# Patient Record
Sex: MALE | Race: WHITE | NOT HISPANIC OR LATINO | Employment: OTHER | URBAN - METROPOLITAN AREA
[De-identification: names, ages, dates, MRNs, and addresses within clinical notes are randomized per-mention and may not be internally consistent; named-entity substitution may affect disease eponyms.]

---

## 2017-04-24 DIAGNOSIS — G47.00 INSOMNIA, UNSPECIFIED TYPE: ICD-10-CM

## 2017-04-24 RX ORDER — CLONAZEPAM 0.5 MG/1
TABLET ORAL
Qty: 30 TAB | Refills: 3 | Status: SHIPPED
Start: 2017-04-24 | End: 2017-08-21 | Stop reason: SDUPTHER

## 2017-04-24 NOTE — TELEPHONE ENCOUNTER
Have we ever prescribed this med? Yes.  If yes, what date? 08/22/2016    Last OV: 08/19/2016 - Suzette Ohara    Next OV: 06/09/2017 - Suzette Ohara    DX: COPD    Medications: KLONOPIN

## 2017-06-01 ENCOUNTER — HOSPITAL ENCOUNTER (OUTPATIENT)
Dept: LAB | Facility: MEDICAL CENTER | Age: 72
End: 2017-06-01
Attending: INTERNAL MEDICINE
Payer: MEDICARE

## 2017-06-01 DIAGNOSIS — E78.5 DYSLIPIDEMIA: ICD-10-CM

## 2017-06-01 LAB
ALBUMIN SERPL BCP-MCNC: 4.2 G/DL (ref 3.2–4.9)
ALBUMIN/GLOB SERPL: 1.6 G/DL
ALP SERPL-CCNC: 76 U/L (ref 30–99)
ALT SERPL-CCNC: 23 U/L (ref 2–50)
ANION GAP SERPL CALC-SCNC: 9 MMOL/L (ref 0–11.9)
AST SERPL-CCNC: 19 U/L (ref 12–45)
BILIRUB SERPL-MCNC: 0.7 MG/DL (ref 0.1–1.5)
BUN SERPL-MCNC: 24 MG/DL (ref 8–22)
CALCIUM SERPL-MCNC: 9.5 MG/DL (ref 8.5–10.5)
CHLORIDE SERPL-SCNC: 106 MMOL/L (ref 96–112)
CHOLEST SERPL-MCNC: 115 MG/DL (ref 100–199)
CO2 SERPL-SCNC: 27 MMOL/L (ref 20–33)
CREAT SERPL-MCNC: 0.94 MG/DL (ref 0.5–1.4)
GFR SERPL CREATININE-BSD FRML MDRD: >60 ML/MIN/1.73 M 2
GLOBULIN SER CALC-MCNC: 2.6 G/DL (ref 1.9–3.5)
GLUCOSE SERPL-MCNC: 81 MG/DL (ref 65–99)
HDLC SERPL-MCNC: 30 MG/DL
LDLC SERPL CALC-MCNC: 52 MG/DL
POTASSIUM SERPL-SCNC: 3.2 MMOL/L (ref 3.6–5.5)
PROT SERPL-MCNC: 6.8 G/DL (ref 6–8.2)
SODIUM SERPL-SCNC: 142 MMOL/L (ref 135–145)
TRIGL SERPL-MCNC: 166 MG/DL (ref 0–149)

## 2017-06-01 PROCEDURE — 36415 COLL VENOUS BLD VENIPUNCTURE: CPT

## 2017-06-01 PROCEDURE — 80061 LIPID PANEL: CPT

## 2017-06-01 PROCEDURE — 80053 COMPREHEN METABOLIC PANEL: CPT

## 2017-06-05 ENCOUNTER — OFFICE VISIT (OUTPATIENT)
Dept: MEDICAL GROUP | Facility: MEDICAL CENTER | Age: 72
End: 2017-06-05
Payer: MEDICARE

## 2017-06-05 VITALS
TEMPERATURE: 98.7 F | OXYGEN SATURATION: 99 % | SYSTOLIC BLOOD PRESSURE: 120 MMHG | WEIGHT: 179.4 LBS | HEART RATE: 69 BPM | DIASTOLIC BLOOD PRESSURE: 72 MMHG | HEIGHT: 70 IN | RESPIRATION RATE: 16 BRPM | BODY MASS INDEX: 25.68 KG/M2

## 2017-06-05 DIAGNOSIS — K63.5 POLYP OF COLON, UNSPECIFIED PART OF COLON, UNSPECIFIED TYPE: ICD-10-CM

## 2017-06-05 DIAGNOSIS — E87.6 HYPOKALEMIA: ICD-10-CM

## 2017-06-05 DIAGNOSIS — G47.33 OSA (OBSTRUCTIVE SLEEP APNEA): ICD-10-CM

## 2017-06-05 DIAGNOSIS — I10 ESSENTIAL HYPERTENSION: ICD-10-CM

## 2017-06-05 PROCEDURE — 4040F PNEUMOC VAC/ADMIN/RCVD: CPT | Performed by: INTERNAL MEDICINE

## 2017-06-05 PROCEDURE — G8432 DEP SCR NOT DOC, RNG: HCPCS | Performed by: INTERNAL MEDICINE

## 2017-06-05 PROCEDURE — 99214 OFFICE O/P EST MOD 30 MIN: CPT | Performed by: INTERNAL MEDICINE

## 2017-06-05 PROCEDURE — G8419 CALC BMI OUT NRM PARAM NOF/U: HCPCS | Performed by: INTERNAL MEDICINE

## 2017-06-05 PROCEDURE — 1036F TOBACCO NON-USER: CPT | Performed by: INTERNAL MEDICINE

## 2017-06-05 PROCEDURE — 1101F PT FALLS ASSESS-DOCD LE1/YR: CPT | Performed by: INTERNAL MEDICINE

## 2017-06-05 RX ORDER — HYDROCHLOROTHIAZIDE 12.5 MG/1
12.5 TABLET ORAL DAILY
Qty: 90 TAB | Refills: 3 | Status: SHIPPED | OUTPATIENT
Start: 2017-06-05 | End: 2018-05-28 | Stop reason: SDUPTHER

## 2017-06-05 NOTE — PROGRESS NOTES
CC: Follow-up multiple issues    HPI:   Herber presents today with the following.    1. ALFRED (obstructive sleep apnea)  Reports compliance to CPAP usage currently dieting and exercise is down approximately 9 pounds since last visit. He is on a strict dietary program and is walking his dogs daily.    2. Essential hypertension  Blood pressures come down significantly with weight loss and exercise. Denies any chest pain or shortness breath no Lotrimin edema.     3. Hypokalemia  Potassium found to be low on blood work at 3.2. Denying any cramps no palpitations.     4. Polyp of colon, unspecified part of colon, unspecified type  History of colon polyps as well as GI bleed he is going to have repeat colonoscopy. Denies any blood in stool or dark tarry stool.      Patient Active Problem List    Diagnosis Date Noted   • ALFRED (obstructive sleep apnea) 08/19/2016   • Primary insomnia 08/19/2016   • RLS (restless legs syndrome) 06/01/2015   • History of GI bleed 05/09/2013   • Colon polyp 05/02/2011   • Dyslipidemia 05/02/2011   • BPH (benign prostatic hyperplasia) 03/29/2011   • Essential hypertension 01/12/2010       Current Outpatient Prescriptions   Medication Sig Dispense Refill   • hydrochlorothiazide (HYDRODIURIL) 12.5 MG tablet Take 1 Tab by mouth every day. 90 Tab 3   • atorvastatin (LIPITOR) 10 MG Tab TAKE ONE TABLET BY MOUTH ONCE DAILY 90 Tab 3   • clonazepam (KLONOPIN) 0.5 MG Tab TAKE ONE TABLET BY MOUTH ONCE DAILY AT BEDTIME AS NEEDED 30 Tab 3   • benazepril (LOTENSIN) 40 MG tablet TAKE ONE TABLET BY MOUTH ONCE DAILY 30 Tab 11   • amlodipine (NORVASC) 10 MG Tab Take 1 Tab by mouth every day. 30 Tab 11   • omeprazole (PRILOSEC) 40 MG delayed-release capsule Take 1 Cap by mouth every day. 30 Cap 11   • multivitamin (THERAGRAN) TABS Take 1 Tab by mouth every morning.       No current facility-administered medications for this visit.         Allergies as of 06/05/2017 - Edwardo as Reviewed 06/05/2017   Allergen Reaction  "Noted   • Pcn [penicillins]  10/16/2009        ROS: As per HPI.    /72 mmHg  Pulse 69  Temp(Src) 37.1 °C (98.7 °F)  Resp 16  Ht 1.778 m (5' 10\")  Wt 81.375 kg (179 lb 6.4 oz)  BMI 25.74 kg/m2  SpO2 99%    Physical Exam:  Gen:         Alert and oriented, No apparent distress.  Neck:        No Lymphadenopathy or Bruits.  Lungs:     Clear to auscultation bilaterally  CV:          Regular rate and rhythm. No murmurs, rubs or gallops.               Ext:          No clubbing, cyanosis, edema.      Assessment and Plan.   71 y.o. male with the following issues.    1. ALFRED (obstructive sleep apnea)  Continue current therapy follow along with pulmonology.    2. Essential hypertension  Good control per her report at home and decreasing hydrochlorothiazide to 12.5 mg.  - hydrochlorothiazide (HYDRODIURIL) 12.5 MG tablet; Take 1 Tab by mouth every day.  Dispense: 90 Tab; Refill: 3    3. Hypokalemia  Reducing medication as above continue monitor blood pressure suggested over-the-counter potassium supplement.  - BASIC METABOLIC PANEL; Future    4. Polyp of colon, unspecified part of colon, unspecified type  Referral back to gastroenterology.  - REFERRAL TO GASTROENTEROLOGY        "

## 2017-06-05 NOTE — MR AVS SNAPSHOT
"        Herber Joy   2017 9:00 AM   Office Visit   MRN: [id number]    Department:  [address] [place] [address]   Dept Phone:  [phone]    Description:  Male : 1945   Provider:  Brayden Dobbins M.D.           Reason for Visit     Hyperlipidemia     Hypertension     Results review lab results      Allergies as of 2017     Allergen Noted Reactions    Pcn [Penicillins] 10/16/2009         You were diagnosed with     ALFRED (obstructive sleep apnea)   [582547]       Essential hypertension   [1483888]       Hypokalemia   [897455]       Polyp of colon, unspecified part of colon, unspecified type   [2364891]         Vital Signs     Blood Pressure Pulse Temperature Respirations Height Weight    120/72 mmHg 69 37.1 °C (98.7 °F) 16 1.778 m (5' 10\") 81.375 kg (179 lb 6.4 oz)    Body Mass Index Oxygen Saturation Smoking Status             25.74 kg/m2 99% Former Smoker         Basic Information     Date Of Birth Sex Race Ethnicity Preferred Language    1945 Male White Non- English      Your appointments     2017 11:20 AM   Follow UP with JORDYN Tracey   [facility] (--)    [address]  [place] [id number] [phone]            2017  9:00 AM   Established Patient with Baryden Dobbins M.D.   [facility] [address]    [address]  [place] [id number] [phone]           You will be receiving a confirmation call a few days before your appointment from our automated call confirmation system.              Problem List              ICD-10-CM Priority Class Noted - Resolved    Essential hypertension I10   2010 - Present    BPH (benign prostatic hyperplasia) N40.0   3/29/2011 - Present    Colon polyp K63.5   2011 - Present    Dyslipidemia E78.5   2011 - Present    History of GI bleed Z87.19   2013 - Present    RLS (restless legs syndrome) G25.81   2015 - Present    ALFRED (obstructive " sleep apnea) G47.33   8/19/2016 - Present    Primary insomnia F51.01   8/19/2016 - Present      Health Maintenance        Date Due Completion Dates    COLONOSCOPY 5/22/2017 5/22/2015    IMM DTaP/Tdap/Td Vaccine (2 - Td) 5/9/2023 5/9/2013            Current Immunizations     13-VALENT PCV PREVNAR 6/3/2016    Influenza TIV (IM) 11/3/2014, 11/22/2011, 11/1/2010    Influenza Vaccine Adult HD 11/2/2016, 12/11/2015    Pneumococcal polysaccharide vaccine (PPSV-23) 6/7/2011    SHINGLES VACCINE 10/27/2016    Tdap Vaccine 5/9/2013      Below and/or attached are the medications your provider expects you to take. Review all of your home medications and newly ordered medications with your provider and/or pharmacist. Follow medication instructions as directed by your provider and/or pharmacist. Please keep your medication list with you and share with your provider. Update the information when medications are discontinued, doses are changed, or new medications (including over-the-counter products) are added; and carry medication information at all times in the event of emergency situations     Allergies:  PCN - (reactions not documented)               Medications  Valid as of: June 05, 2017 -  9:01 AM    Generic Name Brand Name Tablet Size Instructions for use    AmLODIPine Besylate (Tab) NORVASC 10 MG Take 1 Tab by mouth every day.        Atorvastatin Calcium (Tab) LIPITOR 10 MG TAKE ONE TABLET BY MOUTH ONCE DAILY        Benazepril HCl (Tab) LOTENSIN 40 MG TAKE ONE TABLET BY MOUTH ONCE DAILY        ClonazePAM (Tab) KLONOPIN 0.5 MG TAKE ONE TABLET BY MOUTH ONCE DAILY AT BEDTIME AS NEEDED        HydroCHLOROthiazide (Tab) HYDRODIURIL 12.5 MG Take 1 Tab by mouth every day.        Multiple Vitamin (Tab) THERAGRAN  Take 1 Tab by mouth every morning.        Omeprazole (CAPSULE DELAYED RELEASE) PRILOSEC 40 MG Take 1 Cap by mouth every day.        .                 Medicines prescribed today were sent to:     [id number] -  [place], [place] - [address] [place] [address]    Phone: [phone] Fax: [phone]    Open 24 Hours?: No      Medication refill instructions:       If your prescription bottle indicates you have medication refills left, it is not necessary to call your provider’s office. Please contact your pharmacy and they will refill your medication.    If your prescription bottle indicates you do not have any refills left, you may request refills at any time through one of the following ways: The online [employer] system (except Urgent Care), by calling your provider’s office, or by asking your pharmacy to contact your provider’s office with a refill request. Medication refills are processed only during regular business hours and may not be available until the next business day. Your provider may request additional information or to have a follow-up visit with you prior to refilling your medication.   *Please Note: Medication refills are assigned a new Rx number when refilled electronically. Your pharmacy may indicate that no refills were authorized even though a new prescription for the same medication is available at the pharmacy. Please request the medicine by name with the pharmacy before contacting your provider for a refill.        Your To Do List     Future Labs/Procedures Complete By Expires    BASIC METABOLIC PANEL  As directed 6/6/2018      Referral     A referral request has been sent to our patient care coordination department. Please allow 3-5 business days for us to process this request and contact you either by phone or mail. If you do not hear from us by the 5th business day, please call us at [phone].           [employer] Access Code: Activation code not generated  Current [employer] Status: Active

## 2017-06-08 ENCOUNTER — SLEEP CENTER VISIT (OUTPATIENT)
Dept: SLEEP MEDICINE | Facility: MEDICAL CENTER | Age: 72
End: 2017-06-08
Payer: MEDICARE

## 2017-06-08 VITALS
HEART RATE: 71 BPM | SYSTOLIC BLOOD PRESSURE: 122 MMHG | RESPIRATION RATE: 14 BRPM | HEIGHT: 70 IN | OXYGEN SATURATION: 97 % | WEIGHT: 172 LBS | BODY MASS INDEX: 24.62 KG/M2 | DIASTOLIC BLOOD PRESSURE: 78 MMHG

## 2017-06-08 DIAGNOSIS — F51.01 PRIMARY INSOMNIA: ICD-10-CM

## 2017-06-08 DIAGNOSIS — G47.33 OSA (OBSTRUCTIVE SLEEP APNEA): ICD-10-CM

## 2017-06-08 DIAGNOSIS — G25.81 RLS (RESTLESS LEGS SYNDROME): ICD-10-CM

## 2017-06-08 PROCEDURE — 99213 OFFICE O/P EST LOW 20 MIN: CPT | Performed by: NURSE PRACTITIONER

## 2017-06-08 NOTE — MR AVS SNAPSHOT
"Herber Hamilton   2017 4:00 PM   Sleep Center Visit   MRN: [id number]    Department:  [facility]   Dept Phone:  [phone]    Description:  Male : 1945   Provider:  JORDYN Tracey           Reason for Visit     Follow-Up 1 Year       Allergies as of 2017     Allergen Noted Reactions    Pcn [Penicillins] 10/16/2009         You were diagnosed with     ALFRED (obstructive sleep apnea)   [844143]       Primary insomnia   [524925]       RLS (restless legs syndrome)   [307903]         Vital Signs     Blood Pressure Pulse Respirations Height Weight Body Mass Index    122/78 mmHg 71 14 1.778 m (5' 10\") 78.019 kg (172 lb) 24.68 kg/m2    Oxygen Saturation Smoking Status                97% Former Smoker          Basic Information     Date Of Birth Sex Race Ethnicity Preferred Language    1945 Male White Non- English      Your appointments     2017  9:00 AM   Established Patient with Brayden Dobbins M.D.   [facility] [address]    [address]  [address]  [facility] [address]   [phone]           You will be receiving a confirmation call a few days before your appointment from our automated call confirmation system.              Problem List              ICD-10-CM Priority Class Noted - Resolved    Essential hypertension I10   2010 - Present    BPH (benign prostatic hyperplasia) N40.0   3/29/2011 - Present    Colon polyp K63.5   2011 - Present    Dyslipidemia E78.5   2011 - Present    History of GI bleed Z87.19   2013 - Present    RLS (restless legs syndrome) G25.81   2015 - Present    ALFRED (obstructive sleep apnea) G47.33   2016 - Present    Primary insomnia F51.01   2016 - Present      Health Maintenance        Date Due Completion Dates    COLONOSCOPY 2017    IMM DTaP/Tdap/Td Vaccine (2 - Td) 2023            Current Immunizations     13-VALENT PCV PREVNAR 6/3/2016    Influenza TIV " (IM) 11/3/2014, 11/22/2011, 11/1/2010    Influenza Vaccine Adult HD 11/2/2016, 12/11/2015    Pneumococcal polysaccharide vaccine (PPSV-23) 6/7/2011    SHINGLES VACCINE 10/27/2016    Tdap Vaccine 5/9/2013      Below and/or attached are the medications your provider expects you to take. Review all of your home medications and newly ordered medications with your provider and/or pharmacist. Follow medication instructions as directed by your provider and/or pharmacist. Please keep your medication list with you and share with your provider. Update the information when medications are discontinued, doses are changed, or new medications (including over-the-counter products) are added; and carry medication information at all times in the event of emergency situations     Allergies:  PCN - (reactions not documented)               Medications  Valid as of: June 08, 2017 -  4:13 PM    Generic Name Brand Name Tablet Size Instructions for use    AmLODIPine Besylate (Tab) NORVASC 10 MG Take 1 Tab by mouth every day.        Atorvastatin Calcium (Tab) LIPITOR 10 MG TAKE ONE TABLET BY MOUTH ONCE DAILY        Benazepril HCl (Tab) LOTENSIN 40 MG TAKE ONE TABLET BY MOUTH ONCE DAILY        ClonazePAM (Tab) KLONOPIN 0.5 MG TAKE ONE TABLET BY MOUTH ONCE DAILY AT BEDTIME AS NEEDED        HydroCHLOROthiazide (Tab) HYDRODIURIL 12.5 MG Take 1 Tab by mouth every day.        Multiple Vitamin (Tab) THERAGRAN  Take 1 Tab by mouth every morning.        Omeprazole (CAPSULE DELAYED RELEASE) PRILOSEC 40 MG Take 1 Cap by mouth every day.        .                 Medicines prescribed today were sent to:     [facility] [address] [place] [address] - [address] [place] [address] [place] [address]    Phone: [phone] Fax: [phone]    Open 24 Hours?: No      Medication refill instructions:       If your prescription bottle indicates you have medication refills left, it is not necessary to call your provider’s office. Please contact your  pharmacy and they will refill your medication.    If your prescription bottle indicates you do not have any refills left, you may request refills at any time through one of the following ways: The online [employer] system (except Urgent Care), by calling your provider’s office, or by asking your pharmacy to contact your provider’s office with a refill request. Medication refills are processed only during regular business hours and may not be available until the next business day. Your provider may request additional information or to have a follow-up visit with you prior to refilling your medication.   *Please Note: Medication refills are assigned a new Rx number when refilled electronically. Your pharmacy may indicate that no refills were authorized even though a new prescription for the same medication is available at the pharmacy. Please request the medicine by name with the pharmacy before contacting your provider for a refill.           [employer] Access Code: Activation code not generated  Current [employer] Status: Active

## 2017-06-08 NOTE — PATIENT INSTRUCTIONS
Plan:    1) Continue Auto CPAP 5-10 CM H20. [facility] new mask and supplies.   2) Sleep hygiene discussed. Continue Clonazepam 0.5 mg 1 po qhs.   3) Follow up on an annual basis, sooner if needed.

## 2017-06-08 NOTE — PROGRESS NOTES
Chief Complaint   Patient presents with   • Follow-Up     1 Year        HPI:  Herber Hamilton is a 71 y.o. year old male here today for follow-up on his obstructive sleep apnea, insomnia and RLS. He was last see in out office in July 2015. He has a history of mild sleep apnea with an AHI of 12. He has been compliant on an Auto CPAP 5-10 CM H20. His compliance card download indicates an AHI of 4.4 with an average use of over 7 hours at night. His peak average pressure is 6.7. He is on low dose Clonazepam 0.5 mg at night which he feels has helped with his insomnia and RLS symptoms. He tolerates the pressure well. He has a full face mask which is a good fit. Overall he feels he sleeps better on therapy and wakes more refreshed. He denies any morning headaches.       Past Medical History   Diagnosis Date   • UTI (urinary tract infection)      NOW AND 2 MONTHS AGO POST OP   • Hypertension    • HTN (hypertension) 1/12/2010   • Depression 1/12/2010   • Dyslipidemia    • Sleep apnea        Past Surgical History   Procedure Laterality Date   • Other       PROSTATE SURGERY   • Trans urethral resection prostate  3/19/2009     Performed by SAI AKERS at SURGERY [facility] ORS   • Gastroscopy-endo  3/13/2012     Performed by KARISHMA NEWMAN at ENDOSCOPY [facility] ORS   • Gastroscopy-endo  3/14/2012     Performed by MELANIA OROZCO at ENDOSCOPY [facility] ORS   • Tonsillectomy         Family History   Problem Relation Age of Onset   • Arthritis Neg Hx    • Lung Disease Neg Hx    • Genetic Neg Hx    • Cancer Neg Hx    • Psychiatry Neg Hx    • Diabetes Neg Hx    • Hypertension Neg Hx    • Hyperlipidemia Neg Hx    • Stroke Neg Hx    • Alcohol/Drug Neg Hx    • Non-contributory Mother      unknown   • Heart Disease Father        Social History     Social History   • Marital Status:      Spouse Name: N/A   • Number of Children: N/A   • Years of Education: N/A     Occupational History   • Not on file.     Social  History Main Topics   • Smoking status: Former Smoker     Quit date: 01/01/1986   • Smokeless tobacco: Never Used   • Alcohol Use: 0.0 oz/week     0 Standard drinks or equivalent per week      Comment: rarely   • Drug Use: No   • Sexual Activity: Not on file     Other Topics Concern   • Not on file     Social History Narrative         ROS:  Constitutional: Denies fevers, chills, sweats, fatigue, weight loss  Eyes: Denies glasses, vision loss, pain, drainage, double vision  Ears/Nose/Mouth/Throat: Denies rhinitis, nasal congestion, ear ache, difficulty hearing, sore throat, persistent hoarseness, decayed teeth/toothache  Cardiovascular: Denies chest pain, tightness, palpitations, swelling in feet/legs, fainting, difficulty breathing when laying down  Respiratory: Denies shortness of breath, cough, sputum, wheezing, painful breathing, coughing up blood  GI: Denies heartburn, difficulty swallowing, nausea, vomiting, abdominal pain, diarrhea, constipation  : Denies frequent urination, painful urination  Integumentary: Denies rashes, lumps or color changes  MSK: Denies painful joints, sore muscles, and back pain.   Neurological: Denies frequent headaches, dizziness, weakness  Sleep: See HPI       Current Outpatient Prescriptions on File Prior to Visit   Medication Sig Dispense Refill   • hydrochlorothiazide (HYDRODIURIL) 12.5 MG tablet Take 1 Tab by mouth every day. 90 Tab 3   • atorvastatin (LIPITOR) 10 MG Tab TAKE ONE TABLET BY MOUTH ONCE DAILY 90 Tab 3   • clonazepam (KLONOPIN) 0.5 MG Tab TAKE ONE TABLET BY MOUTH ONCE DAILY AT BEDTIME AS NEEDED 30 Tab 3   • benazepril (LOTENSIN) 40 MG tablet TAKE ONE TABLET BY MOUTH ONCE DAILY 30 Tab 11   • amlodipine (NORVASC) 10 MG Tab Take 1 Tab by mouth every day. 30 Tab 11   • omeprazole (PRILOSEC) 40 MG delayed-release capsule Take 1 Cap by mouth every day. 30 Cap 11   • multivitamin (THERAGRAN) TABS Take 1 Tab by mouth every morning.       No current facility-administered  "medications on file prior to visit.     Pcn    Blood pressure 122/78, pulse 71, resp. rate 14, height 1.778 m (5' 10\"), weight 78.019 kg (172 lb), SpO2 97 %.  PE:   Appearance: Well developed, well nourished, no acute distress  Eyes: PERRL, EOM intact, sclera white, conjunctiva moist  Ears: no lesions or deformities  Hearing: grossly intact  Nose: no lesions or deformities  Oropharynx: tongue normal, posterior pharynx without erythema or exudate  Mallampati Classification: class 4  Neck: supple, trachea midline, no masses   Respiratory effort: no intercostal retractions or use of accessory muscles  Lung auscultation: no rales, rhonchi or wheezes  Heart auscultation: no murmur rub or gallop  Extremities: no cyanosis or edema  Abdomen: soft ,non tender, no masses  Gait and Station: normal  Digits and nails: no clubbing, cyanosis, petechiae or nodes.  Cranial nerves: grossly intact  Skin: no rashes, lesions or ulcers noted  Orientation: Oriented to time, person and place  Mood and affect: mood and affect appropriate, normal interaction with examiner  Judgement: Intact          Assessment:  1. ALFRED (obstructive sleep apnea)     2. Primary insomnia     3. RLS (restless legs syndrome)           Plan:    1) Continue Auto CPAP 5-10 CM H20. [facility] new mask and supplies.   2) Sleep hygiene discussed. Continue Clonazepam 0.5 mg 1 po qhs.   3) Follow up on an annual basis, sooner if needed.   "

## 2017-06-09 ENCOUNTER — APPOINTMENT (OUTPATIENT)
Dept: SLEEP MEDICINE | Facility: MEDICAL CENTER | Age: 72
End: 2017-06-09
Payer: MEDICARE

## 2017-06-15 ENCOUNTER — APPOINTMENT (OUTPATIENT)
Dept: RADIOLOGY | Facility: IMAGING CENTER | Age: 72
End: 2017-06-15
Attending: PHYSICIAN ASSISTANT
Payer: MEDICARE

## 2017-06-15 ENCOUNTER — OFFICE VISIT (OUTPATIENT)
Dept: URGENT CARE | Facility: PHYSICIAN GROUP | Age: 72
End: 2017-06-15
Payer: MEDICARE

## 2017-06-15 VITALS
HEIGHT: 70 IN | TEMPERATURE: 97.3 F | HEART RATE: 48 BPM | RESPIRATION RATE: 14 BRPM | BODY MASS INDEX: 25.2 KG/M2 | SYSTOLIC BLOOD PRESSURE: 100 MMHG | DIASTOLIC BLOOD PRESSURE: 60 MMHG | WEIGHT: 176 LBS | OXYGEN SATURATION: 94 %

## 2017-06-15 DIAGNOSIS — R55 NEAR SYNCOPE: ICD-10-CM

## 2017-06-15 DIAGNOSIS — S61.218A LACERATION OF INDEX FINGER, INITIAL ENCOUNTER: ICD-10-CM

## 2017-06-15 PROCEDURE — 12041 INTMD RPR N-HF/GENIT 2.5CM/<: CPT | Performed by: PHYSICIAN ASSISTANT

## 2017-06-15 PROCEDURE — 73140 X-RAY EXAM OF FINGER(S): CPT | Mod: 26,RT | Performed by: PHYSICIAN ASSISTANT

## 2017-06-15 RX ORDER — CEPHALEXIN 500 MG/1
500 CAPSULE ORAL 3 TIMES DAILY
Qty: 30 CAP | Refills: 0 | Status: SHIPPED | OUTPATIENT
Start: 2017-06-15 | End: 2017-11-06

## 2017-06-15 ASSESSMENT — ENCOUNTER SYMPTOMS
VOMITING: 0
PALPITATIONS: 0
DIZZINESS: 1
FALLS: 0
FEVER: 0
ABDOMINAL PAIN: 0
FOCAL WEAKNESS: 0
WEAKNESS: 1
NAUSEA: 0
HEADACHES: 0
CHILLS: 0
SENSORY CHANGE: 0
COUGH: 0
MYALGIAS: 0

## 2017-06-15 NOTE — MR AVS SNAPSHOT
"Herber Hamilton   6/15/2017 6:45 PM   Office Visit   MRN: [id number]    Department:  [facility]   Dept Phone:  [phone]    Description:  Male : 1945   Provider:  Nohemi Humphries PA-C           Reason for Visit     Laceration right index finger laceration      Allergies as of 6/15/2017     Allergen Noted Reactions    Pcn [Penicillins] 10/16/2009         You were diagnosed with     Laceration of index finger, initial encounter   [[id number]]         Vital Signs     Blood Pressure Pulse Temperature Respirations Height Weight    100/60 mmHg 48 36.3 °C (97.3 °F) 14 1.778 m (5' 10\") 79.833 kg (176 lb)    Body Mass Index Oxygen Saturation Smoking Status             25.25 kg/m2 94% Former Smoker         Basic Information     Date Of Birth Sex Race Ethnicity Preferred Language    1945 Male White Non- English      Your appointments     2017  9:40 AM   Established Patient with Brayden Dobbins M.D.   [facility] [address]    [address] [facility] [address]  [place] [id number] [phone]           You will be receiving a confirmation call a few days before your appointment from our automated call confirmation system.            2017  9:00 AM   Established Patient with rBayden Dobbins M.D.   [facility] [address]    [address]  [address]  [place] [address] [phone]           You will be receiving a confirmation call a few days before your appointment from our automated call confirmation system.              Problem List              ICD-10-CM Priority Class Noted - Resolved    Essential hypertension I10   2010 - Present    BPH (benign prostatic hyperplasia) N40.0   3/29/2011 - Present    Colon polyp K63.5   2011 - Present    Dyslipidemia E78.5   2011 - Present    History of GI bleed Z87.19   2013 - Present    RLS (restless legs syndrome) G25.81   2015 - Present    ALFRED (obstructive sleep apnea) G47.33   " 8/19/2016 - Present    Primary insomnia F51.01   8/19/2016 - Present      Health Maintenance        Date Due Completion Dates    COLONOSCOPY 5/22/2017 5/22/2015    IMM DTaP/Tdap/Td Vaccine (2 - Td) 5/9/2023 5/9/2013            Current Immunizations     13-VALENT PCV PREVNAR 6/3/2016    Influenza TIV (IM) 11/3/2014, 11/22/2011, 11/1/2010    Influenza Vaccine Adult HD 11/2/2016, 12/11/2015    Pneumococcal polysaccharide vaccine (PPSV-23) 6/7/2011    SHINGLES VACCINE 10/27/2016    Tdap Vaccine 5/9/2013      Below and/or attached are the medications your provider expects you to take. Review all of your home medications and newly ordered medications with your provider and/or pharmacist. Follow medication instructions as directed by your provider and/or pharmacist. Please keep your medication list with you and share with your provider. Update the information when medications are discontinued, doses are changed, or new medications (including over-the-counter products) are added; and carry medication information at all times in the event of emergency situations     Allergies:  PCN - (reactions not documented)               Medications  Valid as of: Ashwini 15, 2017 -  7:32 PM    Generic Name Brand Name Tablet Size Instructions for use    AmLODIPine Besylate (Tab) NORVASC 10 MG Take 1 Tab by mouth every day.        Atorvastatin Calcium (Tab) LIPITOR 10 MG TAKE ONE TABLET BY MOUTH ONCE DAILY        Benazepril HCl (Tab) LOTENSIN 40 MG TAKE ONE TABLET BY MOUTH ONCE DAILY        Cephalexin (Cap) KEFLEX 500 MG Take 1 Cap by mouth 3 times a day.        ClonazePAM (Tab) KLONOPIN 0.5 MG TAKE ONE TABLET BY MOUTH ONCE DAILY AT BEDTIME AS NEEDED        HydroCHLOROthiazide (Tab) HYDRODIURIL 12.5 MG Take 1 Tab by mouth every day.        Multiple Vitamin (Tab) THERAGRAN  Take 1 Tab by mouth every morning.        Omeprazole (CAPSULE DELAYED RELEASE) PRILOSEC 40 MG Take 1 Cap by mouth every day.        .                 Medicines prescribed  today were sent to:     [facility], [place] - [address] [place] [address]    Phone: [phone] Fax: [phone]    Open 24 Hours?: No      Medication refill instructions:       If your prescription bottle indicates you have medication refills left, it is not necessary to call your provider’s office. Please contact your pharmacy and they will refill your medication.    If your prescription bottle indicates you do not have any refills left, you may request refills at any time through one of the following ways: The online [employer] system (except Urgent Care), by calling your provider’s office, or by asking your pharmacy to contact your provider’s office with a refill request. Medication refills are processed only during regular business hours and may not be available until the next business day. Your provider may request additional information or to have a follow-up visit with you prior to refilling your medication.   *Please Note: Medication refills are assigned a new Rx number when refilled electronically. Your pharmacy may indicate that no refills were authorized even though a new prescription for the same medication is available at the pharmacy. Please request the medicine by name with the pharmacy before contacting your provider for a refill.        Your To Do List     Future Labs/Procedures Complete By Expires    DX-FINGER(S) 2+ RIGHT  As directed 6/15/2018         [employer] Access Code: Activation code not generated  Current [employer] Status: Active

## 2017-06-16 ENCOUNTER — TELEPHONE (OUTPATIENT)
Dept: MEDICAL GROUP | Facility: MEDICAL CENTER | Age: 72
End: 2017-06-16

## 2017-06-16 NOTE — TELEPHONE ENCOUNTER
Future Appointments       Provider Department Center    6/19/2017 9:40 AM Brayden Dobbins M.D. [facility] [address]    11/6/2017 9:00 AM Brayden Dobbins M.D. [facility] [address]        ESTABLISHED PATIENT PRE-VISIT PLANNING     Note: Patient will not be contacted if there is no indication to call.     1.  Reviewed note from last office visit with PCP and/or other med group provider: Yes    2.  If any orders were placed at last visit, do we have Results/Consult Notes?        •  Labs - Labs were not ordered at last office visit.       •  Imaging - Imaging was not ordered at last office visit.       •  Referrals - No referrals were ordered at last office visit.    3.  Immunizations were updated in Epic using [employer]?: Yes       •  [employer] Recommendations: HEPATITIS A  TD    4.  Patient is due for the following Health Maintenance Topics:   Health Maintenance Due   Topic Date Due   • COLONOSCOPY  05/22/2017           5.  Patient was not informed to arrive 15 min prior to their scheduled appointment and bring in their medication bottles.

## 2017-06-16 NOTE — PROGRESS NOTES
"Subjective:      Herber Hamilton is a 71 y.o. male who presents with Laceration    Current medications reviewed.  Past Medical History   Diagnosis Date   • UTI (urinary tract infection)      NOW AND 2 MONTHS AGO POST OP   • Hypertension    • HTN (hypertension) 1/12/2010   • Depression 1/12/2010   • Dyslipidemia    • Sleep apnea      Social History   Substance Use Topics   • Smoking status: Former Smoker     Quit date: 01/01/1986   • Smokeless tobacco: Never Used   • Alcohol Use: 0.0 oz/week     0 Standard drinks or equivalent per week      Comment: rarely     Family History Reviewed: noncontributory      70 yo male using table saw with gloves, right index finger hit blade with laceration to distal tip, patient is feeling faint and weak.  He has full sensation and ROM.  He was placed on 2L O2 and pulse ox monitored.    Laceration         Review of Systems   Constitutional: Negative for fever and chills.   Respiratory: Negative for cough.    Cardiovascular: Negative for chest pain and palpitations.   Gastrointestinal: Negative for nausea, vomiting and abdominal pain.   Musculoskeletal: Positive for joint pain. Negative for myalgias and falls.   Neurological: Positive for dizziness and weakness. Negative for sensory change, focal weakness and headaches.   All other systems reviewed and are negative.         Objective:     /60 mmHg  Pulse 48  Temp(Src) 36.3 °C (97.3 °F)  Resp 14  Ht 1.778 m (5' 10\")  Wt 79.833 kg (176 lb)  BMI 25.25 kg/m2  SpO2 94%     Physical Exam   Constitutional: He is oriented to person, place, and time. He appears well-developed and well-nourished.   Eyes: EOM are normal. Pupils are equal, round, and reactive to light.   Cardiovascular: Normal rate and normal heart sounds.    Pulmonary/Chest: Effort normal and breath sounds normal.   Musculoskeletal:        Right hand: He exhibits tenderness, bony tenderness, deformity, laceration and swelling. He exhibits normal range of motion, " normal two-point discrimination and normal capillary refill. Normal sensation noted. Normal strength noted.        Hands:  Distal lateral tip of index finger caught in table saw with anterior subcut tissue missing but external dermis mostly in place.  Nothing to hold sutures because of nature of injury.  Bone visualized but no tendons, full ROM and 5/5    Neurological: He is alert and oriented to person, place, and time.   Skin: Skin is warm and dry.   Nursing note and vitals reviewed.    HR went into low 40s with near syncope, recovered well with pulse of 55 at discharge with good color and energy.          Assessment/Plan:     1. Laceration of index finger, initial encounter  DX-FINGER(S) 2+ RIGHT    cephALEXin (KEFLEX) 500 MG Cap   2. Near syncope       Finger xray: Interpreted by me as negative for acute fx or dislocation.   Last tetanus: 2013  RX keflex 2/2 dirty wound, wound flushed well with sterile saline and soaked in hibicleans  Procedure: Laceration Repair  -Risks including bleeding, nerve damage, infection, and poor cosmetic outcome discussed at length. Benefits and alternatives discussed.   -Wound flushed, debrided and disinfected  -Sterile technique throughout  -No Local anesthesia   -Closed with derma bond and steri strips with good wound approximation  -Dressing placed, wound care instructions given  -Patient tolerated well  AFTERCARE -- No external bandages are necessary over a wound closed by tissue adhesives. The adhesive itself acts as a water-resistant bandage. Antibiotic ointment should not be used as it can break down the adhesive prematurely.  Patients may shower while the adhesive is on the skin, but should not soak or scrub the area for 7 to 10 days. Wet skin should be gently patted dry. Children should not take baths.    The adhesive will peel off when the epithelial layer sloughs off, usually by 5 to 10 days. Antibiotic ointment or petroleum jelly can be applied to the wound if the  adhesive does not come off on its own.    No follow-up visit is required unless there are signs of infection or nonhealing.  Asked them to follow up with pcp in 4 days.

## 2017-06-19 ENCOUNTER — OFFICE VISIT (OUTPATIENT)
Dept: MEDICAL GROUP | Facility: MEDICAL CENTER | Age: 72
End: 2017-06-19
Payer: MEDICARE

## 2017-06-19 VITALS
HEART RATE: 69 BPM | SYSTOLIC BLOOD PRESSURE: 116 MMHG | WEIGHT: 181.6 LBS | OXYGEN SATURATION: 99 % | RESPIRATION RATE: 16 BRPM | DIASTOLIC BLOOD PRESSURE: 74 MMHG | HEIGHT: 70 IN | BODY MASS INDEX: 26 KG/M2 | TEMPERATURE: 98.1 F

## 2017-06-19 DIAGNOSIS — S61.219D FINGER LACERATION, SUBSEQUENT ENCOUNTER: ICD-10-CM

## 2017-06-19 PROCEDURE — 99213 OFFICE O/P EST LOW 20 MIN: CPT | Performed by: INTERNAL MEDICINE

## 2017-06-19 RX ORDER — HYDROCODONE BITARTRATE AND ACETAMINOPHEN 5; 325 MG/1; MG/1
1-2 TABLET ORAL EVERY 4 HOURS PRN
COMMUNITY
End: 2018-11-07

## 2017-06-19 NOTE — PROGRESS NOTES
"CC: Finger laceration    HPI:   Herber presents today with the following.    1. Finger laceration, subsequent encounter  Presents after an injury with power saw. Presented to urgent care proxy 4 days ago. No sutures placed left to heal by secondary intent. He denies any fevers chills or spreading redness he is compliant with antibiotics.      Patient Active Problem List    Diagnosis Date Noted   • ALFRED (obstructive sleep apnea) 08/19/2016   • Primary insomnia 08/19/2016   • RLS (restless legs syndrome) 06/01/2015   • History of GI bleed 05/09/2013   • Colon polyp 05/02/2011   • Dyslipidemia 05/02/2011   • BPH (benign prostatic hyperplasia) 03/29/2011   • Essential hypertension 01/12/2010       Current Outpatient Prescriptions   Medication Sig Dispense Refill   • hydrocodone-acetaminophen (NORCO) 5-325 MG Tab per tablet Take 1-2 Tabs by mouth every four hours as needed.     • cephALEXin (KEFLEX) 500 MG Cap Take 1 Cap by mouth 3 times a day. 30 Cap 0   • hydrochlorothiazide (HYDRODIURIL) 12.5 MG tablet Take 1 Tab by mouth every day. 90 Tab 3   • atorvastatin (LIPITOR) 10 MG Tab TAKE ONE TABLET BY MOUTH ONCE DAILY 90 Tab 3   • clonazepam (KLONOPIN) 0.5 MG Tab TAKE ONE TABLET BY MOUTH ONCE DAILY AT BEDTIME AS NEEDED 30 Tab 3   • benazepril (LOTENSIN) 40 MG tablet TAKE ONE TABLET BY MOUTH ONCE DAILY 30 Tab 11   • amlodipine (NORVASC) 10 MG Tab Take 1 Tab by mouth every day. 30 Tab 11   • omeprazole (PRILOSEC) 40 MG delayed-release capsule Take 1 Cap by mouth every day. 30 Cap 11   • multivitamin (THERAGRAN) TABS Take 1 Tab by mouth every morning.       No current facility-administered medications for this visit.         Allergies as of 06/19/2017 - Edwardo as Reviewed 06/19/2017   Allergen Reaction Noted   • Pcn [penicillins]  10/16/2009        ROS: As per HPI.    /74 mmHg  Pulse 69  Temp(Src) 36.7 °C (98.1 °F)  Resp 16  Ht 1.778 m (5' 10\")  Wt 82.373 kg (181 lb 9.6 oz)  BMI 26.06 kg/m2  SpO2 99%    Physical " Exam:  Gen:         Alert and oriented, No apparent distress.  Neck:        No Lymphadenopathy or Bruits.  Lungs:     Clear to auscultation bilaterally  CV:          Regular rate and rhythm. No murmurs, rubs or gallops.               Ext:          No clubbing, cyanosis, edema.      Assessment and Plan.   71 y.o. male with the following issues.    1. Finger laceration, subsequent encounter  Distal tip does have some whiteness around the edges questioning whether the flap will take. No active bleeding redness or drainage. Have placed referral to wound care for surveillance to see if it needs to be debridement in the future.  - REFERRAL TO WOUND CLINIC

## 2017-06-19 NOTE — MR AVS SNAPSHOT
"        Herber Joy   2017 9:40 AM   Office Visit   MRN: [id number]    Department:  [address] [place] [address]   Dept Phone:  [phone]    Description:  Male : 1945   Provider:  Brayden Dobbins M.D.           Reason for Visit     Finger Injury UC follow up      Allergies as of 2017     Allergen Noted Reactions    Pcn [Penicillins] 10/16/2009         You were diagnosed with     Finger laceration, subsequent encounter   [[id number]]         Vital Signs     Blood Pressure Pulse Temperature Respirations Height Weight    116/74 mmHg 69 36.7 °C (98.1 °F) 16 1.778 m (5' 10\") 82.373 kg (181 lb 9.6 oz)    Body Mass Index Oxygen Saturation Smoking Status             26.06 kg/m2 99% Former Smoker         Basic Information     Date Of Birth Sex Race Ethnicity Preferred Language    1945 Male White Non- English      Your appointments     2017  9:00 AM   Established Patient with Brayden Dobbins M.D.   [facility] [address]    [address] [facility] [address]  [facility] [address]   [phone]           You will be receiving a confirmation call a few days before your appointment from our automated call confirmation system.              Problem List              ICD-10-CM Priority Class Noted - Resolved    Essential hypertension I10   2010 - Present    BPH (benign prostatic hyperplasia) N40.0   3/29/2011 - Present    Colon polyp K63.5   2011 - Present    Dyslipidemia E78.5   2011 - Present    History of GI bleed Z87.19   2013 - Present    RLS (restless legs syndrome) G25.81   2015 - Present    ALFRED (obstructive sleep apnea) G47.33   2016 - Present    Primary insomnia F51.01   2016 - Present      Health Maintenance        Date Due Completion Dates    COLONOSCOPY 2020    IMM DTaP/Tdap/Td Vaccine (2 - Td) 2023            Current Immunizations     13-VALENT PCV PREVNAR 6/3/2016    Influenza TIV (IM) 11/3/2014, 2011, 2010   "    Influenza Vaccine Adult HD 11/2/2016, 12/11/2015    Pneumococcal polysaccharide vaccine (PPSV-23) 6/7/2011    SHINGLES VACCINE 10/27/2016    Tdap Vaccine 5/9/2013      Below and/or attached are the medications your provider expects you to take. Review all of your home medications and newly ordered medications with your provider and/or pharmacist. Follow medication instructions as directed by your provider and/or pharmacist. Please keep your medication list with you and share with your provider. Update the information when medications are discontinued, doses are changed, or new medications (including over-the-counter products) are added; and carry medication information at all times in the event of emergency situations     Allergies:  PCN - (reactions not documented)               Medications  Valid as of: June 19, 2017 - 10:04 AM    Generic Name Brand Name Tablet Size Instructions for use    AmLODIPine Besylate (Tab) NORVASC 10 MG Take 1 Tab by mouth every day.        Atorvastatin Calcium (Tab) LIPITOR 10 MG TAKE ONE TABLET BY MOUTH ONCE DAILY        Benazepril HCl (Tab) LOTENSIN 40 MG TAKE ONE TABLET BY MOUTH ONCE DAILY        Cephalexin (Cap) KEFLEX 500 MG Take 1 Cap by mouth 3 times a day.        ClonazePAM (Tab) KLONOPIN 0.5 MG TAKE ONE TABLET BY MOUTH ONCE DAILY AT BEDTIME AS NEEDED        HydroCHLOROthiazide (Tab) HYDRODIURIL 12.5 MG Take 1 Tab by mouth every day.        Hydrocodone-Acetaminophen (Tab) NORCO 5-325 MG Take 1-2 Tabs by mouth every four hours as needed.        Multiple Vitamin (Tab) THERAGRAN  Take 1 Tab by mouth every morning.        Omeprazole (CAPSULE DELAYED RELEASE) PRILOSEC 40 MG Take 1 Cap by mouth every day.        .                 Medicines prescribed today were sent to:     [facility] [address] [place] [address] - [address] [address]    Phone: [phone] Fax: [phone]    Open 24 Hours?: No      Medication refill instructions:       If  your prescription bottle indicates you have medication refills left, it is not necessary to call your provider’s office. Please contact your pharmacy and they will refill your medication.    If your prescription bottle indicates you do not have any refills left, you may request refills at any time through one of the following ways: The online [employer] system (except Urgent Care), by calling your provider’s office, or by asking your pharmacy to contact your provider’s office with a refill request. Medication refills are processed only during regular business hours and may not be available until the next business day. Your provider may request additional information or to have a follow-up visit with you prior to refilling your medication.   *Please Note: Medication refills are assigned a new Rx number when refilled electronically. Your pharmacy may indicate that no refills were authorized even though a new prescription for the same medication is available at the pharmacy. Please request the medicine by name with the pharmacy before contacting your provider for a refill.        Referral     A referral request has been sent to our patient care coordination department. Please allow 3-5 business days for us to process this request and contact you either by phone or mail. If you do not hear from us by the 5th business day, please call us at [phone].           [employer] Access Code: Activation code not generated  Current [employer] Status: Active

## 2017-06-30 ENCOUNTER — NON-PROVIDER VISIT (OUTPATIENT)
Dept: WOUND CARE | Facility: MEDICAL CENTER | Age: 72
End: 2017-06-30
Attending: INTERNAL MEDICINE
Payer: MEDICARE

## 2017-06-30 PROCEDURE — A6402 STERILE GAUZE <= 16 SQ IN: HCPCS

## 2017-06-30 PROCEDURE — A6212 FOAM DRG <=16 SQ IN W/BORDER: HCPCS

## 2017-06-30 PROCEDURE — 97161 PT EVAL LOW COMPLEX 20 MIN: CPT

## 2017-06-30 PROCEDURE — A6237 HYDROCOLLD DRG <=16 IN W/BDR: HCPCS

## 2017-06-30 PROCEDURE — 97597 DBRDMT OPN WND 1ST 20 CM/<: CPT

## 2017-06-30 PROCEDURE — A6457 TUBULAR DRESSING: HCPCS

## 2017-06-30 NOTE — CERTIFICATION
"Advanced Wound Care  [facility]  [address]  [address]  [place] [address]  [phone] Fax: [phone]      Initial Evaluation  For Certification Period:6/30/17-7/30/17            Referring Physician: Brayden Dobbins MD  Primary Physician:     same  Consulting Physicians:         Wound(s):R hand distal digit 2  Start of Care:6/30/17        Subjective:        HPI:        71 year old male referred by PCP for treatment wound R hand distal digit 1. Onset 6/15/17, laceration from power saw. Pt seen in .  Treated w/surgical glue, oral antibx (Keflex).  Pt seen by PCP on 6/19/17 and referred to [facility]. Pt has been changing dressing daily, applying Neosporin and covering with a non-stick gauze.       Pain:  Denies at rest; tender to wound contact, or \"when I bang it\"          Past Medical History:  Past Medical History   Diagnosis Date   • UTI (urinary tract infection)      NOW AND 2 MONTHS AGO POST OP   • Hypertension    • HTN (hypertension) 1/12/2010   • Depression 1/12/2010   • Dyslipidemia    • Sleep apnea        Current Medications:  Current outpatient prescriptions:   •  hydrocodone-acetaminophen (NORCO) 5-325 MG Tab per tablet, Take 1-2 Tabs by mouth every four hours as needed., Disp: , Rfl:   •  cephALEXin (KEFLEX) 500 MG Cap, Take 1 Cap by mouth 3 times a day., Disp: 30 Cap, Rfl: 0  •  hydrochlorothiazide (HYDRODIURIL) 12.5 MG tablet, Take 1 Tab by mouth every day., Disp: 90 Tab, Rfl: 3  •  atorvastatin (LIPITOR) 10 MG Tab, TAKE ONE TABLET BY MOUTH ONCE DAILY, Disp: 90 Tab, Rfl: 3  •  clonazepam (KLONOPIN) 0.5 MG Tab, TAKE ONE TABLET BY MOUTH ONCE DAILY AT BEDTIME AS NEEDED, Disp: 30 Tab, Rfl: 3  •  benazepril (LOTENSIN) 40 MG tablet, TAKE ONE TABLET BY MOUTH ONCE DAILY, Disp: 30 Tab, Rfl: 11  •  amlodipine (NORVASC) 10 MG Tab, Take 1 Tab by mouth every day., Disp: 30 Tab, Rfl: 11  •  omeprazole (PRILOSEC) 40 MG delayed-release capsule, Take 1 Cap by mouth every day., Disp: 30 Cap, Rfl: 11  •  " multivitamin (THERAGRAN) TABS, Take 1 Tab by mouth every morning., Disp: , Rfl:     Allergies: Pcn    Past Surgical History:   Past Surgical History   Procedure Laterality Date   • Other       PROSTATE SURGERY   • Trans urethral resection prostate  3/19/2009     Performed by SAI AKERS at SURGERY [facility] ORS   • Gastroscopy-endo  3/13/2012     Performed by KARISHMA NEWMAN at ENDOSCOPY [facility] ORS   • Gastroscopy-endo  3/14/2012     Performed by MELANIA OROZCO at ENDOSCOPY [facility] ORS   • Tonsillectomy         Social History:    Social History     Social History   • Marital Status:      Spouse Name: N/A   • Number of Children: N/A   • Years of Education: N/A     Occupational History   • Not on file.     Social History Main Topics   • Smoking status: Former Smoker     Quit date: 01/01/1986   • Smokeless tobacco: Never Used   • Alcohol Use: 0.0 oz/week     0 Standard drinks or equivalent per week      Comment: rarely   • Drug Use: No   • Sexual Activity: Not on file     Other Topics Concern   • Not on file     Social History Narrative           Objective:      Tests and Measures:  Pulses: radial, ulnar palpable    Fall Risk Assessment (jose all that apply with an X):6/30/17 (-) fall risk   x65 years or older     Fall within the last 2 years, uses   Ambulatory devices  Loss of protective sensation in feet,   Use of prostethic/orthotic, years    Presence of lower extremity/foot/toe amputation   xTaking medication that increases risk (per facility policy)    Interventions Recommended (if any of the above are selected):   Use of Assistive Device:________________________   Supervision with ambulation:  Caregiver   Assistance with ambulation:  Caregiver   Home safety education:  Educational material provided    Orthotic, protective, supportive devices: none-pt has metal finger guard not needed     Wound Characteristics                                                    Location:  R  hand  distal digit 2 Initial Evaluation  Date:6/30/17   Tissue Type and %: 50/50 pale pink/red boggy   Periwound: Macerated (debrided this tx)   Drainage: BIANCA   Exposed structures none   Wound Edges:   ragged   Odor: None post tx   S&S of Infection:   none   Edema: local   Sensation: intact               Measurements:  Rhand distal digit 2 Initial Evaluation  Date:6/30/17   Length (cm) 1.2   Width (cm) 2   Depth (cm) ua   Area (cm2) 2.4   Tract/undermine none        Procedures:     Debridement :  Selective using forceps and scissors to remove 2.4cm2 lifting remnants of surgical glue and non-viable tissue covering wound bed   Cleansed with:    NS                                                                      Periwound protected with:skin prep   Primary dressing:honey alginate   Secondary Dressing:non-ad foam secured w/hypafix   Other: pronet 3     Patient Education: Pt and spouse instructed in wound care POC, dressing selection rationale and maintenance, effective LE edema control/ LE elevation pressure relief strategies. Instructed pt to keep dressing dry until next visit.  Instructed to remove pronet if uncomfortable-given additional pronet to change prn soiling or loosening.  Pt and spouse verbalized understanding.    Professional Collaboration: Initial assessment sent to Dr. Hernandez via [facility]      Assessment:      PT Evaluation 74961  Reference:  History: 21444  Exam of body systems: 10557  Clinical presentation: 66443    Wound etiology: trauma    Wound Progress: Initial assessment; progress to be determined     Rationale for Treatment:honey alginate for antimicrobial, exudate control,autolytic properties with longer wear time.       Patient tolerance/compliance: Pt verbalized understanding of initial instructions and education; consented to POC    Complicating factors:HTN    Need for ongoing Advanced Wound Care services:Pt requires continued skilled wound care for compression bandaging, sharp debridement prn,  dressing management and application, patient education, and clinical observation       Plan:      Treatment Plan and Recommendations:  Diagnosis/ICD10: S61.291D    Procedures/CPT:selective, non-selective debridement    Frequency: 2x/week      Treatment Goals: STG 2 Weeks  LTG 4 Weeks   Granulation Tissue: 100% resolved   Decrease Necrotic Tissue to: 0%    Wound Phase:  prolif    Decrease Size by: 50%    Periwound:      Decrease tracts/undermining by: %    Decrease Pain:   0/10       At the time of each visit a thorough assessment of the patient is completed to assure the  appropriateness of our plan of care.  The dressings or modalities may need to be adapted   from the original plan to address any significant changes in the wound environment.          Clinician Signature:_______Fanny Bowen PT___________Date____6/30/17________    Physician Signature:______________________________Date:__________________

## 2017-07-03 ENCOUNTER — NON-PROVIDER VISIT (OUTPATIENT)
Dept: WOUND CARE | Facility: MEDICAL CENTER | Age: 72
End: 2017-07-03
Attending: INTERNAL MEDICINE
Payer: MEDICARE

## 2017-07-03 PROCEDURE — 97602 WOUND(S) CARE NON-SELECTIVE: CPT

## 2017-07-03 PROCEDURE — A6402 STERILE GAUZE <= 16 SQ IN: HCPCS

## 2017-07-03 NOTE — WOUND TEAM
"Advanced Wound Care  [facility]  [address]  [address]  [place] [address]  [phone] Fax: [phone]      Initial Evaluation  For Certification Period:6/30/17-7/30/17            Referring Physician: Brayden Dobbins MD  Primary Physician:     same  Consulting Physicians:         Wound(s):R hand distal digit 2  Start of Care:6/30/17        Subjective:        HPI:        71 year old male referred by PCP for treatment wound R hand distal digit 1. Onset 6/15/17, laceration from power saw. Pt seen in .  Treated w/surgical glue, oral antibx (Keflex).  Pt seen by PCP on 6/19/17 and referred to [facility]. Pt has been changing dressing daily, applying Neosporin and covering with a non-stick gauze.       Pain:  Denies at rest; tender to wound contact, or \"when I bang it\"          Past Medical History:  Past Medical History   Diagnosis Date   • UTI (urinary tract infection)      NOW AND 2 MONTHS AGO POST OP   • Hypertension    • HTN (hypertension) 1/12/2010   • Depression 1/12/2010   • Dyslipidemia    • Sleep apnea        Current Medications:  Current outpatient prescriptions:   •  hydrocodone-acetaminophen (NORCO) 5-325 MG Tab per tablet, Take 1-2 Tabs by mouth every four hours as needed., Disp: , Rfl:   •  cephALEXin (KEFLEX) 500 MG Cap, Take 1 Cap by mouth 3 times a day., Disp: 30 Cap, Rfl: 0  •  hydrochlorothiazide (HYDRODIURIL) 12.5 MG tablet, Take 1 Tab by mouth every day., Disp: 90 Tab, Rfl: 3  •  atorvastatin (LIPITOR) 10 MG Tab, TAKE ONE TABLET BY MOUTH ONCE DAILY, Disp: 90 Tab, Rfl: 3  •  clonazepam (KLONOPIN) 0.5 MG Tab, TAKE ONE TABLET BY MOUTH ONCE DAILY AT BEDTIME AS NEEDED, Disp: 30 Tab, Rfl: 3  •  benazepril (LOTENSIN) 40 MG tablet, TAKE ONE TABLET BY MOUTH ONCE DAILY, Disp: 30 Tab, Rfl: 11  •  amlodipine (NORVASC) 10 MG Tab, Take 1 Tab by mouth every day., Disp: 30 Tab, Rfl: 11  •  omeprazole (PRILOSEC) 40 MG delayed-release capsule, Take 1 Cap by mouth every day., Disp: 30 Cap, Rfl: 11  •  " multivitamin (THERAGRAN) TABS, Take 1 Tab by mouth every morning., Disp: , Rfl:     Allergies: Pcn    Past Surgical History:   Past Surgical History   Procedure Laterality Date   • Other       PROSTATE SURGERY   • Trans urethral resection prostate  3/19/2009     Performed by SAI AKERS at SURGERY [facility] ORS   • Gastroscopy-endo  3/13/2012     Performed by KARISHMA NEWMAN at ENDOSCOPY [facility] ORS   • Gastroscopy-endo  3/14/2012     Performed by MELANIA OROZCO at ENDOSCOPY [facility] ORS   • Tonsillectomy         Social History:    Social History     Social History   • Marital Status:      Spouse Name: N/A   • Number of Children: N/A   • Years of Education: N/A     Occupational History   • Not on file.     Social History Main Topics   • Smoking status: Former Smoker     Quit date: 01/01/1986   • Smokeless tobacco: Never Used   • Alcohol Use: 0.0 oz/week     0 Standard drinks or equivalent per week      Comment: rarely   • Drug Use: No   • Sexual Activity: Not on file     Other Topics Concern   • Not on file     Social History Narrative           Objective:      Tests and Measures:  Pulses: radial, ulnar palpable    Fall Risk Assessment (jose all that apply with an X):6/30/17 (-) fall risk   x65 years or older     Fall within the last 2 years, uses   Ambulatory devices  Loss of protective sensation in feet,   Use of prostethic/orthotic, years    Presence of lower extremity/foot/toe amputation   xTaking medication that increases risk (per facility policy)    Interventions Recommended (if any of the above are selected):   Use of Assistive Device:________________________   Supervision with ambulation:  Caregiver   Assistance with ambulation:  Caregiver   Home safety education:  Educational material provided    Orthotic, protective, supportive devices: none-pt has metal finger guard not needed     Wound Characteristics                                                    Location:  R  hand  distal digit 2 Initial Evaluation  Date:6/30/17 Encounter  7/3/17   Tissue Type and %: 50/50 pale pink/red boggy 50% pink, 50% red with bleeding.   Periwound: Macerated (debrided this tx) intact   Drainage: BIANCA Scant, ss and honey   Exposed structures none None   Wound Edges:   ragged ragged   Odor: None post tx none   S&S of Infection:   none none   Edema: local local   Sensation: intact intact               Measurements:  Rhand distal digit 2 Initial Evaluation  Date:6/30/17    Length (cm) 1.2    Width (cm) 2    Depth (cm) ua    Area (cm2) 2.4    Tract/undermine none         Procedures:     Debridement : Non selective debridement with gauze and cotton tipped applicator.   Cleansed with:    NS                                                                      Periwound protected with:skin prep   Primary dressing:honey alginate   Secondary Dressing:non-ad foam secured w/hypafix   Other:     Patient Education:Pt. Verbalized signs and symptoms of infection, including erythema, induration, increased pain, increased drainage and fever and to go to ER if any of these occur.    Previous appt:  Pt and spouse instructed in wound care POC, dressing selection rationale and maintenance, effective LE edema control/ LE elevation pressure relief strategies. Instructed pt to keep dressing dry until next visit.  Instructed to remove pronet if uncomfortable-given additional pronet to change prn soiling or loosening.  Pt and spouse verbalized understanding.    Professional Collaboration: None today.      Assessment:      PT Evaluation 65885  Reference:  History: 08719  Exam of body systems: 97732  Clinical presentation: 77906    Wound etiology: trauma    Wound Progress:No s/sx infection.     Rationale for Treatment:honey alginate for antimicrobial, exudate control,autolytic properties with longer wear time.       Patient tolerance/compliance: Pt verbalized understanding of initial instructions and education; consented to  POC    Complicating factors:HTN    Need for ongoing Advanced Wound Care services:Pt requires continued skilled wound care for compression bandaging, sharp debridement prn, dressing management and application, patient education, and clinical observation       Plan:      Treatment Plan and Recommendations:  Diagnosis/ICD10: S61.291D    Procedures/CPT:selective, non-selective debridement    Frequency: 2x/week      Treatment Goals: STG 2 Weeks  LTG 4 Weeks   Granulation Tissue: 100% resolved   Decrease Necrotic Tissue to: 0%    Wound Phase:  prolif    Decrease Size by: 50%    Periwound:      Decrease tracts/undermining by: %    Decrease Pain:   0/10       At the time of each visit a thorough assessment of the patient is completed to assure the  appropriateness of our plan of care.  The dressings or modalities may need to be adapted   from the original plan to address any significant changes in the wound environment.          Clinician Signature:_______Fanny BowenPT___________Date____6/30/17________    Physician Signature:______________________________Date:__________________

## 2017-07-06 ENCOUNTER — OFFICE VISIT (OUTPATIENT)
Dept: WOUND CARE | Facility: MEDICAL CENTER | Age: 72
End: 2017-07-06
Attending: INTERNAL MEDICINE
Payer: MEDICARE

## 2017-07-06 DIAGNOSIS — S61.219D FINGER LACERATION, SUBSEQUENT ENCOUNTER: ICD-10-CM

## 2017-07-06 PROCEDURE — A6402 STERILE GAUZE <= 16 SQ IN: HCPCS

## 2017-07-06 PROCEDURE — 303972 HCHG HYPAFIX RET DRST 18SQ PC"

## 2017-07-06 PROCEDURE — 99214 OFFICE O/P EST MOD 30 MIN: CPT | Performed by: NURSE PRACTITIONER

## 2017-07-06 PROCEDURE — 97602 WOUND(S) CARE NON-SELECTIVE: CPT

## 2017-07-06 ASSESSMENT — ENCOUNTER SYMPTOMS
DIZZINESS: 0
FEVER: 0
DIARRHEA: 0
VOMITING: 1
NAUSEA: 0
CHILLS: 0
CONSTIPATION: 0

## 2017-07-06 NOTE — PROGRESS NOTES
Advanced Wound Care  [facility]  [address]  [address]  [place] [address]  [phone] Fax: [phone]    Encounter Note          Referring Physician: Brayden Dobbins MD  Primary Physician:     same  Consulting Physicians:         Wound(s): R hand distal digit 2  Start of Care: 6/30/17    Subjective:      Herber Hamilton is a 71 y.o. male who presents with a full thickness wound to his distal right 2nd finger      Patient seen in collaboration with wound care clinician, Evette Cormier RN    HPI Patient is a 71-year-old male with history of hypertension and dyslipidemia,  who is being treated in the wound clinic for a wound to his distal right 2nd finger. He lacerated this finger with a power saw on 6/15/17. He went to Urgent Care where the laceration was glued, and he was prescribed Keflex x 10 days. He was also referred to the wound clinic for follow up treatment of the wound.  He started treatment in the clinic on 6/30/17, at which time the wound was noted to be open.  It was recommended that he return to the clinic 2x/week for debridement and treatment of this wound.    He is seen by me today for an initial provider assessment.    Past Medical History   Diagnosis Date   • UTI (urinary tract infection)      NOW AND 2 MONTHS AGO POST OP   • Hypertension    • HTN (hypertension) 1/12/2010   • Depression 1/12/2010   • Dyslipidemia    • Sleep apnea      Past Surgical History   Procedure Laterality Date   • Other       PROSTATE SURGERY   • Trans urethral resection prostate  3/19/2009     Performed by SAI AKERS at SURGERY [facility] ORS   • Gastroscopy-endo  3/13/2012     Performed by KARISHMA NEWMAN at ENDOSCOPY [facility] ORS   • Gastroscopy-endo  3/14/2012     Performed by MELANIA OROZCO at ENDOSCOPY [facility] ORS   • Tonsillectomy       Current outpatient prescriptions:   •  hydrochlorothiazide (HYDRODIURIL) 12.5 MG tablet, Take 1 Tab by mouth every day., Disp: 90 Tab, Rfl:  3  •  atorvastatin (LIPITOR) 10 MG Tab, TAKE ONE TABLET BY MOUTH ONCE DAILY, Disp: 90 Tab, Rfl: 3  •  clonazepam (KLONOPIN) 0.5 MG Tab, TAKE ONE TABLET BY MOUTH ONCE DAILY AT BEDTIME AS NEEDED, Disp: 30 Tab, Rfl: 3  •  benazepril (LOTENSIN) 40 MG tablet, TAKE ONE TABLET BY MOUTH ONCE DAILY, Disp: 30 Tab, Rfl: 11  •  amlodipine (NORVASC) 10 MG Tab, Take 1 Tab by mouth every day., Disp: 30 Tab, Rfl: 11  •  omeprazole (PRILOSEC) 40 MG delayed-release capsule, Take 1 Cap by mouth every day., Disp: 30 Cap, Rfl: 11  •  multivitamin (THERAGRAN) TABS, Take 1 Tab by mouth every morning., Disp: , Rfl:   •  hydrocodone-acetaminophen (NORCO) 5-325 MG Tab per tablet, Take 1-2 Tabs by mouth every four hours as needed., Disp: , Rfl:   •  cephALEXin (KEFLEX) 500 MG Cap, Take 1 Cap by mouth 3 times a day., Disp: 30 Cap, Rfl: 0   Allergies   Allergen Reactions   • Pcn [Penicillins]      Social History     Social History   • Marital Status:      Spouse Name: N/A   • Number of Children: N/A   • Years of Education: N/A     Occupational History   • Not on file.     Social History Main Topics   • Smoking status: Former Smoker     Quit date: 01/01/1986   • Smokeless tobacco: Never Used   • Alcohol Use: 0.0 oz/week     0 Standard drinks or equivalent per week      Comment: rarely   • Drug Use: No   • Sexual Activity: Not on file     Other Topics Concern   • Not on file     Social History Narrative        Review of Systems   Constitutional: Negative for fever and chills.   Cardiovascular: Negative for chest pain and leg swelling.   Gastrointestinal: Positive for vomiting. Negative for nausea, diarrhea and constipation.   Neurological: Negative for dizziness.        Numbness in end of right index finger          Objective:         Physical Exam   Constitutional: He appears well-developed.   Eyes: Pupils are equal, round, and reactive to light.   Cardiovascular: Intact distal pulses.    Pulmonary/Chest: Effort normal.   Musculoskeletal:  Normal range of motion.   Neurological: He is alert.   Skin: Skin is warm and dry.   Open wound to distal right index finger 0.8 x 1.9 x ua  Cm  Wound bed red, clean  Edema to fingertip     Psychiatric: He has a normal mood and affect.     Wound Characteristics                                                     Location:  R  hand distal digit 2  Initial Evaluation  Date:6/30/17  Encounter Date:  7/6/17    Tissue Type and %:  50/50 pale pink/red boggy  90% pink, 10% dark red areas    Periwound:  Macerated (debrided this tx)  Intact    Drainage:  BIANCA  Scant ss and honey    Exposed structures  None  None    Wound Edges:    ragged  Ragged    Odor:  None post tx  None    S&S of Infection:    None  None    Edema:  Local  Local    Sensation:  intact  Intact                 Measurements:  Rhand distal digit 2  Initial Evaluation  Date:6/30/17  Encounter Date:  7/6/17    Length (cm)  1.2  0.8    Width (cm)  2  1.9     Depth (cm)  ua  BIANCA    Area (cm2)  2.4  1.52 cm2    Tract/undermine  None  None                 PROCEDURE  Wound care completed by Evette       Assessment/Plan:           ICD-10-CM   1. Finger laceration, subsequent encounter- Traumatic wound from power saw.  No s/sx infection. The wound has decreased in size by 0.88cm2 in just over a week.  Continue current treatment. Anticipate resolution of this wound in 2-4 weeks S61.219D

## 2017-07-06 NOTE — WOUND TEAM
"Advanced Wound Care  [facility]  [address]  [address]  [place] [address]  [phone] Fax: [phone]    Encounter Note  For Certification Period: 6/30/17 - 7/30/17        Referring Physician: Brayden Dobbins MD  Primary Physician:     same  Consulting Physicians:         Wound(s): R hand distal digit 2  Start of Care: 6/30/17        Subjective:        HPI: 71 year old male referred by PCP for treatment wound R hand distal digit 1. Onset 6/15/17, laceration from power saw. Pt seen in . Treated w/surgical glue, oral antibx (Keflex). Pt seen by PCP on 6/19/17 and referred to [facility]. Pt has been changing dressing daily, applying Neosporin and covering with a non-stick gauze.       Pain:  Denies at rest; tender to wound contact, or \"when I bang it\"          Current Medications:   Current outpatient prescriptions:   •  hydrochlorothiazide (HYDRODIURIL) 12.5 MG tablet, Take 1 Tab by mouth every day., Disp: 90 Tab, Rfl: 3  •  atorvastatin (LIPITOR) 10 MG Tab, TAKE ONE TABLET BY MOUTH ONCE DAILY, Disp: 90 Tab, Rfl: 3  •  clonazepam (KLONOPIN) 0.5 MG Tab, TAKE ONE TABLET BY MOUTH ONCE DAILY AT BEDTIME AS NEEDED, Disp: 30 Tab, Rfl: 3  •  benazepril (LOTENSIN) 40 MG tablet, TAKE ONE TABLET BY MOUTH ONCE DAILY, Disp: 30 Tab, Rfl: 11  •  amlodipine (NORVASC) 10 MG Tab, Take 1 Tab by mouth every day., Disp: 30 Tab, Rfl: 11  •  omeprazole (PRILOSEC) 40 MG delayed-release capsule, Take 1 Cap by mouth every day., Disp: 30 Cap, Rfl: 11  •  multivitamin (THERAGRAN) TABS, Take 1 Tab by mouth every morning., Disp: , Rfl:   •  hydrocodone-acetaminophen (NORCO) 5-325 MG Tab per tablet, Take 1-2 Tabs by mouth every four hours as needed., Disp: , Rfl:   •  cephALEXin (KEFLEX) 500 MG Cap, Take 1 Cap by mouth 3 times a day., Disp: 30 Cap, Rfl: 0    Allergies: Pcn     Objective:      Tests and Measures:  Pulses: radial, ulnar palpable    Fall Risk Assessment (jose all that apply with an X):6/30/17 (-) fall risk   x65 " years or older     Fall within the last 2 years, uses   Ambulatory devices  Loss of protective sensation in feet,   Use of prostethic/orthotic, years    Presence of lower extremity/foot/toe amputation   xTaking medication that increases risk (per facility policy)    Interventions Recommended (if any of the above are selected):   Use of Assistive Device:________________________   Supervision with ambulation:  Caregiver   Assistance with ambulation:  Caregiver   Home safety education:  Educational material provided    Orthotic, protective, supportive devices: none-pt has metal finger guard not needed     Wound Characteristics                                                    Location:  R  hand distal digit 2 Initial Evaluation  Date:6/30/17 Encounter Date:  7/6/17   Tissue Type and %: 50/50 pale pink/red boggy 90% pink, 10% dark red areas   Periwound: Macerated (debrided this tx) Intact   Drainage: BIANCA Scant ss and honey   Exposed structures None None   Wound Edges:   ragged Ragged   Odor: None post tx None   S&S of Infection:   None None   Edema: Local Local   Sensation: intact Intact               Measurements:  Rhand distal digit 2 Initial Evaluation  Date:6/30/17 Encounter Date:  7/6/17   Length (cm) 1.2 0.8   Width (cm) 2 1.9    Depth (cm) ua BIANCA   Area (cm2) 2.4 1.52 cm2   Tract/undermine None None        Procedures:     Debridement: Non selective debridement with gauze and cotton tipped applicator to remove biofilm   Cleansed with:    NS                                                                      Periwound protected with: Skin prep, zinc barrier paste   Primary dressing: Honey alginate   Secondary Dressing: Non-ad foam secured w/hypafix   Other:     Patient Education: Discussed that wound is smaller per measurements, discussed POC, wound care rationale. Pt verbalized signs and symptoms of infection, including erythema, induration, increased pain, increased drainage and fever and to go to ER if any of  these occur.    Professional Collaboration: Clover TOMLINSON to evaluate wound as there was no provider available for initial evaluation.       Assessment:      PT Evaluation 87482  Reference:  History: 75995  Exam of body systems: 87601  Clinical presentation: 17944    Wound etiology: trauma    Wound Progress: Wound smaller per measurements, less pain.     Rationale for Treatment: Honey alginate for antimicrobial, exudate control,autolytic properties with longer wear time.     Patient tolerance/compliance: Pt verbalized understanding of initial instructions and education; consented to POC    Complicating factors:HTN    Need for ongoing Advanced Wound Care services:Pt requires continued skilled wound care for compression bandaging, sharp debridement prn, dressing management and application, patient education, and clinical observation       Plan:      Treatment Plan and Recommendations:  Diagnosis/ICD10: S61.291D    Procedures/CPT:selective, non-selective debridement    Frequency: 2x/week      Treatment Goals: STG 2 Weeks  LTG 4 Weeks   Granulation Tissue: 100% resolved   Decrease Necrotic Tissue to: 0%    Wound Phase:  prolif    Decrease Size by: 50%    Periwound:      Decrease tracts/undermining by: %    Decrease Pain:   0/10       At the time of each visit a thorough assessment of the patient is completed to assure the  appropriateness of our plan of care.  The dressings or modalities may need to be adapted   from the original plan to address any significant changes in the wound environment.          Clinician Signature:_______Fanny Bowen PT___________Date____6/30/17________    Physician Signature:______________________________Date:__________________

## 2017-07-10 ENCOUNTER — NON-PROVIDER VISIT (OUTPATIENT)
Dept: WOUND CARE | Facility: MEDICAL CENTER | Age: 72
End: 2017-07-10
Attending: INTERNAL MEDICINE
Payer: MEDICARE

## 2017-07-10 PROBLEM — S61.219A FINGER LACERATION: Status: ACTIVE | Noted: 2017-07-10

## 2017-07-10 PROCEDURE — A6402 STERILE GAUZE <= 16 SQ IN: HCPCS

## 2017-07-10 PROCEDURE — 97602 WOUND(S) CARE NON-SELECTIVE: CPT

## 2017-07-10 NOTE — WOUND TEAM
"Advanced Wound Care  [facility]  [address]  [address]  [place] [address]  [phone] Fax: [phone]    Encounter Note  For Certification Period: 6/30/17 - 7/30/17        Referring Physician: Brayden Dobbins MD  Primary Physician:     same  Consulting Physicians:         Wound(s): R hand distal digit 2  Start of Care: 6/30/17        Subjective:        HPI: 71 year old male referred by PCP for treatment wound R hand distal digit 1. Onset 6/15/17, laceration from power saw. Pt seen in . Treated w/surgical glue, oral antibx (Keflex). Pt seen by PCP on 6/19/17 and referred to [facility]. Pt has been changing dressing daily, applying Neosporin and covering with a non-stick gauze.       Pain:  Denies at rest; tender to wound contact, or \"when I bang it\"          Current Medications:   Current outpatient prescriptions:   •  hydrocodone-acetaminophen (NORCO) 5-325 MG Tab per tablet, Take 1-2 Tabs by mouth every four hours as needed., Disp: , Rfl:   •  cephALEXin (KEFLEX) 500 MG Cap, Take 1 Cap by mouth 3 times a day., Disp: 30 Cap, Rfl: 0  •  hydrochlorothiazide (HYDRODIURIL) 12.5 MG tablet, Take 1 Tab by mouth every day., Disp: 90 Tab, Rfl: 3  •  atorvastatin (LIPITOR) 10 MG Tab, TAKE ONE TABLET BY MOUTH ONCE DAILY, Disp: 90 Tab, Rfl: 3  •  clonazepam (KLONOPIN) 0.5 MG Tab, TAKE ONE TABLET BY MOUTH ONCE DAILY AT BEDTIME AS NEEDED, Disp: 30 Tab, Rfl: 3  •  benazepril (LOTENSIN) 40 MG tablet, TAKE ONE TABLET BY MOUTH ONCE DAILY, Disp: 30 Tab, Rfl: 11  •  amlodipine (NORVASC) 10 MG Tab, Take 1 Tab by mouth every day., Disp: 30 Tab, Rfl: 11  •  omeprazole (PRILOSEC) 40 MG delayed-release capsule, Take 1 Cap by mouth every day., Disp: 30 Cap, Rfl: 11  •  multivitamin (THERAGRAN) TABS, Take 1 Tab by mouth every morning., Disp: , Rfl:     Allergies: Pcn     Objective:      Tests and Measures:  Pulses: radial, ulnar palpable    Fall Risk Assessment (jose all that apply with an X):6/30/17 (-) fall risk   x65 " years or older     Fall within the last 2 years, uses   Ambulatory devices  Loss of protective sensation in feet,   Use of prostethic/orthotic, years    Presence of lower extremity/foot/toe amputation   xTaking medication that increases risk (per facility policy)    Interventions Recommended (if any of the above are selected):   Use of Assistive Device:________________________   Supervision with ambulation:  Caregiver   Assistance with ambulation:  Caregiver   Home safety education:  Educational material provided    Orthotic, protective, supportive devices: none-pt has metal finger guard not needed     Wound Characteristics                                                    Location:  R  hand distal digit 2 Initial Evaluation  Date:6/30/17 Encounter Date:  7/10/17   Tissue Type and %: 50/50 pale pink/red boggy 100% dark red areas   Periwound: Macerated (debrided this tx) Hyperkeratosis, partial nail bed   Drainage: BIANCA Scant ss    Exposed structures None None   Wound Edges:   ragged irregular   Odor: None post tx None   S&S of Infection:   None None   Edema: Local Local   Sensation: intact Intact               Measurements:  Rhand distal digit 2 Initial Evaluation  Date:6/30/17 Encounter Date:  7/6/17   Length (cm) 1.2 0.8   Width (cm) 2 1.9    Depth (cm) ua BIANCA   Area (cm2) 2.4 1.52 cm2   Tract/undermine None None        Procedures:     Debridement: Non selective debridement with gauze and cotton tipped applicator    Cleansed with:    NS                                                                      Periwound protected with: Skin prep, zinc barrier paste   Primary dressing: Honey alginate   Secondary Dressing: Non-ad foam secured w/hypafix   Other: pt covers with coban at home     Patient Education:     Professional Collaboration:     Assessment:      PT Evaluation 25926  Reference:  History: 17724  Exam of body systems: 61825  Clinical presentation: 15553    Wound etiology: trauma    Wound Progress: Wound  smaller per measurements, less pain.     Rationale for Treatment: Honey alginate for antimicrobial, exudate control,autolytic properties with longer wear time.     Patient tolerance/compliance: Pt verbalized understanding of initial instructions and education; consented to POC    Complicating factors:HTN    Need for ongoing Advanced Wound Care services:Pt requires continued skilled wound care for compression bandaging, sharp debridement prn, dressing management and application, patient education, and clinical observation       Plan:      Treatment Plan and Recommendations:  Diagnosis/ICD10: S61.291D    Procedures/CPT:selective, non-selective debridement    Frequency: 2x/week      Treatment Goals: STG 2 Weeks  LTG 4 Weeks   Granulation Tissue: 100% resolved   Decrease Necrotic Tissue to: 0%    Wound Phase:  prolif    Decrease Size by: 50%    Periwound:      Decrease tracts/undermining by: %    Decrease Pain:   0/10       At the time of each visit a thorough assessment of the patient is completed to assure the  appropriateness of our plan of care.  The dressings or modalities may need to be adapted   from the original plan to address any significant changes in the wound environment.          Clinician Signature:_______Fanny BowenPT___________Date____6/30/17________    Physician Signature:______________________________Date:__________________

## 2017-07-13 ENCOUNTER — NON-PROVIDER VISIT (OUTPATIENT)
Dept: WOUND CARE | Facility: MEDICAL CENTER | Age: 72
End: 2017-07-13
Attending: INTERNAL MEDICINE
Payer: MEDICARE

## 2017-07-13 PROCEDURE — A6402 STERILE GAUZE <= 16 SQ IN: HCPCS

## 2017-07-13 PROCEDURE — 303972 HCHG HYPAFIX RET DRST 18SQ PC"

## 2017-07-13 PROCEDURE — 97602 WOUND(S) CARE NON-SELECTIVE: CPT

## 2017-07-13 NOTE — WOUND TEAM
Advanced Wound Care  [facility]  [address]  [address]  [place] [address]  [phone] Fax: [phone]    Encounter Note  For Certification Period: 6/30/17 - 7/30/17        Referring Physician: Brayden Dobbnis MD  Primary Physician:     same  Consulting Physicians:         Wound(s): R hand distal digit 2  Start of Care: 6/30/17        Subjective:        HPI: 71 year old male referred by PCP for treatment wound R hand distal digit 1. Onset 6/15/17, laceration from power saw. Pt seen in . Treated w/surgical glue, oral antibx (Keflex). Pt seen by PCP on 6/19/17 and referred to [facility]. Pt has been changing dressing daily, applying Neosporin and covering with a non-stick gauze.       Pain:  Denies today      Current Medications: no changes per pt    Allergies: Pcn     Objective:      Tests and Measures: none today    Pulses: radial, ulnar palpable    Fall Risk Assessment (jose all that apply with an X):6/30/17 (-) fall risk   x65 years or older     Fall within the last 2 years, uses   Ambulatory devices  Loss of protective sensation in feet,   Use of prostethic/orthotic, years    Presence of lower extremity/foot/toe amputation   xTaking medication that increases risk (per facility policy)    Interventions Recommended (if any of the above are selected):   Use of Assistive Device:________________________   Supervision with ambulation:  Caregiver   Assistance with ambulation:  Caregiver   Home safety education:  Educational material provided    Orthotic, protective, supportive devices: none-pt has metal finger guard not needed     Wound Characteristics                                                    Location:  R  hand distal digit 2 Initial Evaluation  Date:6/30/17 Encounter note  Date: 7/13/17   Tissue Type and %: 50/50 pale pink/red boggy 100%  Red/pink tissue   Periwound: Macerated (debrided this tx) Hyperkeratosis, partial nail bed   Drainage: BIANCA Scant ss    Exposed structures None None   Wound  Edges:   ragged Open, irregular   Odor: None post tx None   S&S of Infection:   None None   Edema: Local Local   Sensation: intact Intact               Measurements:  Rhand distal digit 2 Initial Evaluation  Date:6/30/17 Encounter note  Date: 7/13/17   Length (cm) 1.2 0.7   Width (cm) 2 0.2    Depth (cm) ua 0.1   Area (cm2) 2.4 0.14 cm2   Tract/undermine None None        Procedures:     Debridement: Non selective debridement with gauze and cotton tipped applicator    Cleansed with:    NS to wound, no rinse foam cleanser to hand                                                                      Periwound protected with: no sting skin prep, zinc barrier paste   Primary dressing: Aquacel Ag hydrofiber   Secondary Dressing: Non-ad foam secured w/hypafix   Other: pt covers with coban at home     Patient Education: Discussed POC, wound care rationale, dressing selection and to return to [facility] twice weekly for appts. Pt instructed to keep dressing CDI and to return to ER for any s/s infection, n/v, fever or chills. Pt verbalized understanding to all.    Professional Collaboration: none today   Assessment:      Wound etiology: trauma    Wound Progress: Wound smaller per measurements      Rationale for Treatment: AqAg to manage bioburden, absorb exudate, and maintain moist wound environment without laterally wicking exudate therefore reducing brisa-wound maceration     Patient tolerance/compliance: Pt verbalized understanding of initial instructions and education; consented to POC    Complicating factors:HTN    Need for ongoing Advanced Wound Care services:Pt requires continued skilled wound care for compression bandaging, sharp debridement prn, dressing management and application, patient education, and clinical observation       Plan:      Treatment Plan and Recommendations:  Diagnosis/ICD10: S61.291D    Procedures/CPT: non-selective debridement 80637    Frequency: 2x/week      Treatment Goals: STG 2 Weeks  LTG 4 Weeks    Granulation Tissue: 100% resolved   Decrease Necrotic Tissue to: 0%    Wound Phase:  prolif    Decrease Size by: 50%    Periwound:      Decrease tracts/undermining by: %    Decrease Pain:   0/10       At the time of each visit a thorough assessment of the patient is completed to assure the  appropriateness of our plan of care.  The dressings or modalities may need to be adapted   from the original plan to address any significant changes in the wound environment.          Clinician Signature:_______ ___________Date____ ________    Physician Signature:______________________________Date:__________________

## 2017-07-18 ENCOUNTER — NON-PROVIDER VISIT (OUTPATIENT)
Dept: WOUND CARE | Facility: MEDICAL CENTER | Age: 72
End: 2017-07-18
Attending: INTERNAL MEDICINE
Payer: MEDICARE

## 2017-07-18 PROCEDURE — 303972 HCHG HYPAFIX RET DRST 18SQ PC"

## 2017-07-18 PROCEDURE — A6402 STERILE GAUZE <= 16 SQ IN: HCPCS

## 2017-07-18 PROCEDURE — 97597 DBRDMT OPN WND 1ST 20 CM/<: CPT

## 2017-07-18 NOTE — WOUND TEAM
Advanced Wound Care  [facility]  [address]  [address]  [place] [address]  [phone] Fax: [phone]    Encounter Note  For Certification Period: 6/30/17 - 7/30/17        Referring Physician: Brayden Dobbins MD  Primary Physician:     same  Consulting Physicians:         Wound(s): R hand distal digit 2  Start of Care: 6/30/17        Subjective:        HPI: 71 year old male referred by PCP for treatment wound R hand distal digit 1. Onset 6/15/17, laceration from power saw. Pt seen in . Treated w/surgical glue, oral antibx (Keflex). Pt seen by PCP on 6/19/17 and referred to [facility]. Pt has been changing dressing daily, applying Neosporin and covering with a non-stick gauze.       Pain:  Denies today      Current Medications: no changes per pt    Allergies: Pcn     Objective:      Tests and Measures: none today    Pulses: radial, ulnar palpable    Fall Risk Assessment (jose all that apply with an X):6/30/17 (-) fall risk   x65 years or older     Fall within the last 2 years, uses   Ambulatory devices  Loss of protective sensation in feet,   Use of prostethic/orthotic, years    Presence of lower extremity/foot/toe amputation   xTaking medication that increases risk (per facility policy)    Interventions Recommended (if any of the above are selected):   Use of Assistive Device:________________________   Supervision with ambulation:  Caregiver   Assistance with ambulation:  Caregiver   Home safety education:  Educational material provided    Orthotic, protective, supportive devices: none-pt has metal finger guard not needed     Wound Characteristics                                                    Location:  R  hand distal digit 2 Initial Evaluation  Date:6/30/17 Encounter note  Date: 7/13/17 Encounter note  Date: 07/18/2017   Tissue Type and %: 50/50 pale pink/red boggy 100%  Red/pink tissue Resolved with new scar tissue   Periwound: Macerated (debrided this tx) Hyperkeratosis, partial nail bed     Drainage: BIANCA Scant ss     Exposed structures None None    Wound Edges:   ragged Open, irregular    Odor: None post tx None    S&S of Infection:   None None    Edema: Local Local    Sensation: intact Intact                Measurements:  Rhand distal digit 2 Initial Evaluation  Date:6/30/17 Encounter note  Date: 7/13/17 Encounter note  Date: 07/18/2017   Length (cm) 1.2 0.7 resolved   Width (cm) 2 0.2     Depth (cm) ua 0.1    Area (cm2) 2.4 0.14 cm2    Tract/undermine None None         Procedures:     Debridement: CSWD with forceps to remove non viable crusted biofilm and macerated tissue    Cleansed with:    NS to wound, no rinse foam cleanser to hand                                                                      Periwound protected with: no sting skin prep, sween cream   Primary dressing: none   Secondary Dressing: Non-ad foam secured w/hypafix   Other: pt covers with coban at home     Patient Education: Wound is resolved today with new scar tissue, nail bed still forming. Instructed pt to leave protective dressing in place and to return in one week for wound check and will likely be discharged at that time. Pt verbalized understanding.    Professional Collaboration: none today   Assessment:      Wound etiology: trauma    Wound Progress: Wound resolved today      Rationale for Treatment: sween cream to protect new epithelial tissue    Patient tolerance/compliance: Pt verbalized understanding of initial instructions and education; consented to POC    Complicating factors:HTN    Need for ongoing Advanced Wound Care services:Pt requires continued skilled wound care for compression bandaging, sharp debridement prn, dressing management and application, patient education, and clinical observation       Plan:      Treatment Plan and Recommendations:  Diagnosis/ICD10: S61.291D    Procedures/CPT:CSWD 29490    Frequency: 2x/week      Treatment Goals: STG 2 Weeks  LTG 4 Weeks   Granulation Tissue: 100% resolved    Decrease Necrotic Tissue to: 0%    Wound Phase:  prolif    Decrease Size by: 50%    Periwound:      Decrease tracts/undermining by: %    Decrease Pain:   0/10       At the time of each visit a thorough assessment of the patient is completed to assure the  appropriateness of our plan of care.  The dressings or modalities may need to be adapted   from the original plan to address any significant changes in the wound environment.          Clinician Signature:_______ ___________Date____ ________    Physician Signature:______________________________Date:__________________

## 2017-07-21 ENCOUNTER — APPOINTMENT (OUTPATIENT)
Dept: WOUND CARE | Facility: MEDICAL CENTER | Age: 72
End: 2017-07-21
Attending: INTERNAL MEDICINE
Payer: MEDICARE

## 2017-07-24 ENCOUNTER — NON-PROVIDER VISIT (OUTPATIENT)
Dept: WOUND CARE | Facility: MEDICAL CENTER | Age: 72
End: 2017-07-24
Attending: INTERNAL MEDICINE
Payer: MEDICARE

## 2017-07-24 PROCEDURE — 99211 OFF/OP EST MAY X REQ PHY/QHP: CPT

## 2017-07-24 NOTE — CERTIFICATION
Advanced Wound Care  [facility]  [address]  [address]  [place] [address]  [phone] Fax: [phone]    Discharge  For Certification Period: 6/30/17 - 7/30/17        Referring Physician: Brayden Dobbins MD  Primary Physician:     same  Consulting Physicians:         Wound(s): R hand distal digit 2  Start of Care: 6/30/17        Subjective:        HPI: 71 year old male referred by PCP for treatment wound R hand distal digit 1. Onset 6/15/17, laceration from power saw. Pt seen in . Treated w/surgical glue, oral antibx (Keflex). Pt seen by PCP on 6/19/17 and referred to [facility]. Pt has been changing dressing daily, applying Neosporin and covering with a non-stick gauze.       Pain:  Denies today      Current Medications: no changes per pt    Allergies: Pcn     Objective:      Tests and Measures: none today    Pulses: radial, ulnar palpable    Fall Risk Assessment (jose all that apply with an X):6/30/17 (-) fall risk   x65 years or older     Fall within the last 2 years, uses   Ambulatory devices  Loss of protective sensation in feet,   Use of prostethic/orthotic, years    Presence of lower extremity/foot/toe amputation   xTaking medication that increases risk (per facility policy)    Interventions Recommended (if any of the above are selected):   Use of Assistive Device:________________________   Supervision with ambulation:  Caregiver   Assistance with ambulation:  Caregiver   Home safety education:  Educational material provided    Orthotic, protective, supportive devices: none-pt has metal finger guard not needed     Wound Characteristics                                                    Location:  R  hand distal digit 2 Initial Evaluation  Date:6/30/17 Encounter note  Date: 7/13/17 Encounter note  Date: 07/24/2017   Tissue Type and %: 50/50 pale pink/red boggy 100%  Red/pink tissue Resolved with new scar tissue   Periwound: Macerated (debrided this tx) Hyperkeratosis, partial nail bed     Drainage: BIANCA Scant ss     Exposed structures None None    Wound Edges:   ragged Open, irregular    Odor: None post tx None    S&S of Infection:   None None    Edema: Local Local    Sensation: intact Intact                Measurements:  Rhand distal digit 2 Initial Evaluation  Date:6/30/17 Encounter note  Date: 7/13/17 Encounter note  Date: 07/24/2017   Length (cm) 1.2 0.7 resolved   Width (cm) 2 0.2     Depth (cm) ua 0.1    Area (cm2) 2.4 0.14 cm2    Tract/undermine None None         Procedures:  Wound continues to be resolved. Pt discharged. Verbalized understanding he should return to provider if he had any s/sx infection, including swelling, erythema, drainage, increased pain.       Debridement: CSWD with forceps to remove non viable crusted biofilm and macerated tissue    Cleansed with:    NS to wound, no rinse foam cleanser to hand                                                                      Periwound protected with: no sting skin prep, sween cream   Primary dressing: none   Secondary Dressing: Non-ad foam secured w/hypafix   Other: pt covers with coban at home     Patient Education: Wound is resolved today with new scar tissue, nail bed still forming. Instructed pt to leave protective dressing in place and to return in one week for wound check and will likely be discharged at that time. Pt verbalized understanding.    Professional Collaboration: none today   Assessment:      Wound etiology: trauma    Wound Progress: Wound resolved today. 7/24/17 - Continues to be resolved. Pt discharged.      Rationale for Treatment: sween cream to protect new epithelial tissue    Patient tolerance/compliance: Pt verbalized understanding of initial instructions and education; consented to POC    Complicating factors:HTN    Need for ongoing Advanced Wound Care services:Pt requires continued skilled wound care for compression bandaging, sharp debridement prn, dressing management and application, patient education, and  clinical observation       Plan:      Treatment Plan and Recommendations:  Diagnosis/ICD10: S61.291D    Procedures/CPT:CSWD 06010    Frequency: 2x/week      Treatment Goals: STG 2 Weeks  LTG 4 Weeks   Granulation Tissue: 100% resolved   Decrease Necrotic Tissue to: 0%    Wound Phase:  prolif    Decrease Size by: 50%    Periwound:      Decrease tracts/undermining by: %    Decrease Pain:   0/10       At the time of each visit a thorough assessment of the patient is completed to assure the  appropriateness of our plan of care.  The dressings or modalities may need to be adapted   from the original plan to address any significant changes in the wound environment.          Clinician Signature:_______ ___________Date____ ________    Physician Signature:______________________________Date:__________________

## 2017-07-27 ENCOUNTER — APPOINTMENT (OUTPATIENT)
Dept: WOUND CARE | Facility: MEDICAL CENTER | Age: 72
End: 2017-07-27
Attending: INTERNAL MEDICINE
Payer: MEDICARE

## 2017-07-31 ENCOUNTER — APPOINTMENT (OUTPATIENT)
Dept: WOUND CARE | Facility: MEDICAL CENTER | Age: 72
End: 2017-07-31
Attending: INTERNAL MEDICINE
Payer: MEDICARE

## 2017-08-21 DIAGNOSIS — G47.00 INSOMNIA, UNSPECIFIED TYPE: ICD-10-CM

## 2017-08-21 RX ORDER — CLONAZEPAM 0.5 MG/1
TABLET ORAL
Qty: 30 TAB | Refills: 2 | Status: SHIPPED | OUTPATIENT
Start: 2017-08-21 | End: 2017-11-06

## 2017-08-21 NOTE — TELEPHONE ENCOUNTER
Caller Name: Herber Hamilton                 Call Back Number: [phone] (home)         Patient approves a detailed voicemail message: yes    Have we ever prescribed this med? Yes.  If yes, what date? 4/24/17    Last OV: 6/8/17    Next OV: annual follow up- no visit on file    DX: Primary Insomnis    Medications:  KlonoPin

## 2017-08-25 NOTE — TELEPHONE ENCOUNTER
Denisa queen/ Amparo in pharmacy, states that the request for Clonazepam was received on 8/22/17 but insurance company will not cover the rx until 9/18/17.

## 2017-10-31 ENCOUNTER — HOSPITAL ENCOUNTER (OUTPATIENT)
Dept: LAB | Facility: MEDICAL CENTER | Age: 72
End: 2017-10-31
Attending: INTERNAL MEDICINE
Payer: MEDICARE

## 2017-10-31 DIAGNOSIS — E87.6 HYPOKALEMIA: ICD-10-CM

## 2017-10-31 LAB
ANION GAP SERPL CALC-SCNC: 8 MMOL/L (ref 0–11.9)
BUN SERPL-MCNC: 20 MG/DL (ref 8–22)
CALCIUM SERPL-MCNC: 9.5 MG/DL (ref 8.5–10.5)
CHLORIDE SERPL-SCNC: 106 MMOL/L (ref 96–112)
CO2 SERPL-SCNC: 26 MMOL/L (ref 20–33)
CREAT SERPL-MCNC: 0.99 MG/DL (ref 0.5–1.4)
GFR SERPL CREATININE-BSD FRML MDRD: >60 ML/MIN/1.73 M 2
GLUCOSE SERPL-MCNC: 88 MG/DL (ref 65–99)
POTASSIUM SERPL-SCNC: 3.8 MMOL/L (ref 3.6–5.5)
SODIUM SERPL-SCNC: 140 MMOL/L (ref 135–145)

## 2017-10-31 PROCEDURE — 80048 BASIC METABOLIC PNL TOTAL CA: CPT

## 2017-10-31 PROCEDURE — 36415 COLL VENOUS BLD VENIPUNCTURE: CPT

## 2017-11-06 ENCOUNTER — OFFICE VISIT (OUTPATIENT)
Dept: MEDICAL GROUP | Facility: MEDICAL CENTER | Age: 72
End: 2017-11-06
Payer: MEDICARE

## 2017-11-06 VITALS
DIASTOLIC BLOOD PRESSURE: 78 MMHG | WEIGHT: 179.4 LBS | TEMPERATURE: 97.8 F | SYSTOLIC BLOOD PRESSURE: 124 MMHG | HEART RATE: 65 BPM | HEIGHT: 70 IN | OXYGEN SATURATION: 99 % | BODY MASS INDEX: 25.68 KG/M2 | RESPIRATION RATE: 16 BRPM

## 2017-11-06 DIAGNOSIS — N40.1 BENIGN PROSTATIC HYPERPLASIA WITH LOWER URINARY TRACT SYMPTOMS, SYMPTOM DETAILS UNSPECIFIED: ICD-10-CM

## 2017-11-06 DIAGNOSIS — E78.5 DYSLIPIDEMIA: ICD-10-CM

## 2017-11-06 DIAGNOSIS — G25.81 RLS (RESTLESS LEGS SYNDROME): ICD-10-CM

## 2017-11-06 DIAGNOSIS — I10 ESSENTIAL HYPERTENSION: ICD-10-CM

## 2017-11-06 DIAGNOSIS — K63.5 POLYP OF COLON, UNSPECIFIED PART OF COLON, UNSPECIFIED TYPE: ICD-10-CM

## 2017-11-06 DIAGNOSIS — G47.33 OSA (OBSTRUCTIVE SLEEP APNEA): ICD-10-CM

## 2017-11-06 DIAGNOSIS — F51.01 PRIMARY INSOMNIA: ICD-10-CM

## 2017-11-06 DIAGNOSIS — R51.9 NONINTRACTABLE HEADACHE, UNSPECIFIED CHRONICITY PATTERN, UNSPECIFIED HEADACHE TYPE: ICD-10-CM

## 2017-11-06 DIAGNOSIS — Z00.00 MEDICARE ANNUAL WELLNESS VISIT, SUBSEQUENT: ICD-10-CM

## 2017-11-06 PROBLEM — S61.219A FINGER LACERATION: Status: RESOLVED | Noted: 2017-07-10 | Resolved: 2017-11-06

## 2017-11-06 PROCEDURE — G0439 PPPS, SUBSEQ VISIT: HCPCS | Mod: 25 | Performed by: INTERNAL MEDICINE

## 2017-11-06 PROCEDURE — 99214 OFFICE O/P EST MOD 30 MIN: CPT | Mod: 25 | Performed by: INTERNAL MEDICINE

## 2017-11-06 RX ORDER — TRAZODONE HYDROCHLORIDE 50 MG/1
50 TABLET ORAL
Qty: 30 TAB | Refills: 3 | Status: SHIPPED | OUTPATIENT
Start: 2017-11-06 | End: 2018-05-07

## 2017-11-06 ASSESSMENT — PATIENT HEALTH QUESTIONNAIRE - PHQ9: CLINICAL INTERPRETATION OF PHQ2 SCORE: 0

## 2017-11-06 NOTE — PROGRESS NOTES
CC: Follow-up multiple issues due for wellness examination.    HPI:   Herber presents today with the following.    1. Medicare annual wellness visit, subsequent  Screenings performed below    2. Nonintractable headache, unspecified chronicity pattern, unspecified headache type  Reports he has been having headaches for the last 2 weeks mostly with bending over. States it's a 6 out of 10 in intensity resolving when he stands up. He denies any nausea vomiting no slurred speech blurred vision focal numbness or weakness.    3. Primary insomnia  He is both on pain medications and medications for sleep. He states that he does not take pain medications nightly but does take clonazepam regularly.    4. Essential hypertension  Blood pressures been well controlled denying any chest pain or shortness breath no edema.    5. ALFRED (obstructive sleep apnea)  Followed by sleep specialist.    6. RLS (restless legs syndrome)  Does have restless legs never been on other medications but reports, as parents mostly for sleep.    7. Benign prostatic hyperplasia with lower urinary tract symptoms, symptom details unspecified  Reports symptoms are stable.    8. Dyslipidemia  Maintain on statin without myalgias.    9. Polyp of colon, unspecified part of colon, unspecified type  No blood in stool or black stool.      Depression Screening    Little interest or pleasure in doing things?  0 - not at all  Feeling down, depressed , or hopeless? 0 - not at all  Patient Health Questionnaire Score: 0     If depressive symptoms identified deferred to follow up visit unless specifically addressed in assessment and plan.    Interpretation of PHQ-9 Total Score   Score Severity   1-4 No Depression   5-9 Mild Depression   10-14 Moderate Depression   15-19 Moderately Severe Depression   20-27 Severe Depression    Screening for Cognitive Impairment    Three Minute Recall (apple, watch, godwin)  2/3 Kitchen/Village/Baby  Draw clock face with all 12 numbers set  to the hand to show 10 minutes past 11 o'clock  1 5/5  Cognitive concerns identified deferred for follow up unless specifically addressed in assessment and plan.    Fall Risk Assessment    Has the patient had two or more falls in the last year or any fall with injury in the last year?  No    Safety Assessment    Throw rugs on floor.  Yes  Handrails on all stairs.  No  Good lighting in all hallways.  Yes  Difficulty hearing.  Yes  Patient counseled about all safety risks that were identified.    Functional Assessment ADLs    Are there any barriers preventing you from cooking for yourself or meeting nutritional needs?  No.    Are there any barriers preventing you from driving safely or obtaining transportation?  No.    Are there any barriers preventing you from using a telephone or calling for help?  No.    Are there any barriers preventing you from shopping?  No.    Are there any barriers preventing you from taking care of your own finances?  No.    Are there any barriers preventing you from managing your medications?  No.    Are currently engaging any exercise or physical activity?  Yes.  Walking dogs    Health Maintenance Summary                Annual Wellness Visit Next Due 11/7/2018      Done 11/6/2017 Visit Dx: Medicare annual wellness visit, subsequent     Patient has more history with this topic...    COLONOSCOPY Next Due 5/22/2020      Done 5/22/2015 AMB REFERRAL TO GI FOR COLONOSCOPY    IMM DTaP/Tdap/Td Vaccine Next Due 5/9/2023      Done 5/9/2013 Imm Admin: Tdap Vaccine          Patient Care Team:  Brayden Dobbins M.D. as PCP - General  Alexandro Ortez M.D. as Consulting Physician (Gastroenterology)  JORDYN Tracey as Consulting Physician (Pulmonary Medicine)  Jake Nolasco M.D. as Consulting Physician (Internal Medicine)  Mookie Jack M.D. as Consulting Physician (Phys Med and Rehab)      Patient Active Problem List    Diagnosis Date Noted   • Benign prostatic hyperplasia with  "lower urinary tract symptoms 11/06/2017   • ALFRED (obstructive sleep apnea) 08/19/2016   • Primary insomnia 08/19/2016   • RLS (restless legs syndrome) 06/01/2015   • History of GI bleed 05/09/2013   • Colon polyp 05/02/2011   • Dyslipidemia 05/02/2011   • Essential hypertension 01/12/2010       Current Outpatient Prescriptions   Medication Sig Dispense Refill   • trazodone (DESYREL) 50 MG Tab Take 1 Tab by mouth every bedtime. 30 Tab 3   • hydrocodone-acetaminophen (NORCO) 5-325 MG Tab per tablet Take 1-2 Tabs by mouth every four hours as needed.     • hydrochlorothiazide (HYDRODIURIL) 12.5 MG tablet Take 1 Tab by mouth every day. 90 Tab 3   • atorvastatin (LIPITOR) 10 MG Tab TAKE ONE TABLET BY MOUTH ONCE DAILY 90 Tab 3   • benazepril (LOTENSIN) 40 MG tablet TAKE ONE TABLET BY MOUTH ONCE DAILY 30 Tab 11   • amlodipine (NORVASC) 10 MG Tab Take 1 Tab by mouth every day. 30 Tab 11   • omeprazole (PRILOSEC) 40 MG delayed-release capsule Take 1 Cap by mouth every day. 30 Cap 11   • multivitamin (THERAGRAN) TABS Take 1 Tab by mouth every morning.       No current facility-administered medications for this visit.          Allergies as of 11/06/2017 - Reviewed 11/06/2017   Allergen Reaction Noted   • Pcn [penicillins]  10/16/2009        ROS: As per HPI.    /78   Pulse 65   Temp 36.6 °C (97.8 °F)   Resp 16   Ht 1.778 m (5' 10\")   Wt 81.4 kg (179 lb 6.4 oz)   SpO2 99%   BMI 25.74 kg/m²     Physical Exam:  Gen:         Alert and oriented, No apparent distress.  Neck:        No Lymphadenopathy or Bruits.  Lungs:     Clear to auscultation bilaterally  CV:          Regular rate and rhythm. No murmurs, rubs or gallops.  Abd:         Soft non tender, non distended. Normal active bowel sounds.  No  Hepatosplenomegaly, No pulsatile masses.                   Ext:          No clubbing, cyanosis, edema.      Assessment and Plan.   71 y.o. male with the following issues.    1. Medicare annual wellness visit, " subsequent  Discussed healthy lifestyle habits as well as screening regimens.  - Annual Wellness Visit - Includes PPPS Subsequent ()    2. Nonintractable headache, unspecified chronicity pattern, unspecified headache type  Given new onset have written for CT he will go to the ER directly if he has increasing symptomatology or neurologic symptoms.  - CT-HEAD W/O; Future    3. Primary insomnia  Trying to discontinue clonazepam lacing on trazodone cautioned about side effects    - trazodone (DESYREL) 50 MG Tab; Take 1 Tab by mouth every bedtime.  Dispense: 30 Tab; Refill: 3    4. Essential hypertension  Currently well controlled, Discuss diet, exercise and salt restriction.    5. ALFRED (obstructive sleep apnea)  Follow along with sleep specialist  - Annual Wellness Visit - Includes PPPS Subsequent ()    6. RLS (restless legs syndrome)  If stopping clonazepam is problematic may consider other medications for restless legs.  - Annual Wellness Visit - Includes PPPS Subsequent ()    7. Benign prostatic hyperplasia with lower urinary tract symptoms, symptom details unspecified  Symptoms are stable  - Annual Wellness Visit - Includes PPPS Subsequent ()    8. Dyslipidemia  Continue statin  - Annual Wellness Visit - Includes PPPS Subsequent ()    9. Polyp of colon, unspecified part of colon, unspecified type  No new symptomology.  - Annual Wellness Visit - Includes PPPS Subsequent ()

## 2017-11-10 ENCOUNTER — HOSPITAL ENCOUNTER (OUTPATIENT)
Dept: RADIOLOGY | Facility: MEDICAL CENTER | Age: 72
End: 2017-11-10
Attending: INTERNAL MEDICINE
Payer: MEDICARE

## 2017-11-10 DIAGNOSIS — R51.9 NONINTRACTABLE HEADACHE, UNSPECIFIED CHRONICITY PATTERN, UNSPECIFIED HEADACHE TYPE: ICD-10-CM

## 2017-11-10 PROCEDURE — 70450 CT HEAD/BRAIN W/O DYE: CPT

## 2017-11-16 DIAGNOSIS — Z87.19 HISTORY OF GI BLEED: ICD-10-CM

## 2017-11-16 DIAGNOSIS — I10 ESSENTIAL HYPERTENSION: ICD-10-CM

## 2017-11-16 RX ORDER — AMLODIPINE BESYLATE 10 MG/1
TABLET ORAL
Qty: 30 TAB | Refills: 11 | Status: SHIPPED | OUTPATIENT
Start: 2017-11-16 | End: 2018-11-07 | Stop reason: SDUPTHER

## 2017-11-16 RX ORDER — OMEPRAZOLE 40 MG/1
CAPSULE, DELAYED RELEASE ORAL
Qty: 11 CAP | Refills: 11 | Status: SHIPPED | OUTPATIENT
Start: 2017-11-16 | End: 2018-11-07

## 2018-04-30 ENCOUNTER — OFFICE VISIT (OUTPATIENT)
Dept: URGENT CARE | Facility: PHYSICIAN GROUP | Age: 73
End: 2018-04-30
Payer: MEDICARE

## 2018-04-30 VITALS
BODY MASS INDEX: 26.48 KG/M2 | SYSTOLIC BLOOD PRESSURE: 120 MMHG | HEART RATE: 81 BPM | HEIGHT: 70 IN | WEIGHT: 185 LBS | TEMPERATURE: 98.7 F | OXYGEN SATURATION: 94 % | DIASTOLIC BLOOD PRESSURE: 66 MMHG

## 2018-04-30 DIAGNOSIS — J01.00 ACUTE MAXILLARY SINUSITIS, RECURRENCE NOT SPECIFIED: ICD-10-CM

## 2018-04-30 PROCEDURE — 99214 OFFICE O/P EST MOD 30 MIN: CPT | Performed by: NURSE PRACTITIONER

## 2018-04-30 RX ORDER — DOXYCYCLINE HYCLATE 100 MG
100 TABLET ORAL 2 TIMES DAILY
Qty: 14 TAB | Refills: 0 | Status: SHIPPED | OUTPATIENT
Start: 2018-04-30 | End: 2018-05-07

## 2018-04-30 ASSESSMENT — ENCOUNTER SYMPTOMS
FEVER: 0
DIARRHEA: 0
NAUSEA: 0
SORE THROAT: 0
COUGH: 0
SHORTNESS OF BREATH: 0
VOMITING: 0
EYE REDNESS: 0
CHILLS: 0
DIZZINESS: 0
MYALGIAS: 0
ABDOMINAL PAIN: 0
SINUS PAIN: 1
WEAKNESS: 0
EYE DISCHARGE: 0
CONSTIPATION: 0
HEADACHES: 0

## 2018-04-30 NOTE — PROGRESS NOTES
Subjective:      Herber Hamilton is a 72 y.o. male who presents with Sinus Problem (Congestion, sneezing x 3 wks )            HPI  Herber is here for sinus problems x 3 weeks, using flonase spray, dry scratchy throat, PND, no cough. Green thick nasal d/c and blood tinged, Ocean nasal rinse use.    PMH:  has a past medical history of Depression (1/12/2010); Dyslipidemia; HTN (hypertension) (1/12/2010); Hypertension; Sleep apnea; and UTI (urinary tract infection).  MEDS:   Current Outpatient Prescriptions:   •  doxycycline (VIBRAMYCIN) 100 MG Tab, Take 1 Tab by mouth 2 times a day., Disp: 14 Tab, Rfl: 0  •  benazepril (LOTENSIN) 40 MG tablet, TAKE ONE TABLET BY MOUTH ONCE DAILY, Disp: 90 Tab, Rfl: 3  •  amlodipine (NORVASC) 10 MG Tab, TAKE ONE TABLET BY MOUTH ONCE DAILY, Disp: 30 Tab, Rfl: 11  •  hydrocodone-acetaminophen (NORCO) 5-325 MG Tab per tablet, Take 1-2 Tabs by mouth every four hours as needed., Disp: , Rfl:   •  hydrochlorothiazide (HYDRODIURIL) 12.5 MG tablet, Take 1 Tab by mouth every day., Disp: 90 Tab, Rfl: 3  •  atorvastatin (LIPITOR) 10 MG Tab, TAKE ONE TABLET BY MOUTH ONCE DAILY, Disp: 90 Tab, Rfl: 3  •  multivitamin (THERAGRAN) TABS, Take 1 Tab by mouth every morning., Disp: , Rfl:   •  omeprazole (PRILOSEC) 40 MG delayed-release capsule, TAKE ONE CAPSULE BY MOUTH ONCE DAILY, Disp: 11 Cap, Rfl: 11  •  trazodone (DESYREL) 50 MG Tab, Take 1 Tab by mouth every bedtime., Disp: 30 Tab, Rfl: 3  ALLERGIES:   Allergies   Allergen Reactions   • Pcn [Penicillins]      SURGHX:   Past Surgical History:   Procedure Laterality Date   • GASTROSCOPY-ENDO  3/14/2012    Performed by MELANIA OROZCO at ENDOSCOPY [facility] ORS   • GASTROSCOPY-ENDO  3/13/2012    Performed by KARISHMA NEWMAN at ENDOSCOPY [facility] ORS   • TRANS URETHRAL RESECTION PROSTATE  3/19/2009    Performed by SAI AKERS at SURGERY [facility] ORS   • OTHER      PROSTATE SURGERY   • TONSILLECTOMY       SOCHX:  reports that he quit  "smoking about 32 years ago. He has never used smokeless tobacco. He reports that he drinks alcohol. He reports that he does not use drugs.  FH: Family history was reviewed, no pertinent findings to report    Review of Systems   Constitutional: Negative for chills, fever and malaise/fatigue.   HENT: Positive for congestion and sinus pain. Negative for ear pain and sore throat.    Eyes: Negative for discharge and redness.   Respiratory: Negative for cough and shortness of breath.    Gastrointestinal: Negative for abdominal pain, constipation, diarrhea, nausea and vomiting.   Musculoskeletal: Negative for myalgias.   Neurological: Negative for dizziness, weakness and headaches.   Endo/Heme/Allergies: Negative for environmental allergies.   All other systems reviewed and are negative.         Objective:     /66   Pulse 81   Temp 37.1 °C (98.7 °F)   Ht 1.778 m (5' 10\")   Wt 83.9 kg (185 lb)   SpO2 94%   BMI 26.54 kg/m²      Physical Exam   Constitutional: He is oriented to person, place, and time. He appears well-developed and well-nourished. He is active and cooperative.  Non-toxic appearance. He does not have a sickly appearance. He does not appear ill. No distress.   HENT:   Head: Normocephalic.   Right Ear: Hearing, tympanic membrane, external ear and ear canal normal.   Left Ear: Hearing, tympanic membrane, external ear and ear canal normal.   Nose: Mucosal edema and sinus tenderness present. No rhinorrhea.   Mouth/Throat: Uvula is midline. Mucous membranes are dry. No uvula swelling. No posterior oropharyngeal edema or posterior oropharyngeal erythema.   Eyes: Conjunctivae and EOM are normal. Pupils are equal, round, and reactive to light.   Neck: Normal range of motion.   Cardiovascular: Normal rate, regular rhythm and normal heart sounds.    Pulmonary/Chest: Effort normal and breath sounds normal. No accessory muscle usage. No respiratory distress. He has no decreased breath sounds. He has no wheezes. " He has no rhonchi. He has no rales.   Musculoskeletal: Normal range of motion.   Lymphadenopathy:     He has no cervical adenopathy.   Neurological: He is alert and oriented to person, place, and time.   Skin: Skin is warm and dry. He is not diaphoretic.   Vitals reviewed.              Assessment/Plan:   1. Acute maxillary sinusitis, recurrence not specified    - doxycycline (VIBRAMYCIN) 100 MG Tab; Take 1 Tab by mouth 2 times a day.  Dispense: 14 Tab; Refill: 0    Increase water intake  Get rest  May use Ibuprofen/Tylenol prn for any fever, body aches or throat pain  May take longer acting antihistamine for seasonal allergy symptoms prn  May use saline nasal spray for nasal congestion  May use Flonase or Nasocort for allergen nasal congestion  May gargle with salt water prn for throat discomfort  May drink smoothies for nutrition if too painful to swallow solid foods  Monitor for productive cough, SOB and chest pain/tightness- need re-evaluation

## 2018-05-07 ENCOUNTER — OFFICE VISIT (OUTPATIENT)
Dept: MEDICAL GROUP | Facility: MEDICAL CENTER | Age: 73
End: 2018-05-07
Payer: MEDICARE

## 2018-05-07 VITALS
HEART RATE: 80 BPM | HEIGHT: 70 IN | SYSTOLIC BLOOD PRESSURE: 116 MMHG | DIASTOLIC BLOOD PRESSURE: 58 MMHG | TEMPERATURE: 98.5 F | RESPIRATION RATE: 16 BRPM | BODY MASS INDEX: 26.05 KG/M2 | WEIGHT: 182 LBS | OXYGEN SATURATION: 99 %

## 2018-05-07 DIAGNOSIS — E78.5 DYSLIPIDEMIA: ICD-10-CM

## 2018-05-07 DIAGNOSIS — F33.3 SEVERE EPISODE OF RECURRENT MAJOR DEPRESSIVE DISORDER, WITH PSYCHOTIC FEATURES (HCC): ICD-10-CM

## 2018-05-07 DIAGNOSIS — G47.33 OSA (OBSTRUCTIVE SLEEP APNEA): ICD-10-CM

## 2018-05-07 DIAGNOSIS — Z00.00 MEDICARE ANNUAL WELLNESS VISIT, SUBSEQUENT: ICD-10-CM

## 2018-05-07 DIAGNOSIS — F43.10 PTSD (POST-TRAUMATIC STRESS DISORDER): ICD-10-CM

## 2018-05-07 DIAGNOSIS — F51.01 PRIMARY INSOMNIA: ICD-10-CM

## 2018-05-07 DIAGNOSIS — I10 ESSENTIAL HYPERTENSION: ICD-10-CM

## 2018-05-07 DIAGNOSIS — N40.1 BENIGN PROSTATIC HYPERPLASIA WITH LOWER URINARY TRACT SYMPTOMS, SYMPTOM DETAILS UNSPECIFIED: ICD-10-CM

## 2018-05-07 DIAGNOSIS — G25.81 RLS (RESTLESS LEGS SYNDROME): ICD-10-CM

## 2018-05-07 PROCEDURE — G0439 PPPS, SUBSEQ VISIT: HCPCS | Performed by: INTERNAL MEDICINE

## 2018-05-07 PROCEDURE — 99999 PR NO CHARGE: CPT | Mod: 25 | Performed by: INTERNAL MEDICINE

## 2018-05-07 ASSESSMENT — ACTIVITIES OF DAILY LIVING (ADL): BATHING_REQUIRES_ASSISTANCE: 0

## 2018-05-07 ASSESSMENT — PATIENT HEALTH QUESTIONNAIRE - PHQ9: CLINICAL INTERPRETATION OF PHQ2 SCORE: 0

## 2018-05-07 NOTE — PROGRESS NOTES
CC: Follow-up depression, due for wellness examination    HPI:   Herber presents today with the following.  Tod describes their overall health as fair.    1. Medicare annual wellness visit, subsequent  Screenings performed below information given on advanced directives.    2. Severe episode of recurrent major depressive disorder, with psychotic features (HCC)  Started on medication approximately 1 month ago reports his mood is dramatically improved.  He does not recall [facility] of the [facility]  He denies any side effects from medication.    3. PTSD (post-traumatic stress disorder)  Also diagnosed with PTSD again seeing psychology at the [facility].  Denies any other complaints today.        Information for advance directives given to patient or instructed to bring in advance directives into to office to put in chart.      Depression Screening    Little interest or pleasure in doing things?  0 - not at all  Feeling down, depressed , or hopeless? 0 - not at all  Patient Health Questionnaire Score: 0     If depressive symptoms identified deferred to follow up visit unless specifically addressed in assessment and plan.    Interpretation of PHQ-9 Total Score   Score Severity   1-4 No Depression   5-9 Mild Depression   10-14 Moderate Depression   15-19 Moderately Severe Depression   20-27 Severe Depression    Screening for Cognitive Impairment    Three Minute Recall (apple, watch, godwin)  2/3    Draw clock face with all 12 numbers set to the hand to show 10 minutes past 11 o'clock  1    Cognitive concerns identified deferred for follow up unless specifically addressed in assessment and plan.    Fall Risk Assessment    Has the patient had two or more falls in the last year or any fall with injury in the last year?       Safety Assessment    Throw rugs on floor.  No  Handrails on all stairs.  Yes  Good lighting in all hallways.  Yes  Difficulty hearing.  No  Patient counseled about all safety  risks that were identified.    Functional Assessment ADLs    Are there any barriers preventing you from cooking for yourself or meeting nutritional needs?  No.    Are there any barriers preventing you from driving safely or obtaining transportation?  No.    Are there any barriers preventing you from using a telephone or calling for help?  No.    Are there any barriers preventing you from shopping?  No.    Are there any barriers preventing you from taking care of your own finances?  No.    Are there any barriers preventing you from managing your medications?  No.    Are there any barriers preventing you from showering, bathing or dressing yourself?  No.    Are currently engaging any exercise or physical activity?  Yes.       Health Maintenance Summary                Annual Wellness Visit Next Due 11/7/2018      Done 11/6/2017 Visit Dx: Medicare annual wellness visit, subsequent     Patient has more history with this topic...    COLONOSCOPY Next Due 5/22/2020      Done 5/22/2015 AMB REFERRAL TO GI FOR COLONOSCOPY    IMM DTaP/Tdap/Td Vaccine Next Due 5/9/2023      Done 5/9/2013 Imm Admin: Tdap Vaccine          Patient Care Team:  Brayden Dobbins M.D. as PCP - General  Alexandro Ortez M.D. as Consulting Physician (Gastroenterology)  JORDYN Tracey as Consulting Physician (Pulmonary Medicine)  Jake Nolasco M.D. as Consulting Physician (Internal Medicine)  Mookie Jack M.D. as Consulting Physician (Phys Med and Rehab)            Health Care Screening: Age-appropriate preventive services that Medicare covers were discussed today and ordered as indicated and agreed upon by the patient, and as recommended by USPTF and ACIP.     Patient Active Problem List    Diagnosis Date Noted   • Severe episode of recurrent major depressive disorder, with psychotic features (HCC) 05/07/2018   • PTSD (post-traumatic stress disorder) 05/07/2018   • Benign prostatic hyperplasia with lower urinary tract symptoms  11/06/2017   • ALFRED (obstructive sleep apnea) 08/19/2016   • Primary insomnia 08/19/2016   • RLS (restless legs syndrome) 06/01/2015   • History of GI bleed 05/09/2013   • Colon polyp 05/02/2011   • Dyslipidemia 05/02/2011   • Essential hypertension 01/12/2010       Current Outpatient Prescriptions   Medication Sig Dispense Refill   • benazepril (LOTENSIN) 40 MG tablet TAKE ONE TABLET BY MOUTH ONCE DAILY 90 Tab 3   • amlodipine (NORVASC) 10 MG Tab TAKE ONE TABLET BY MOUTH ONCE DAILY 30 Tab 11   • omeprazole (PRILOSEC) 40 MG delayed-release capsule TAKE ONE CAPSULE BY MOUTH ONCE DAILY 11 Cap 11   • hydrocodone-acetaminophen (NORCO) 5-325 MG Tab per tablet Take 1-2 Tabs by mouth every four hours as needed.     • hydrochlorothiazide (HYDRODIURIL) 12.5 MG tablet Take 1 Tab by mouth every day. 90 Tab 3   • atorvastatin (LIPITOR) 10 MG Tab TAKE ONE TABLET BY MOUTH ONCE DAILY 90 Tab 3   • multivitamin (THERAGRAN) TABS Take 1 Tab by mouth every morning.       No current facility-administered medications for this visit.        Family History   Problem Relation Age of Onset   • Non-contributory Mother      unknown   • Heart Disease Father    • Arthritis Neg Hx    • Lung Disease Neg Hx    • Genetic Neg Hx    • Cancer Neg Hx    • Psychiatry Neg Hx    • Diabetes Neg Hx    • Hypertension Neg Hx    • Hyperlipidemia Neg Hx    • Stroke Neg Hx    • Alcohol/Drug Neg Hx        Social History     Social History   • Marital status:      Spouse name: N/A   • Number of children: N/A   • Years of education: N/A     Occupational History   • Not on file.     Social History Main Topics   • Smoking status: Former Smoker     Quit date: 1/1/1986   • Smokeless tobacco: Never Used   • Alcohol use 0.0 oz/week      Comment: rarely   • Drug use: No   • Sexual activity: Not on file     Other Topics Concern   • Not on file     Social History Narrative   • No narrative on file       Past Surgical History:   Procedure Laterality Date   •  "GASTROSCOPY-ENDO  3/14/2012    Performed by MELANIA OROZCO at ENDOSCOPY [facility] ORS   • GASTROSCOPY-ENDO  3/13/2012    Performed by KARISHMA NEWMAN at ENDOSCOPY [facility] ORS   • TRANS URETHRAL RESECTION PROSTATE  3/19/2009    Performed by SAI AKERS at SURGERY [facility] ORS   • OTHER      PROSTATE SURGERY   • TONSILLECTOMY         Allergies as of 05/07/2018 - Reviewed 05/07/2018   Allergen Reaction Noted   • Pcn [penicillins]  10/16/2009        ROS: Denies Chest pain, SOB, LE edema.    /58   Pulse 80   Temp 36.9 °C (98.5 °F)   Resp 16   Ht 1.778 m (5' 10\")   Wt 82.6 kg (182 lb)   SpO2 99%   BMI 26.11 kg/m²     Physical Exam:  Gen:         Alert and oriented, No apparent distress.  Neck:        No Lymphadenopathy or Bruits.  Lungs:     Clear to auscultation bilaterally  CV:          Regular rate and rhythm. No murmurs, rubs or gallops.  Abd:         Soft non tender, non distended. Normal active bowel sounds.  No  Hepatosplenomegaly, No pulsatile masses.                   Ext:          No clubbing, cyanosis, edema.      Assessment and Plan.   72 y.o. male with the following issues.    1. Medicare annual wellness visit, subsequent  Discussed healthy lifestyle habits as well as screening regimens.    2. Severe episode of recurrent major depressive disorder, with psychotic features (HCC)  Discussion about further counseling declines any further need for intervention.    3. PTSD (post-traumatic stress disorder)  [facility]    4. Benign prostatic hyperplasia with lower urinary tract symptoms, symptom details unspecified  Symptoms stable no change therapy    5. ALFRED (obstructive sleep apnea)  No change to treatment.    6. Essential hypertension  Currently well controlled, Discuss diet, exercise and salt restriction.  No change to medication therapy.    7. Primary insomnia  Reports sleeping is improved has discontinued trazodone.    8. Dyslipidemia  Lipids currently well " controlled. Discussed continued diet and exercise recheck 6 months to 1 year.    9. RLS (restless legs syndrome)  No change therapy          Referrals offered: Community-based lifestyle interventions to reduce health risks and promote self-management and wellness, fall prevention, nutrition, physical activity, tobacco-use cessation, weight loss, and mental health services as per orders if indicated.    Discussion today about general wellness and lifestyle habits:    · Prevent falls and reduce trip hazards; Cautioned about securing or removing rugs.  · Have a working fire alarm and carbon monoxide detector;   · Engage in regular physical activity and social activities

## 2018-05-15 RX ORDER — ATORVASTATIN CALCIUM 10 MG/1
10 TABLET, FILM COATED ORAL DAILY
Qty: 90 TAB | Refills: 3 | Status: SHIPPED | OUTPATIENT
Start: 2018-05-15 | End: 2019-05-07 | Stop reason: SDUPTHER

## 2018-11-07 ENCOUNTER — OFFICE VISIT (OUTPATIENT)
Dept: MEDICAL GROUP | Facility: MEDICAL CENTER | Age: 73
End: 2018-11-07
Payer: MEDICARE

## 2018-11-07 VITALS
HEART RATE: 83 BPM | WEIGHT: 184 LBS | HEIGHT: 70 IN | OXYGEN SATURATION: 95 % | SYSTOLIC BLOOD PRESSURE: 124 MMHG | BODY MASS INDEX: 26.34 KG/M2 | DIASTOLIC BLOOD PRESSURE: 82 MMHG | RESPIRATION RATE: 16 BRPM | TEMPERATURE: 98.7 F

## 2018-11-07 DIAGNOSIS — I10 ESSENTIAL HYPERTENSION: ICD-10-CM

## 2018-11-07 PROCEDURE — 99213 OFFICE O/P EST LOW 20 MIN: CPT | Performed by: INTERNAL MEDICINE

## 2018-11-07 RX ORDER — AMLODIPINE BESYLATE 10 MG/1
10 TABLET ORAL DAILY
Qty: 90 TAB | Refills: 3 | Status: SHIPPED | OUTPATIENT
Start: 2018-11-07 | End: 2019-11-26 | Stop reason: SDUPTHER

## 2018-11-07 ASSESSMENT — PATIENT HEALTH QUESTIONNAIRE - PHQ9
2. FEELING DOWN, DEPRESSED, IRRITABLE, OR HOPELESS: NOT AT ALL
6. FEELING BAD ABOUT YOURSELF - OR THAT YOU ARE A FAILURE OR HAVE LET YOURSELF OR YOUR FAMILY DOWN: NOT AL ALL
SUM OF ALL RESPONSES TO PHQ QUESTIONS 1-9: 1
8. MOVING OR SPEAKING SO SLOWLY THAT OTHER PEOPLE COULD HAVE NOTICED. OR THE OPPOSITE, BEING SO FIGETY OR RESTLESS THAT YOU HAVE BEEN MOVING AROUND A LOT MORE THAN USUAL: NOT AT ALL
SUM OF ALL RESPONSES TO PHQ9 QUESTIONS 1 AND 2: 0
3. TROUBLE FALLING OR STAYING ASLEEP OR SLEEPING TOO MUCH: NOT AT ALL
4. FEELING TIRED OR HAVING LITTLE ENERGY: SEVERAL DAYS
7. TROUBLE CONCENTRATING ON THINGS, SUCH AS READING THE NEWSPAPER OR WATCHING TELEVISION: NOT AT ALL
9. THOUGHTS THAT YOU WOULD BE BETTER OFF DEAD, OR OF HURTING YOURSELF: NOT AT ALL
5. POOR APPETITE OR OVEREATING: NOT AT ALL
1. LITTLE INTEREST OR PLEASURE IN DOING THINGS: NOT AT ALL

## 2019-02-18 RX ORDER — BENAZEPRIL HYDROCHLORIDE 40 MG/1
TABLET ORAL
Qty: 90 TAB | Refills: 3 | Status: SHIPPED | OUTPATIENT
Start: 2019-02-18 | End: 2020-03-03

## 2019-05-07 ENCOUNTER — OFFICE VISIT (OUTPATIENT)
Dept: MEDICAL GROUP | Facility: MEDICAL CENTER | Age: 74
End: 2019-05-07
Payer: MEDICARE

## 2019-05-07 VITALS
HEIGHT: 70 IN | HEART RATE: 87 BPM | SYSTOLIC BLOOD PRESSURE: 132 MMHG | TEMPERATURE: 98.2 F | OXYGEN SATURATION: 99 % | WEIGHT: 186 LBS | DIASTOLIC BLOOD PRESSURE: 66 MMHG | BODY MASS INDEX: 26.63 KG/M2

## 2019-05-07 DIAGNOSIS — F51.01 PRIMARY INSOMNIA: ICD-10-CM

## 2019-05-07 DIAGNOSIS — G25.81 RLS (RESTLESS LEGS SYNDROME): ICD-10-CM

## 2019-05-07 DIAGNOSIS — F33.3 SEVERE EPISODE OF RECURRENT MAJOR DEPRESSIVE DISORDER, WITH PSYCHOTIC FEATURES (HCC): ICD-10-CM

## 2019-05-07 DIAGNOSIS — R97.20 ELEVATED PSA: ICD-10-CM

## 2019-05-07 DIAGNOSIS — I10 ESSENTIAL HYPERTENSION: ICD-10-CM

## 2019-05-07 DIAGNOSIS — Z00.00 MEDICARE ANNUAL WELLNESS VISIT, SUBSEQUENT: ICD-10-CM

## 2019-05-07 DIAGNOSIS — E78.5 DYSLIPIDEMIA: ICD-10-CM

## 2019-05-07 DIAGNOSIS — N40.1 BENIGN PROSTATIC HYPERPLASIA WITH LOWER URINARY TRACT SYMPTOMS, SYMPTOM DETAILS UNSPECIFIED: ICD-10-CM

## 2019-05-07 PROCEDURE — G0439 PPPS, SUBSEQ VISIT: HCPCS | Performed by: INTERNAL MEDICINE

## 2019-05-07 PROCEDURE — 8041 PR SCP AHA: Performed by: INTERNAL MEDICINE

## 2019-05-07 RX ORDER — HYDROCHLOROTHIAZIDE 12.5 MG/1
12.5 TABLET ORAL DAILY
Qty: 90 TAB | Refills: 3 | Status: SHIPPED | OUTPATIENT
Start: 2019-05-07 | End: 2020-05-29

## 2019-05-07 RX ORDER — ATORVASTATIN CALCIUM 10 MG/1
10 TABLET, FILM COATED ORAL DAILY
Qty: 90 TAB | Refills: 3 | Status: SHIPPED | OUTPATIENT
Start: 2019-05-07 | End: 2020-06-14

## 2019-05-07 ASSESSMENT — ENCOUNTER SYMPTOMS: GENERAL WELL-BEING: GOOD

## 2019-05-07 ASSESSMENT — ACTIVITIES OF DAILY LIVING (ADL): BATHING_REQUIRES_ASSISTANCE: 0

## 2019-05-07 ASSESSMENT — PATIENT HEALTH QUESTIONNAIRE - PHQ9: CLINICAL INTERPRETATION OF PHQ2 SCORE: 0

## 2019-05-07 NOTE — PROGRESS NOTES
Annual Health Assessment Questions:    1.  Are you currently engaging in any exercise or physical activity? No    2.  How would you describe your mood or emotional well-being today? good    3.  Have you had any falls in the last year? No    4.  Have you noticed any problems with your balance or had difficulty walking? No    5.  In the last six months have you experienced any leakage of urine? No    6. DPA/Advanced Directive: Patient has Advanced Directive, but it is not on file. Instructed to bring in a copy to scan into their chart.    CCDyslipidemia, hypertension, due for wellness examination    HPI:   Herber presents today with the following.    1. Medicare annual wellness visit, subsequent  Screenings performed below information given on advanced directives.    2. Dyslipidemia  Maintain on statin without myalgia [facility] but do not have recent [facility].    3. Essential hypertension  [facility] blood pressure is on upper end of normal despite 3 drug therapy.  Reports good readings at home.  Denies any chest pain or shortness of breath no dizziness or medications    Depression Screening    Little interest or pleasure in doing things?  0 - not at all  Feeling down, depressed , or hopeless? 0 - not at all  Patient Health Questionnaire Score: 0     If depressive symptoms identified deferred to follow up visit unless specifically addressed in assessment and plan.    Interpretation of PHQ-9 Total Score   Score Severity   1-4 No Depression   5-9 Mild Depression   10-14 Moderate Depression   15-19 Moderately Severe Depression   20-27 Severe Depression    Screening for Cognitive Impairment    Three Minute Recall (village, kitchen, baby) 2/3    Lan clock face with all 12 numbers and set the hands to show 10 past 10.  Yes 5/5  Cognitive concerns identified deferred for follow up unless specifically addressed in assessment and plan.    Fall Risk Assessment    Has the patient  had two or more falls in the last year or any fall with injury in the last year?  No    Safety Assessment    Throw rugs on floor.  Yes  Handrails on all stairs.  Yes  Good lighting in all hallways.  Yes  Difficulty hearing.  Yes  Patient counseled about all safety risks that were identified.    Functional Assessment ADLs    Are there any barriers preventing you from cooking for yourself or meeting nutritional needs?  No.    Are there any barriers preventing you from driving safely or obtaining transportation?  No.    Are there any barriers preventing you from using a telephone or calling for help?  No.    Are there any barriers preventing you from shopping?  No.    Are there any barriers preventing you from taking care of your own finances?  No.    Are there any barriers preventing you from managing your medications?  No.    Are there any barriers preventing you from showering, bathing or dressing yourself?  No.    Are you currently engaging in any exercise or physical activity?  No.     What is your perception of your health?  Good.      Health Maintenance Summary                Annual Wellness Visit Next Due 5/7/2020      Done 5/7/2019 Visit Dx: Medicare annual wellness visit, subsequent     Patient has more history with this topic...    COLONOSCOPY Next Due 7/16/2021      Done 7/16/2018 REFERRAL TO GI FOR COLONOSCOPY     Patient has more history with this topic...    IMM DTaP/Tdap/Td Vaccine Next Due 5/9/2023      Done 5/9/2013 Imm Admin: Tdap Vaccine          Patient Care Team:  Brayden Dobbins M.D. as PCP - General  Alexandro Ortez M.D. as Consulting Physician (Gastroenterology)  JORDYN Tracey as Consulting Physician (Pulmonary Medicine)  Jake Nolasco as Consulting Physician (Internal Medicine)  Mookie Jack M.D. as Consulting Physician (Phys Med and Rehab)    Patient Active Problem List    Diagnosis Date Noted   • Elevated PSA 05/07/2019   • Severe episode of recurrent major  "depressive disorder, with psychotic features (HCC) 05/07/2018   • PTSD (post-traumatic stress disorder) 05/07/2018   • Benign prostatic hyperplasia with lower urinary tract symptoms 11/06/2017   • ALFRED (obstructive sleep apnea) 08/19/2016   • Primary insomnia 08/19/2016   • RLS (restless legs syndrome) 06/01/2015   • History of GI bleed 05/09/2013   • Colon polyp 05/02/2011   • Dyslipidemia 05/02/2011   • Essential hypertension 01/12/2010       Current Outpatient Prescriptions   Medication Sig Dispense Refill   • hydroCHLOROthiazide (HYDRODIURIL) 12.5 MG tablet Take 1 Tab by mouth every day. 90 Tab 3   • atorvastatin (LIPITOR) 10 MG Tab Take 1 Tab by mouth every day. 90 Tab 3   • benazepril (LOTENSIN) 40 MG tablet TAKE ONE TABLET BY MOUTH ONCE DAILY 90 Tab 3   • amLODIPine (NORVASC) 10 MG Tab Take 1 Tab by mouth every day. 90 Tab 3   • multivitamin (THERAGRAN) TABS Take 1 Tab by mouth every morning.       No current facility-administered medications for this visit.          Allergies as of 05/07/2019 - Reviewed 05/07/2019   Allergen Reaction Noted   • Pcn [penicillins]  10/16/2009        ROS: Denies Chest pain, SOB, LE edema.    /66 (BP Location: Right arm, Patient Position: Sitting)   Pulse 87   Temp 36.8 °C (98.2 °F)   Ht 1.778 m (5' 10\")   Wt 84.4 kg (186 lb)   SpO2 99%   BMI 26.69 kg/m²     Physical Exam:  Gen:         Alert and oriented, No apparent distress.  Neck:        No Lymphadenopathy or Bruits.  Lungs:     Clear to auscultation bilaterally  CV:          Regular rate and rhythm. No murmurs, rubs or gallops.               Ext:          No clubbing, cyanosis, edema.      Assessment and Plan.   73 y.o. male with the following issues.    1. Medicare annual wellness visit, subsequent  Discussed healthy lifestyle habits as well as screening regimens.    2. Dyslipidemia  Lipids currently well controlled. Discussed continued diet and exercise recheck 6 months to 1 year.  - Comp Metabolic Panel; " Future  - Lipid Profile; Future    3. Essential hypertension  Continue to monitor blood pressure refilled medications  - hydroCHLOROthiazide (HYDRODIURIL) 12.5 MG tablet; Take 1 Tab by mouth every day.  Dispense: 90 Tab; Refill: 3    4. Primary insomnia  Likely doing well without medication    5. Severe episode of recurrent major depressive disorder, with psychotic features (HCC)  Stable    6. Benign prostatic hyperplasia with lower urinary tract symptoms, symptom details unspecified  Denying any new symptomology    7. RLS (restless legs syndrome)  Stable    8. Elevated PSA  [facility]

## 2019-08-13 ENCOUNTER — HOSPITAL ENCOUNTER (OUTPATIENT)
Dept: RADIOLOGY | Facility: MEDICAL CENTER | Age: 74
End: 2019-08-13
Attending: PHYSICIAN ASSISTANT
Payer: MEDICARE

## 2019-08-13 DIAGNOSIS — M54.6 PAIN IN THORACIC SPINE: ICD-10-CM

## 2019-08-13 PROCEDURE — 72081 X-RAY EXAM ENTIRE SPI 1 VW: CPT

## 2019-11-26 DIAGNOSIS — I10 ESSENTIAL HYPERTENSION: ICD-10-CM

## 2019-11-26 RX ORDER — AMLODIPINE BESYLATE 10 MG/1
TABLET ORAL
Qty: 90 TAB | Refills: 1 | Status: SHIPPED | OUTPATIENT
Start: 2019-11-26 | End: 2020-05-29

## 2020-02-24 ENCOUNTER — PATIENT OUTREACH (OUTPATIENT)
Dept: HEALTH INFORMATION MANAGEMENT | Facility: OTHER | Age: 75
End: 2020-02-24

## 2020-03-03 RX ORDER — BENAZEPRIL HYDROCHLORIDE 40 MG/1
TABLET ORAL
Qty: 100 TAB | Refills: 0 | Status: SHIPPED | OUTPATIENT
Start: 2020-03-03 | End: 2020-06-14

## 2020-05-29 DIAGNOSIS — I10 ESSENTIAL HYPERTENSION: ICD-10-CM

## 2020-05-29 RX ORDER — HYDROCHLOROTHIAZIDE 12.5 MG/1
TABLET ORAL
Qty: 90 TAB | Refills: 0 | Status: SHIPPED | OUTPATIENT
Start: 2020-05-29 | End: 2020-07-07 | Stop reason: SDUPTHER

## 2020-05-29 RX ORDER — AMLODIPINE BESYLATE 10 MG/1
TABLET ORAL
Qty: 90 TAB | Refills: 0 | Status: SHIPPED | OUTPATIENT
Start: 2020-05-29 | End: 2020-07-07 | Stop reason: SDUPTHER

## 2020-05-29 NOTE — TELEPHONE ENCOUNTER
Received request via: Pharmacy    Was the patient seen in the last year in this department? No    Does the patient have an active prescription (recently filled or refills available) for medication(s) requested? No

## 2020-06-14 RX ORDER — BENAZEPRIL HYDROCHLORIDE 40 MG/1
TABLET ORAL
Qty: 100 TAB | Refills: 0 | Status: SHIPPED | OUTPATIENT
Start: 2020-06-14 | End: 2020-07-07 | Stop reason: SDUPTHER

## 2020-06-14 RX ORDER — ATORVASTATIN CALCIUM 10 MG/1
TABLET, FILM COATED ORAL
Qty: 100 TAB | Refills: 0 | Status: SHIPPED | OUTPATIENT
Start: 2020-06-14 | End: 2020-07-07 | Stop reason: SDUPTHER

## 2020-07-07 ENCOUNTER — OFFICE VISIT (OUTPATIENT)
Dept: MEDICAL GROUP | Facility: MEDICAL CENTER | Age: 75
End: 2020-07-07
Payer: MEDICARE

## 2020-07-07 VITALS
WEIGHT: 188 LBS | SYSTOLIC BLOOD PRESSURE: 136 MMHG | TEMPERATURE: 97.8 F | OXYGEN SATURATION: 95 % | DIASTOLIC BLOOD PRESSURE: 74 MMHG | BODY MASS INDEX: 26.32 KG/M2 | HEART RATE: 90 BPM | HEIGHT: 71 IN

## 2020-07-07 DIAGNOSIS — F33.3 SEVERE EPISODE OF RECURRENT MAJOR DEPRESSIVE DISORDER, WITH PSYCHOTIC FEATURES (HCC): ICD-10-CM

## 2020-07-07 DIAGNOSIS — R97.20 ELEVATED PSA: ICD-10-CM

## 2020-07-07 DIAGNOSIS — F43.10 POSTTRAUMATIC STRESS DISORDER: ICD-10-CM

## 2020-07-07 DIAGNOSIS — G25.81 RLS (RESTLESS LEGS SYNDROME): ICD-10-CM

## 2020-07-07 DIAGNOSIS — I10 ESSENTIAL HYPERTENSION: ICD-10-CM

## 2020-07-07 DIAGNOSIS — G47.33 OSA (OBSTRUCTIVE SLEEP APNEA): ICD-10-CM

## 2020-07-07 DIAGNOSIS — E78.5 DYSLIPIDEMIA: ICD-10-CM

## 2020-07-07 DIAGNOSIS — Z00.00 MEDICARE ANNUAL WELLNESS VISIT, SUBSEQUENT: ICD-10-CM

## 2020-07-07 PROCEDURE — 99213 OFFICE O/P EST LOW 20 MIN: CPT | Mod: 25 | Performed by: INTERNAL MEDICINE

## 2020-07-07 PROCEDURE — G0439 PPPS, SUBSEQ VISIT: HCPCS | Performed by: INTERNAL MEDICINE

## 2020-07-07 PROCEDURE — 8041 PR SCP AHA: Performed by: INTERNAL MEDICINE

## 2020-07-07 RX ORDER — HYDROCHLOROTHIAZIDE 12.5 MG/1
12.5 TABLET ORAL DAILY
Qty: 100 TAB | Refills: 3 | Status: SHIPPED | OUTPATIENT
Start: 2020-07-07 | End: 2023-07-19

## 2020-07-07 RX ORDER — BENAZEPRIL HYDROCHLORIDE 40 MG/1
40 TABLET ORAL DAILY
Qty: 100 TAB | Refills: 3 | Status: ON HOLD | OUTPATIENT
Start: 2020-07-07 | End: 2023-12-12

## 2020-07-07 RX ORDER — ATORVASTATIN CALCIUM 10 MG/1
10 TABLET, FILM COATED ORAL DAILY
Qty: 100 TAB | Refills: 3 | Status: SHIPPED | OUTPATIENT
Start: 2020-07-07

## 2020-07-07 RX ORDER — AMLODIPINE BESYLATE 10 MG/1
10 TABLET ORAL DAILY
Qty: 100 TAB | Refills: 3 | Status: ON HOLD | OUTPATIENT
Start: 2020-07-07 | End: 2023-12-12

## 2020-07-07 ASSESSMENT — PATIENT HEALTH QUESTIONNAIRE - PHQ9: CLINICAL INTERPRETATION OF PHQ2 SCORE: 0

## 2020-07-07 ASSESSMENT — ACTIVITIES OF DAILY LIVING (ADL): BATHING_REQUIRES_ASSISTANCE: 0

## 2020-07-07 ASSESSMENT — ENCOUNTER SYMPTOMS: GENERAL WELL-BEING: EXCELLENT

## 2020-07-07 NOTE — PROGRESS NOTES
Annual Health Assessment Questions:    1.  Are you currently engaging in any exercise or physical activity? No    2.  How would you describe your mood or emotional well-being today? good    3.  Have you had any falls in the last year? No    4.  Have you noticed any problems with your balance or had difficulty walking? Yes    5.  In the last six months have you experienced any leakage of urine? No    6. DPA/Advanced Directive: Patient has Advanced Directive, but it is not on file. Instructed to bring in a copy to scan into their chart.

## 2020-07-07 NOTE — PROGRESS NOTES
Annual Health Assessment Questions:    1.  Are you currently engaging in any exercise or physical activity? No    2.  How would you describe your mood or emotional well-being today? good    3.  Have you had any falls in the last year? No    4.  Have you noticed any problems with your balance or had difficulty walking? Yes    5.  In the last six months have you experienced any leakage of urine? No    6. DPA/Advanced Directive: Patient has Advanced Directive, but it is not on file. Instructed to bring in a copy to scan into their chart.        CC: Follow-up hypertension, dyslipidemia, due for wellness examination.    HPI:   Herber presents today with the following.      1. Medicare annual wellness visit, subsequent  Screenings performed below advance directives already on file [facility]    2. Essential hypertension  Maintained on 3 drug therapy reporting good [facility] as [facility].  He is requesting refills of medication    3. Dyslipidemia  Maintained on statin coming due for blood work.  Denying any myalgia has no other significant complaints today.      Depression Screening    Little interest or pleasure in doing things?  0 - not at all  Feeling down, depressed , or hopeless? 0 - not at all  Patient Health Questionnaire Score: 0     If depressive symptoms identified deferred to follow up visit unless specifically addressed in assessment and plan.    Interpretation of PHQ-9 Total Score   Score Severity   1-4 No Depression   5-9 Mild Depression   10-14 Moderate Depression   15-19 Moderately Severe Depression   20-27 Severe Depression    Screening for Cognitive Impairment    Three Minute Recall (river, nation, finger) 3/3    Lan clock face with all 12 numbers and set the hands to show 10 past 11.  Yes 5/5  Cognitive concerns identified deferred for follow up unless specifically addressed in assessment and plan.    Fall Risk Assessment    Has  the patient had two or more falls in the last year or any fall with injury in the last year?  No    Safety Assessment    Throw rugs on floor.  Yes  Handrails on all stairs.  Yes  Good lighting in all hallways.  Yes  Difficulty hearing.  Yes  Patient counseled about all safety risks that were identified.    Functional Assessment ADLs    Are there any barriers preventing you from cooking for yourself or meeting nutritional needs?  No.    Are there any barriers preventing you from driving safely or obtaining transportation?  No.    Are there any barriers preventing you from using a telephone or calling for help?  No.    Are there any barriers preventing you from shopping?  No.    Are there any barriers preventing you from taking care of your own finances?  No.    Are there any barriers preventing you from managing your medications?  No.    Are there any barriers preventing you from showering, bathing or dressing yourself?  No.    Are you currently engaging in any exercise or physical activity?  No.     What is your perception of your health?  Excellent.      Health Maintenance Summary                IMM PNEUMOCOCCAL VACCINE: 65+ Years Overdue 6/3/2017      Done 6/3/2016 Imm Admin: Pneumococcal Conjugate Vaccine (Prevnar/PCV-13)     Patient has more history with this topic...    Annual Wellness Visit Overdue 5/7/2020      Done 5/7/2019 Visit Dx: Medicare annual wellness visit, subsequent     Patient has more history with this topic...    IMM INFLUENZA Next Due 9/1/2020      Done 10/23/2019 Pat Imm: Influenza, Unspecified - Historical Data     Patient has more history with this topic...    COLONOSCOPY Next Due 7/16/2021      Done 7/16/2018 REFERRAL TO GI FOR COLONOSCOPY     Patient has more history with this topic...    IMM DTaP/Tdap/Td Vaccine Next Due 5/9/2023      Done 5/9/2013 Imm Admin: Tdap Vaccine          Patient Care Team:  Brayden Dobbins M.D. as PCP - General  Alexandro Ortez M.D. as Consulting Physician  (Gastroenterology)  JORDYN Tracey as Consulting Physician (Pulmonary Medicine)  Jake Nolasco M.D. as Consulting Physician (Internal Medicine)  Mookie Jack M.D. as Consulting Physician (Phys Med and Rehab)  Stephane Tyler Ass't as              Health Care Screening: Age-appropriate preventive services that Medicare covers were discussed today and ordered as indicated and agreed upon by the patient, and as recommended by USPTF and ACIP.     Patient Active Problem List    Diagnosis Date Noted   • Elevated PSA 05/07/2019   • Severe episode of recurrent major depressive disorder, with psychotic features (HCC) 05/07/2018   • PTSD (post-traumatic stress disorder) 05/07/2018   • Benign prostatic hyperplasia with lower urinary tract symptoms 11/06/2017   • ALFRED (obstructive sleep apnea) 08/19/2016   • RLS (restless legs syndrome) 06/01/2015   • History of GI bleed 05/09/2013   • Colon polyp 05/02/2011   • Dyslipidemia 05/02/2011   • Essential hypertension 01/12/2010       Current Outpatient Medications   Medication Sig Dispense Refill   • hydroCHLOROthiazide (HYDRODIURIL) 12.5 MG tablet Take 1 Tab by mouth every day. 100 Tab 3   • amLODIPine (NORVASC) 10 MG Tab Take 1 Tab by mouth every day. 100 Tab 3   • benazepril (LOTENSIN) 40 MG tablet Take 1 Tab by mouth every day. 100 Tab 3   • atorvastatin (LIPITOR) 10 MG Tab Take 1 Tab by mouth every day. 100 Tab 3   • multivitamin (THERAGRAN) TABS Take 1 Tab by mouth every morning.       No current facility-administered medications for this visit.        Family History   Problem Relation Age of Onset   • Non-contributory Mother         unknown   • Heart Disease Father    • Arthritis Neg Hx    • Lung Disease Neg Hx    • Genetic Disorder Neg Hx    • Cancer Neg Hx    • Psychiatric Illness Neg Hx    • Diabetes Neg Hx    • Hypertension Neg Hx    • Hyperlipidemia Neg Hx    • Stroke Neg Hx    • Alcohol/Drug Neg Hx         Social History     Socioeconomic History   • Marital status:      Spouse name: Not on file   • Number of children: Not on file   • Years of education: Not on file   • Highest education level: Not on file   Occupational History   • Not on file   Social Needs   • Financial resource strain: Not on file   • Food insecurity     Worry: Not on file     Inability: Not on file   • Transportation needs     Medical: Not on file     Non-medical: Not on file   Tobacco Use   • Smoking status: Former Smoker     Last attempt to quit: 1986     Years since quittin.5   • Smokeless tobacco: Never Used   Substance and Sexual Activity   • Alcohol use: Yes     Alcohol/week: 0.0 oz     Comment: rarely   • Drug use: No   • Sexual activity: Not Currently   Lifestyle   • Physical activity     Days per week: Not on file     Minutes per session: Not on file   • Stress: Not on file   Relationships   • Social connections     Talks on phone: Not on file     Gets together: Not on file     Attends Baptism service: Not on file     Active member of club or organization: Not on file     Attends meetings of clubs or organizations: Not on file     Relationship status: Not on file   • Intimate partner violence     Fear of current or ex partner: Not on file     Emotionally abused: Not on file     Physically abused: Not on file     Forced sexual activity: Not on file   Other Topics Concern   • Not on file   Social History Narrative   • Not on file       Past Surgical History:   Procedure Laterality Date   • GASTROSCOPY-ENDO  3/14/2012    Performed by MELANIA OROZCO at ENDOSCOPY [facility] ORS   • GASTROSCOPY-ENDO  3/13/2012    Performed by KARISHMA NEWMAN at ENDOSCOPY [facility] ORS   • TRANS URETHRAL RESECTION PROSTATE  3/19/2009    Performed by SAI AKERS at SURGERY [facility] ORS   • OTHER      PROSTATE SURGERY   • TONSILLECTOMY         Allergies as of 2020 - Reviewed 2020   Allergen Reaction Noted  "  • Pcn [penicillins]  10/16/2009        ROS: Denies Chest pain, SOB, LE edema.    /74 (BP Location: Right arm, Patient Position: Sitting)   Pulse 90   Temp 36.6 °C (97.8 °F)   Ht 1.803 m (5' 11\")   Wt 85.3 kg (188 lb)   SpO2 95%   BMI 26.22 kg/m²     Physical Exam:  Gen:         Alert and oriented, No apparent distress.  Neck:        No Lymphadenopathy or Bruits.  Lungs:     Clear to auscultation bilaterally  CV:          Regular rate and rhythm. No murmurs, rubs or gallops.  Abd:         Soft non tender, non distended. Normal active bowel sounds.  No  Hepatosplenomegaly, No pulsatile masses.                   Ext:          No clubbing, cyanosis, edema.      Assessment and Plan.   74 y.o. male with the following issues.    1. Medicare annual wellness visit, subsequent  Discussed healthy lifestyle habits as well as screening regimens.  Discussion about safe lifestyle practices, seatbelts, sunscreen, dietary recommendations.  - Subsequent Annual Wellness Visit - Includes PPPS ()    2. Essential hypertension  Currently well controlled, Discuss diet, exercise and salt restriction.  No change to medication therapy.  - hydroCHLOROthiazide (HYDRODIURIL) 12.5 MG tablet; Take 1 Tab by mouth every day.  Dispense: 100 Tab; Refill: 3  - amLODIPine (NORVASC) 10 MG Tab; Take 1 Tab by mouth every day.  Dispense: 100 Tab; Refill: 3    3. Dyslipidemia  Recheck cholesterol refilled cholesterol medication.  - Comp Metabolic Panel; Future  - Lipid Profile; Future    4. Severe episode of recurrent major depressive disorder, with psychotic features (HCC)  [facility]  - Subsequent Annual Wellness Visit - Includes PPPS ()    5. ALFRED (obstructive sleep apnea)  Pliant with CPAP  - Subsequent Annual Wellness Visit - Includes PPPS ()    6. Elevated PSA  Reports compliant with urology follow-up  - Subsequent Annual Wellness Visit - Includes PPPS ()    7. RLS (restless legs " syndrome)  Denying any advancing symptoms  - Subsequent Annual Wellness Visit - Includes PPPS ()    8. PTSD (post-traumatic stress disorder)  Likely doing well.  - Subsequent Annual Wellness Visit - Includes PPPS ()        Referrals offered: Community-based lifestyle interventions to reduce health risks and promote self-management and wellness, fall prevention, nutrition, physical activity, tobacco-use cessation, weight loss, and mental health services as per orders if indicated.    Discussion today about general wellness and lifestyle habits:    · Prevent falls and reduce trip hazards; Cautioned about securing or removing rugs.  · Have a working fire alarm and carbon monoxide detector;   · Engage in regular physical activity and social activities

## 2020-07-28 ENCOUNTER — HOSPITAL ENCOUNTER (OUTPATIENT)
Dept: LAB | Facility: MEDICAL CENTER | Age: 75
End: 2020-07-28
Attending: INTERNAL MEDICINE
Payer: MEDICARE

## 2020-07-28 DIAGNOSIS — E78.5 DYSLIPIDEMIA: ICD-10-CM

## 2020-07-28 LAB
ALBUMIN SERPL BCP-MCNC: 4.6 G/DL (ref 3.2–4.9)
ALBUMIN/GLOB SERPL: 1.9 G/DL
ALP SERPL-CCNC: 71 U/L (ref 30–99)
ALT SERPL-CCNC: 21 U/L (ref 2–50)
ANION GAP SERPL CALC-SCNC: 15 MMOL/L (ref 7–16)
AST SERPL-CCNC: 18 U/L (ref 12–45)
BILIRUB SERPL-MCNC: 1 MG/DL (ref 0.1–1.5)
BUN SERPL-MCNC: 23 MG/DL (ref 8–22)
CALCIUM SERPL-MCNC: 9.4 MG/DL (ref 8.5–10.5)
CHLORIDE SERPL-SCNC: 103 MMOL/L (ref 96–112)
CHOLEST SERPL-MCNC: 129 MG/DL (ref 100–199)
CO2 SERPL-SCNC: 25 MMOL/L (ref 20–33)
CREAT SERPL-MCNC: 1.1 MG/DL (ref 0.5–1.4)
FASTING STATUS PATIENT QL REPORTED: NORMAL
GLOBULIN SER CALC-MCNC: 2.4 G/DL (ref 1.9–3.5)
GLUCOSE SERPL-MCNC: 91 MG/DL (ref 65–99)
HDLC SERPL-MCNC: 30 MG/DL
LDLC SERPL CALC-MCNC: 51 MG/DL
POTASSIUM SERPL-SCNC: 3.9 MMOL/L (ref 3.6–5.5)
PROT SERPL-MCNC: 7 G/DL (ref 6–8.2)
SODIUM SERPL-SCNC: 143 MMOL/L (ref 135–145)
TRIGL SERPL-MCNC: 240 MG/DL (ref 0–149)

## 2020-07-28 PROCEDURE — 36415 COLL VENOUS BLD VENIPUNCTURE: CPT

## 2020-07-28 PROCEDURE — 80053 COMPREHEN METABOLIC PANEL: CPT

## 2020-07-28 PROCEDURE — 80061 LIPID PANEL: CPT

## 2021-01-11 DIAGNOSIS — Z23 NEED FOR VACCINATION: ICD-10-CM

## 2021-07-21 ENCOUNTER — PATIENT MESSAGE (OUTPATIENT)
Dept: HEALTH INFORMATION MANAGEMENT | Facility: OTHER | Age: 76
End: 2021-07-21

## 2021-08-26 NOTE — PROGRESS NOTES
"CC: Follow-up hypertension    HPI:   Herber presents today with the following.    1. Essential hypertension  Presents reporting things been doing very well over the last 6 months.  Blood pressure well controlled denying any chest pain or shortness of breath no other complaints today.  He does have need for refill of amlodipine.      Patient Active Problem List    Diagnosis Date Noted   • Severe episode of recurrent major depressive disorder, with psychotic features (HCC) 05/07/2018   • PTSD (post-traumatic stress disorder) 05/07/2018   • Benign prostatic hyperplasia with lower urinary tract symptoms 11/06/2017   • ALFRED (obstructive sleep apnea) 08/19/2016   • Primary insomnia 08/19/2016   • RLS (restless legs syndrome) 06/01/2015   • History of GI bleed 05/09/2013   • Colon polyp 05/02/2011   • Dyslipidemia 05/02/2011   • Essential hypertension 01/12/2010       Current Outpatient Prescriptions   Medication Sig Dispense Refill   • amLODIPine (NORVASC) 10 MG Tab Take 1 Tab by mouth every day. 90 Tab 3   • hydroCHLOROthiazide (HYDRODIURIL) 12.5 MG tablet TAKE ONE TABLET BY MOUTH ONCE DAILY 90 Tab 3   • atorvastatin (LIPITOR) 10 MG Tab Take 1 Tab by mouth every day. 90 Tab 3   • mirtazapine (REMERON) 30 MG Tab tablet Take 1 Tab by mouth every bedtime. 30 Tab 11   • benazepril (LOTENSIN) 40 MG tablet TAKE ONE TABLET BY MOUTH ONCE DAILY 90 Tab 3   • multivitamin (THERAGRAN) TABS Take 1 Tab by mouth every morning.       No current facility-administered medications for this visit.          Allergies as of 11/07/2018 - Reviewed 11/07/2018   Allergen Reaction Noted   • Pcn [penicillins]  10/16/2009        ROS: Denies Chest pain, SOB, LE edema.    /82 (BP Location: Left arm)   Pulse 83   Temp 37.1 °C (98.7 °F)   Resp 16   Ht 1.778 m (5' 10\")   Wt 83.5 kg (184 lb)   SpO2 95%   BMI 26.40 kg/m²     Physical Exam:  Gen:         Alert and oriented, No apparent distress.  Neck:        No Lymphadenopathy or " Spoke with Kelsey & informed her of MD's recommendations below. Kelsey requests we place a referral to \"wherever,\" because she has not seen any doctor's before.    Writer placed referral to Orthopedics. Kelsey is aware & agrees.     Nothing further needed.   Bruits.  Lungs:     Clear to auscultation bilaterally  CV:          Regular rate and rhythm. No murmurs, rubs or gallops.               Ext:          No clubbing, cyanosis, edema.      Assessment and Plan.   72 y.o. male with the following issues.    1. Essential hypertension  Currently well controlled, Discuss diet, exercise and salt restriction.  No change to medication therapy.  Refilled medication  - amLODIPine (NORVASC) 10 MG Tab; Take 1 Tab by mouth every day.  Dispense: 90 Tab; Refill: 3

## 2021-11-27 ENCOUNTER — OFFICE VISIT (OUTPATIENT)
Dept: URGENT CARE | Facility: PHYSICIAN GROUP | Age: 76
End: 2021-11-27

## 2021-11-27 ENCOUNTER — OFFICE VISIT (OUTPATIENT)
Dept: URGENT CARE | Facility: PHYSICIAN GROUP | Age: 76
End: 2021-11-27
Payer: MEDICARE

## 2021-11-27 VITALS
BODY MASS INDEX: 26.34 KG/M2 | WEIGHT: 184 LBS | DIASTOLIC BLOOD PRESSURE: 60 MMHG | SYSTOLIC BLOOD PRESSURE: 108 MMHG | HEIGHT: 70 IN | TEMPERATURE: 98.1 F | HEART RATE: 78 BPM | RESPIRATION RATE: 16 BRPM | OXYGEN SATURATION: 97 %

## 2021-11-27 VITALS
SYSTOLIC BLOOD PRESSURE: 108 MMHG | WEIGHT: 184 LBS | HEIGHT: 70 IN | TEMPERATURE: 98.1 F | DIASTOLIC BLOOD PRESSURE: 60 MMHG | HEART RATE: 78 BPM | RESPIRATION RATE: 16 BRPM | BODY MASS INDEX: 26.34 KG/M2 | OXYGEN SATURATION: 97 %

## 2021-11-27 DIAGNOSIS — I10 ESSENTIAL HYPERTENSION: ICD-10-CM

## 2021-11-27 DIAGNOSIS — C67.5 MALIGNANT NEOPLASM OF URINARY BLADDER NECK (HCC): ICD-10-CM

## 2021-11-27 DIAGNOSIS — E11.9 TYPE 2 DIABETES MELLITUS WITHOUT COMPLICATION, WITH LONG-TERM CURRENT USE OF INSULIN (HCC): ICD-10-CM

## 2021-11-27 DIAGNOSIS — E11.9 TYPE 2 DIABETES MELLITUS WITHOUT COMPLICATION, WITHOUT LONG-TERM CURRENT USE OF INSULIN (HCC): ICD-10-CM

## 2021-11-27 DIAGNOSIS — K04.7 DENTAL ABSCESS: ICD-10-CM

## 2021-11-27 DIAGNOSIS — Z79.4 TYPE 2 DIABETES MELLITUS WITHOUT COMPLICATION, WITH LONG-TERM CURRENT USE OF INSULIN (HCC): ICD-10-CM

## 2021-11-27 DIAGNOSIS — Z96.22 RETAINED BILATERAL MYRINGOTOMY TUBES: ICD-10-CM

## 2021-11-27 DIAGNOSIS — E03.9 HYPOTHYROIDISM, UNSPECIFIED TYPE: ICD-10-CM

## 2021-11-27 DIAGNOSIS — K08.89 TOOTH PAIN: ICD-10-CM

## 2021-11-27 PROCEDURE — 99999 PR NO CHARGE: CPT | Performed by: FAMILY MEDICINE

## 2021-11-27 PROCEDURE — 99214 OFFICE O/P EST MOD 30 MIN: CPT | Performed by: FAMILY MEDICINE

## 2021-11-27 RX ORDER — CLINDAMYCIN HYDROCHLORIDE 300 MG/1
300 CAPSULE ORAL 3 TIMES DAILY
Qty: 15 CAPSULE | Refills: 0 | Status: SHIPPED | OUTPATIENT
Start: 2021-11-27 | End: 2021-11-27

## 2021-11-27 RX ORDER — BENAZEPRIL HYDROCHLORIDE 20 MG/1
40 TABLET ORAL DAILY
COMMUNITY
End: 2021-11-27

## 2021-11-27 RX ORDER — GLIMEPIRIDE 4 MG/1
4 TABLET ORAL EVERY MORNING
Qty: 30 TABLET | Refills: 1 | Status: SHIPPED | OUTPATIENT
Start: 2021-11-27 | End: 2021-11-29

## 2021-11-27 RX ORDER — HYDROMORPHONE HYDROCHLORIDE 4 MG/1
TABLET ORAL
COMMUNITY
Start: 2021-11-07 | End: 2021-11-29

## 2021-11-27 RX ORDER — CLINDAMYCIN HYDROCHLORIDE 300 MG/1
300 CAPSULE ORAL 3 TIMES DAILY
Qty: 21 CAPSULE | Refills: 0 | Status: SHIPPED | OUTPATIENT
Start: 2021-11-27 | End: 2021-11-27

## 2021-11-27 RX ORDER — PREGABALIN 100 MG/1
100 CAPSULE ORAL 2 TIMES DAILY
Qty: 60 CAPSULE | Refills: 1 | Status: SHIPPED | OUTPATIENT
Start: 2021-11-27 | End: 2021-11-29

## 2021-11-27 RX ORDER — LEVOTHYROXINE SODIUM 175 UG/1
175 TABLET ORAL
Qty: 30 TABLET | Refills: 1 | Status: SHIPPED | OUTPATIENT
Start: 2021-11-27 | End: 2021-11-29

## 2021-11-27 RX ORDER — CHOLECALCIFEROL (VITAMIN D3) 25 MCG
1 TABLET,CHEWABLE ORAL DAILY
Qty: 30 CAPSULE | Refills: 1 | Status: SHIPPED | OUTPATIENT
Start: 2021-11-27 | End: 2021-11-29

## 2021-11-27 RX ORDER — TAMSULOSIN HYDROCHLORIDE 0.4 MG/1
0.8 CAPSULE ORAL DAILY
Qty: 30 CAPSULE | Refills: 1 | Status: SHIPPED | OUTPATIENT
Start: 2021-11-27 | End: 2021-11-29

## 2021-11-27 RX ORDER — CLINDAMYCIN HYDROCHLORIDE 300 MG/1
300 CAPSULE ORAL 3 TIMES DAILY
Qty: 15 CAPSULE | Refills: 0 | Status: SHIPPED | OUTPATIENT
Start: 2021-11-27 | End: 2021-12-02

## 2021-11-27 RX ORDER — ALOGLIPTIN 25 MG/1
1 TABLET, FILM COATED ORAL DAILY
Qty: 30 TABLET | Refills: 1 | Status: SHIPPED | OUTPATIENT
Start: 2021-11-27 | End: 2021-11-29

## 2021-11-27 RX ORDER — LISINOPRIL 40 MG/1
40 TABLET ORAL DAILY
Qty: 30 TABLET | Refills: 1 | Status: SHIPPED | OUTPATIENT
Start: 2021-11-27 | End: 2021-11-29

## 2021-11-27 RX ORDER — LORATADINE 10 MG/1
10 TABLET ORAL DAILY
Qty: 30 TABLET | Refills: 1 | Status: SHIPPED | OUTPATIENT
Start: 2021-11-27 | End: 2021-11-29

## 2021-11-27 ASSESSMENT — ENCOUNTER SYMPTOMS
MYALGIAS: 0
MYALGIAS: 0
CHILLS: 0
CHILLS: 0
FEVER: 0
SORE THROAT: 0
DIZZINESS: 0
NAUSEA: 0
VOMITING: 0
COUGH: 0
VOMITING: 0
DIZZINESS: 0
SORE THROAT: 0
FEVER: 0
NAUSEA: 0
COUGH: 0
SHORTNESS OF BREATH: 0
SHORTNESS OF BREATH: 0

## 2021-11-27 NOTE — PROGRESS NOTES
Subjective:   Herber Hamilton is a 76 y.o. male who presents for Oral Swelling (Tooth abcess)        Dental Injury  This is a new problem. The current episode started in the past 7 days (3 days). The problem occurs constantly. The problem has been gradually worsening. Pertinent negatives include no chills, coughing, fever, myalgias, nausea, rash, sore throat or vomiting. Associated symptoms comments: Schedule for appointment with dentist on 11/29/2021    Denies recollection of specific dental injury. He has tried rest for the symptoms. The treatment provided no relief.     PMH:  has a past medical history of Depression (1/12/2010), Dyslipidemia, HTN (hypertension) (1/12/2010), Hypertension, Sleep apnea, and UTI (urinary tract infection).  MEDS:   Current Outpatient Medications:   •  clindamycin (CLEOCIN) 300 MG Cap, Take 1 Capsule by mouth 3 times a day for 5 days., Disp: 15 Capsule, Rfl: 0  •  hydroCHLOROthiazide (HYDRODIURIL) 12.5 MG tablet, Take 1 Tab by mouth every day., Disp: 100 Tab, Rfl: 3  •  amLODIPine (NORVASC) 10 MG Tab, Take 1 Tab by mouth every day., Disp: 100 Tab, Rfl: 3  •  benazepril (LOTENSIN) 40 MG tablet, Take 1 Tab by mouth every day., Disp: 100 Tab, Rfl: 3  •  atorvastatin (LIPITOR) 10 MG Tab, Take 1 Tab by mouth every day., Disp: 100 Tab, Rfl: 3  •  multivitamin (THERAGRAN) TABS, Take 1 Tab by mouth every morning., Disp: , Rfl:   ALLERGIES:   Allergies   Allergen Reactions   • Pcn [Penicillins]      SURGHX:   Past Surgical History:   Procedure Laterality Date   • GASTROSCOPY-ENDO  3/14/2012    Performed by MELANIA OROZCO at ENDOSCOPY [facility] ORS   • GASTROSCOPY-ENDO  3/13/2012    Performed by KARISHMA NEWMAN at ENDOSCOPY [facility] ORS   • TRANS URETHRAL RESECTION PROSTATE  3/19/2009    Performed by SAI AKERS at SURGERY [facility] ORS   • OTHER      PROSTATE SURGERY   • TONSILLECTOMY       SOCHX:  reports that he quit smoking about 35 years ago. He has never used smokeless  "tobacco. He reports current alcohol use. He reports that he does not use drugs.  FH:   Family History   Problem Relation Age of Onset   • Non-contributory Mother         unknown   • Heart Disease Father    • Arthritis Neg Hx    • Lung Disease Neg Hx    • Genetic Disorder Neg Hx    • Cancer Neg Hx    • Psychiatric Illness Neg Hx    • Diabetes Neg Hx    • Hypertension Neg Hx    • Hyperlipidemia Neg Hx    • Stroke Neg Hx    • Alcohol/Drug Neg Hx      Review of Systems   Constitutional: Negative for chills and fever.   HENT: Negative for sore throat.    Respiratory: Negative for cough and shortness of breath.    Gastrointestinal: Negative for nausea and vomiting.   Musculoskeletal: Negative for myalgias.   Skin: Negative for rash.   Neurological: Negative for dizziness.        Objective:   /60   Pulse 78   Temp 36.7 °C (98.1 °F) (Temporal)   Resp 16   Ht 1.778 m (5' 10\")   Wt 83.5 kg (184 lb)   SpO2 97%   BMI 26.40 kg/m²   Physical Exam  Vitals and nursing note reviewed.   Constitutional:       General: He is not in acute distress.     Appearance: He is well-developed.   HENT:      Head: Normocephalic and atraumatic.      Right Ear: External ear normal.      Left Ear: External ear normal.      Nose: Nose normal.      Mouth/Throat:      Mouth: Mucous membranes are moist.      Dentition: Abnormal dentition. Gingival swelling present.     Eyes:      Conjunctiva/sclera: Conjunctivae normal.   Cardiovascular:      Rate and Rhythm: Normal rate.   Pulmonary:      Effort: Pulmonary effort is normal. No respiratory distress.      Breath sounds: Normal breath sounds.   Abdominal:      General: There is no distension.   Musculoskeletal:         General: Normal range of motion.   Skin:     General: Skin is warm and dry.   Neurological:      General: No focal deficit present.      Mental Status: He is alert and oriented to person, place, and time. Mental status is at baseline.      Gait: Gait (gait at baseline) normal. "   Psychiatric:         Judgment: Judgment normal.           Assessment/Plan:   1. Dental abscess  - clindamycin (CLEOCIN) 300 MG Cap; Take 1 Capsule by mouth 3 times a day for 5 days.  Dispense: 15 Capsule; Refill: 0    2. Tooth pain  - clindamycin (CLEOCIN) 300 MG Cap; Take 1 Capsule by mouth 3 times a day for 5 days.  Dispense: 15 Capsule; Refill: 0        Medical Decision Making/Course:  In the course of preparing for this visit with review of the pertinent past medical history, recent and past clinic visits, current medications, and performing chart, immunization, medical history and medication reconciliation, and in the further course of obtaining the current history pertinent to the clinic visit today, performing an exam and evaluation, ordering and independently evaluating labs, tests, and/or procedures, prescribing any recommended new medications as noted above, providing any pertinent counseling and education and recommending further coordination of care including recommendations for follow-up with dentist/oral surgery for definitive management, at least  20 minutes of total time were spent during this encounter.      Discussed close monitoring, return precautions, and supportive measures of maintaining adequate fluid hydration and caloric intake, relative rest and symptom management as needed for pain and/or fever.    Differential diagnosis, natural history, supportive care, and indications for immediate follow-up discussed.     Advised the patient to follow-up with the primary care physician for recheck, reevaluation, and consideration of further management.    Please note that this dictation was created using voice recognition software. I have worked with consultants from the vendor as well as technical experts from [facility] to optimize the interface. I have made every reasonable attempt to correct obvious errors, but I expect that there are errors of grammar and possibly content that I did not  discover before finalizing the note.

## 2021-11-27 NOTE — PROGRESS NOTES
This version of the note has been redacted during the course of a chart correction case. If you need access to the original text of this version of the note, please contact the Health Information Management department at [phone].
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
## 2021-11-27 NOTE — PROGRESS NOTES
"Subjective:   Herber Hamilton is a 76 y.o. male who presents for Oral Swelling (Tooth abcess)        Dental Injury      PMH:  has a past medical history of Depression (1/12/2010), Dyslipidemia, HTN (hypertension) (1/12/2010), Hypertension, Sleep apnea, and UTI (urinary tract infection).  MEDS:   Current Outpatient Medications:   •  hydroCHLOROthiazide (HYDRODIURIL) 12.5 MG tablet, Take 1 Tab by mouth every day., Disp: 100 Tab, Rfl: 3  •  amLODIPine (NORVASC) 10 MG Tab, Take 1 Tab by mouth every day., Disp: 100 Tab, Rfl: 3  •  benazepril (LOTENSIN) 40 MG tablet, Take 1 Tab by mouth every day., Disp: 100 Tab, Rfl: 3  •  atorvastatin (LIPITOR) 10 MG Tab, Take 1 Tab by mouth every day., Disp: 100 Tab, Rfl: 3  •  multivitamin (THERAGRAN) TABS, Take 1 Tab by mouth every morning., Disp: , Rfl:   ALLERGIES:   Allergies   Allergen Reactions   • Pcn [Penicillins]      SURGHX:   Past Surgical History:   Procedure Laterality Date   • GASTROSCOPY-ENDO  3/14/2012    Performed by MELANIA OROZCO at ENDOSCOPY [facility] ORS   • GASTROSCOPY-ENDO  3/13/2012    Performed by KARISHMA NEWMAN at ENDOSCOPY [facility] ORS   • TRANS URETHRAL RESECTION PROSTATE  3/19/2009    Performed by SAI AKERS at SURGERY [facility] ORS   • OTHER      PROSTATE SURGERY   • TONSILLECTOMY       SOCHX:  reports that he quit smoking about 35 years ago. He has never used smokeless tobacco. He reports current alcohol use. He reports that he does not use drugs.  FH:   Family History   Problem Relation Age of Onset   • Non-contributory Mother         unknown   • Heart Disease Father    • Arthritis Neg Hx    • Lung Disease Neg Hx    • Genetic Disorder Neg Hx    • Cancer Neg Hx    • Psychiatric Illness Neg Hx    • Diabetes Neg Hx    • Hypertension Neg Hx    • Hyperlipidemia Neg Hx    • Stroke Neg Hx    • Alcohol/Drug Neg Hx      ROS     Objective:   /60   Pulse 78   Temp 36.7 °C (98.1 °F) (Temporal)   Resp 16   Ht 1.778 m (5' 10\")   Wt " 83.5 kg (184 lb)   SpO2 97%   BMI 26.40 kg/m²   Physical Exam      Assessment/Plan:   There are no diagnoses linked to this encounter.***      Medical Decision Making/Course:  In the course of preparing for this visit with review of the pertinent past medical history, recent and past clinic visits, current medications, and performing chart, immunization, medical history and medication reconciliation, and in the further course of obtaining the current history pertinent to the clinic visit today, performing an exam and evaluation, ordering and independently evaluating labs, tests, and/or procedures, prescribing any recommended new medications as noted above, providing any pertinent counseling and education and recommending further coordination of care, at least  *** minutes of total time were spent during this encounter.      Discussed close monitoring, return precautions, and supportive measures of maintaining adequate fluid hydration and caloric intake, relative rest and symptom management as needed for pain and/or fever.    Differential diagnosis, natural history, supportive care, and indications for immediate follow-up discussed.     Advised the patient to follow-up with the primary care physician for recheck, reevaluation, and consideration of further management.    Please note that this dictation was created using voice recognition software. I have worked with consultants from the vendor as well as technical experts from [employer] [facility] [employer] to optimize the interface. I have made every reasonable attempt to correct obvious errors, but I expect that there are errors of grammar and possibly content that I did not discover before finalizing the note.

## 2022-03-10 ENCOUNTER — OFFICE VISIT (OUTPATIENT)
Dept: DERMATOLOGY | Facility: IMAGING CENTER | Age: 77
End: 2022-03-10
Payer: MEDICARE

## 2022-03-10 DIAGNOSIS — L73.8 SEBACEOUS HYPERPLASIA: ICD-10-CM

## 2022-03-10 DIAGNOSIS — D18.01 CHERRY ANGIOMA: ICD-10-CM

## 2022-03-10 DIAGNOSIS — L81.4 LENTIGINES: ICD-10-CM

## 2022-03-10 DIAGNOSIS — L82.1 SK (SEBORRHEIC KERATOSIS): ICD-10-CM

## 2022-03-10 DIAGNOSIS — D22.9 MULTIPLE NEVI: ICD-10-CM

## 2022-03-10 DIAGNOSIS — D48.9 NEOPLASM OF UNCERTAIN BEHAVIOR: ICD-10-CM

## 2022-03-10 DIAGNOSIS — L57.0 ACTINIC KERATOSIS: ICD-10-CM

## 2022-03-10 DIAGNOSIS — Z85.828 HISTORY OF BASAL CELL CARCINOMA (BCC): ICD-10-CM

## 2022-03-10 DIAGNOSIS — Z12.83 SKIN CANCER SCREENING: ICD-10-CM

## 2022-03-10 PROCEDURE — 99213 OFFICE O/P EST LOW 20 MIN: CPT | Mod: 25 | Performed by: NURSE PRACTITIONER

## 2022-03-10 PROCEDURE — 17000 DESTRUCT PREMALG LESION: CPT | Mod: 59 | Performed by: NURSE PRACTITIONER

## 2022-03-10 PROCEDURE — 11102 TANGNTL BX SKIN SINGLE LES: CPT | Mod: 59 | Performed by: NURSE PRACTITIONER

## 2022-03-10 PROCEDURE — 17110 DESTRUCTION B9 LES UP TO 14: CPT | Performed by: NURSE PRACTITIONER

## 2022-03-10 RX ORDER — TRAMADOL HYDROCHLORIDE 50 MG/1
50 TABLET ORAL EVERY 6 HOURS PRN
COMMUNITY
Start: 2022-01-10 | End: 2023-12-11

## 2022-03-10 NOTE — PROGRESS NOTES
DERMATOLOGY NOTE  NEW VISIT       Chief complaint: Establish Care (Anastasia )     HPI/location: lt cheek   Time present: 3 weeks   Painful lesion: Yes  Itching lesion: No  Enlarging lesion: No  Anything make it better or worse?    HPI/location: rt ear crusty spot  Time present: several mths   Painful lesion: No  Itching lesion: No  Enlarging lesion: No  Anything make it better or worse?    History of skin cancer: Yes, Details: rt cheek - BCC around 2015  History of precancers/actinic keratoses: Yes, Details: face  History of biopsies:Yes, Details: above  History of blistering/severe sunburns:No  Family history of skin cancer:No  Family history of atypical moles:No      Allergies   Allergen Reactions   • Pcn [Penicillins]         MEDICATIONS:  Medications relevant to specialty reviewed.     REVIEW OF SYSTEMS:   Positive for skin (see HPI)  Negative for fevers and chills       EXAM:  There were no vitals taken for this visit.  Constitutional: Well-developed, well-nourished, and in no distress.     A total body skin exam was performed excluding the genitals per patient preference and including the following areas: head (including face), neck, chest, abdomen, groin/buttocks, back, bilateral upper extremities, and bilateral lower extremities with the following pertinent findings listed below and/or in assessment/plan.     -1cm pink/red papule  with central ulcer @ L lateral cheek   -sun exposed skin of trunk and b/l upper and lower extremities with scattered clinically benign light brown reticulated macules all of which were morphologically similar and none of which were suspicious for skin cancer today on exam  -Several scattered 1-3mm bright red macules and thin papules on the trunk and extremities  -Several tan medium brown skin-colored stuck-on waxy papules scattered on the trunk and extremities and ear  -Multiple light brown medium brown skin-colored macules papules scattered over the trunk and extremities  -AK -Lt  forehead  - Scattered sebaceous hyperplasia on face     IMPRESSION / PLAN:    1. Neoplasm of uncertain behavior  Procedure Note   Procedure: Biopsy by shave technique  Location: L lateral cheek  Size: as noted in exam  Preoperative diagnosis: SCC vs BCC vs other  Risks, benefits and alternatives of procedure discussed, verbal consent obtained for photo (see chart) and written informed consent obtained for procedure. Time out completed. Area of biopsy prepped with alcohol. Anesthesia with 1% lidocaine with epinephrine administered with 30 gauge needle. Shave biopsy of the site performed. Hemostasis achieved with pressure and aluminum chloride. Vaseline applied to wound with bandage. Patient tolerated procedure well and there were no complications. The specimen was sent to the pathology lab by the staff. Wound care was discussed.      2. Actinic keratosis  CRYOTHERAPY:  Risks (including, but not limited to: hypo or hyperpigmentation, redness, blister, blood blister, recurrence, need for further treatment, infection, scar) and benefits of cryotherapy discussed. Patient verbally agreed to proceed with treatment. 1 cryotherapy freeze thaw cycles of 10 seconds were applied to 1 lesion on L forehead with cryac. Patient tolerated procedure well. Aftercare instructions given.      3. Multiple nevi  - Benign-appearing nature of lesions discussed. Advised to return to clinic for any new or concerning changes.  - ABCDE's of melanoma discussed      4. SK (seborrheic keratosis)  - Benign-appearing nature of lesions discussed. Advised to return to clinic for any new or concerning changes.  Due to location on L helix, it is easily traumatized and irritated, thus will  Treat as below  CRYOTHERAPY:  Risks (including, but not limited to: hypo or hyperpigmentation, redness, blister, blood blister, recurrence, need for further treatment, infection, scar) and benefits of cryotherapy discussed. Patient verbally agreed to proceed with  treatment. 2 cryotherapy freeze thaw cycles of 10 seconds were applied to 1 lesion on L helix with cryac. Patient tolerated procedure well. Aftercare instructions given.      5. Lentigines  - Benign-appearing nature of lesions discussed. Advised to return to clinic for any new or concerning changes.      6. Cherry angioma  - Benign-appearing nature of lesions discussed. Advised to return to clinic for any new or concerning changes.      7. Sebaceous hyperplasia  - Benign-appearing nature of lesions discussed. Advised to return to clinic for any new or concerning changes.      8. History of basal cell carcinoma (BCC)  - ABCDE's of melanoma/NMSC as discussed below    9. Skin cancer screening  Skin cancer education  - discussed importance of sun protective clothing, eyewear  - discussed importance of daily use of broad spectrum sunscreen with SPF 30 or greater, as well as need for reapplication ~every 2 hours when exposed to UVR  - discussed importance of regular self-exams, ideally once per month, every 12 months exams in clinic  - ABCDE's of melanoma discussed  - patient to bring any new or concerning lesions to my attention  - Patient educational handout provided and reviewed with patient        Discussed risks, benefits, alternative treatments as well as common side effects associated with prescribed treatment, Patient verbalized understanding and agrees with plan regarding the above           Please note that this dictation was created using voice recognition software. I have made every reasonable attempt to correct obvious errors, but I expect that there are errors of grammar and possibly content that I did not discover before finalizing the note.      Return to clinic in: Return in about 1 year (around 3/10/2023) for CHING, and pending biopsy results. and as needed for any new or changing skin lesions.

## 2022-03-10 NOTE — PROGRESS NOTES
DERMATOLOGY NOTE  NEW VISIT       Chief complaint: Establish Care (Anastasia )     HPI/location: lt cheek   Time present: 3 weeks   Painful lesion: Yes  Itching lesion: No  Enlarging lesion: No  Anything make it better or worse?    HPI/location: rt ear crusty spot  Time present: several mths   Painful lesion: No  Itching lesion: No  Enlarging lesion: No  Anything make it better or worse?    History of skin cancer: Yes, Details: rt cheek - BCC around 2015  History of precancers/actinic keratoses: Yes, Details: face  History of biopsies:Yes, Details: above  History of blistering/severe sunburns:No  Family history of skin cancer:No  Family history of atypical moles:No      Allergies   Allergen Reactions   • Pcn [Penicillins]         MEDICATIONS:  Medications relevant to specialty reviewed.     REVIEW OF SYSTEMS:   Positive for skin (see HPI)  Negative for fevers and chills       EXAM:  There were no vitals taken for this visit.  Constitutional: Well-developed, well-nourished, and in no distress.     A total body skin exam was performed excluding the genitals per patient preference and including the following areas: head (including face), neck, chest, abdomen, groin/buttocks, back, bilateral upper extremities, and bilateral lower extremities with the following pertinent findings listed below and/or in assessment/plan.       IMPRESSION / PLAN:    ***    Discussed risks, benefits, alternative treatments as well as common side effects associated with prescribed treatment, Patient verbalized understanding and agrees with plan regarding ***           Please note that this dictation was created using voice recognition software. I have made every reasonable attempt to correct obvious errors, but I expect that there are errors of grammar and possibly content that I did not discover before finalizing the note.      Return to clinic in: No follow-ups on file. and as needed for any new or changing skin lesions.

## 2022-03-16 ENCOUNTER — TELEPHONE (OUTPATIENT)
Dept: DERMATOLOGY | Facility: IMAGING CENTER | Age: 77
End: 2022-03-16
Payer: MEDICARE

## 2022-03-16 DIAGNOSIS — C44.320 SQUAMOUS CELL CARCINOMA, FACE: ICD-10-CM

## 2022-03-16 NOTE — TELEPHONE ENCOUNTER
Spoke with pt regarding biopsy results--SCC moderately differentiated, rec. MOHS, referral placed, please process

## 2022-03-24 NOTE — TELEPHONE ENCOUNTER
Patient notified of D- path results .  SCC left lateral cheek . [facility]  , all information given to patient . Scanned in media tab

## 2022-07-22 ENCOUNTER — OFFICE VISIT (OUTPATIENT)
Dept: URGENT CARE | Facility: PHYSICIAN GROUP | Age: 77
End: 2022-07-22
Payer: MEDICARE

## 2022-07-22 VITALS
RESPIRATION RATE: 16 BRPM | SYSTOLIC BLOOD PRESSURE: 124 MMHG | WEIGHT: 180 LBS | TEMPERATURE: 98.6 F | OXYGEN SATURATION: 93 % | HEIGHT: 71 IN | BODY MASS INDEX: 25.2 KG/M2 | DIASTOLIC BLOOD PRESSURE: 80 MMHG | HEART RATE: 73 BPM

## 2022-07-22 DIAGNOSIS — L08.9 INFECTED SKIN LESION: ICD-10-CM

## 2022-07-22 PROCEDURE — 99213 OFFICE O/P EST LOW 20 MIN: CPT

## 2022-07-22 ASSESSMENT — ENCOUNTER SYMPTOMS
MUSCULOSKELETAL NEGATIVE: 1
EYES NEGATIVE: 1
NEUROLOGICAL NEGATIVE: 1
CARDIOVASCULAR NEGATIVE: 1
CHILLS: 0
FEVER: 0
RESPIRATORY NEGATIVE: 1
GASTROINTESTINAL NEGATIVE: 1

## 2022-07-22 NOTE — PROGRESS NOTES
Subjective     Tang Hamilton is a 76 y.o. male who presents with Facial Swelling (Left side face has mole and is swelling )          HPI     Patient reports that he had Moh's surgery about a month ago. Patient reports he had basal cell CA on the left side of his face , was seen by dermatology and referred for Moh's surgery. Yesterday, he noted redness in the area of surgery. Today, he noted clear fluid leaking out of the area. He further note tenderness to the area with touch. He denies any pain. He further denies any fever or chills.     Patient's current problem list, medications, and past medical/surgical history were reviewed in Epic.    PMH:  has a past medical history of Depression (1/12/2010), Dyslipidemia, HTN (hypertension) (1/12/2010), Hypertension, Sleep apnea, and UTI (urinary tract infection).  MEDS:   Current Outpatient Medications:   •  traMADol (ULTRAM) 50 MG Tab, Take 50 mg by mouth every 6 hours as needed.  FOR PAIN, Disp: , Rfl:   •  hydroCHLOROthiazide (HYDRODIURIL) 12.5 MG tablet, Take 1 Tab by mouth every day., Disp: 100 Tab, Rfl: 3  •  amLODIPine (NORVASC) 10 MG Tab, Take 1 Tab by mouth every day., Disp: 100 Tab, Rfl: 3  •  benazepril (LOTENSIN) 40 MG tablet, Take 1 Tab by mouth every day., Disp: 100 Tab, Rfl: 3  •  atorvastatin (LIPITOR) 10 MG Tab, Take 1 Tab by mouth every day., Disp: 100 Tab, Rfl: 3  •  multivitamin (THERAGRAN) TABS, Take 1 Tab by mouth every morning., Disp: , Rfl:   ALLERGIES:   Allergies   Allergen Reactions   • Pcn [Penicillins]      SURGHX:   Past Surgical History:   Procedure Laterality Date   • GASTROSCOPY-ENDO  3/14/2012    Performed by MELANIA OROZCO at ENDOSCOPY [facility] ORS   • GASTROSCOPY-ENDO  3/13/2012    Performed by KARISHMA NEWMAN at ENDOSCOPY [facility] ORS   • TRANS URETHRAL RESECTION PROSTATE  3/19/2009    Performed by SAI AKERS at SURGERY [facility] ORS   • OTHER      PROSTATE SURGERY   • TONSILLECTOMY       SOCHX:  reports that he  "quit smoking about 36 years ago. He has never used smokeless tobacco. He reports current alcohol use. He reports that he does not use drugs.  FH: Reviewed with patient, not pertinent to this visit.       Review of Systems   Constitutional: Negative for chills and fever.   HENT: Negative.    Eyes: Negative.    Respiratory: Negative.    Cardiovascular: Negative.    Gastrointestinal: Negative.    Genitourinary: Negative.    Musculoskeletal: Negative.    Skin:        Redness on left cheek   Neurological: Negative.               Objective     /80 (BP Location: Left arm, Patient Position: Sitting, BP Cuff Size: Adult)   Pulse 73   Temp 37 °C (98.6 °F) (Temporal)   Resp 16   Ht 1.803 m (5' 11\")   Wt 81.6 kg (180 lb)   SpO2 93%   BMI 25.10 kg/m²      Physical Exam  Constitutional:       Appearance: Normal appearance.   HENT:      Head: Normocephalic.      Nose: Nose normal.   Eyes:      Extraocular Movements: Extraocular movements intact.   Cardiovascular:      Rate and Rhythm: Normal rate.      Pulses: Normal pulses.   Pulmonary:      Effort: Pulmonary effort is normal.   Musculoskeletal:         General: Normal range of motion.      Cervical back: Normal range of motion.   Skin:     General: Skin is warm.      Findings: Lesion and rash present. Rash is pustular.      Comments: Patient with papular lesion on the left cheek, erythematous, scant clear drainage; tender to palpation   Neurological:      General: No focal deficit present.      Mental Status: He is alert.   Psychiatric:         Mood and Affect: Mood normal.         Behavior: Behavior normal.       Assessment & Plan     1. Infected skin lesion    - mupirocin (BACTROBAN) 2 % Ointment; Apply 1 Application topically 2 times a day.  Dispense: 22 g; Refill: 0    Patient with skin lesion that is erythematous papular, where he previously had Mohs surgery.  He is prescribed mupirocin ointment twice daily for 5 to 7 days.  Patient is instructed to follow-up " with dermatology. Discussed treatment plan with patient, she is agreeable and verbalized understanding.  Educated patient on signs and symptoms watch out for, when to return to the clinic or go to the ER.    Electronically Signed by BUSHRA Amin

## 2022-11-11 ENCOUNTER — DOCUMENTATION (OUTPATIENT)
Dept: HEALTH INFORMATION MANAGEMENT | Facility: OTHER | Age: 77
End: 2022-11-11
Payer: MEDICARE

## 2022-11-23 ENCOUNTER — OFFICE VISIT (OUTPATIENT)
Dept: URGENT CARE | Facility: PHYSICIAN GROUP | Age: 77
End: 2022-11-23
Payer: MEDICARE

## 2022-11-23 VITALS
HEART RATE: 71 BPM | SYSTOLIC BLOOD PRESSURE: 126 MMHG | RESPIRATION RATE: 18 BRPM | TEMPERATURE: 97.9 F | WEIGHT: 184 LBS | DIASTOLIC BLOOD PRESSURE: 70 MMHG | HEIGHT: 71 IN | BODY MASS INDEX: 25.76 KG/M2 | OXYGEN SATURATION: 98 %

## 2022-11-23 DIAGNOSIS — T16.1XXA FOREIGN BODY OF RIGHT EAR, INITIAL ENCOUNTER: ICD-10-CM

## 2022-11-23 PROCEDURE — 99212 OFFICE O/P EST SF 10 MIN: CPT | Performed by: PHYSICIAN ASSISTANT

## 2022-11-23 ASSESSMENT — ENCOUNTER SYMPTOMS
DIARRHEA: 0
SORE THROAT: 0
MYALGIAS: 0
COUGH: 0
CHILLS: 0
EYE PAIN: 0
NAUSEA: 0
ABDOMINAL PAIN: 0
FEVER: 0
SHORTNESS OF BREATH: 0
VOMITING: 0
CONSTIPATION: 0
HEADACHES: 0

## 2022-11-23 NOTE — PROGRESS NOTES
"Subjective:   Herber Hamilton is a 76 y.o. male who presents for Other (hearing aid stuck in ear,right ear,x1 day)      76-year-old male got brand-new hearing aid gasket stuck in his right ear yesterday, presents for extraction.  No pain.    Review of Systems   Constitutional:  Negative for chills and fever.   HENT:  Negative for congestion, ear pain and sore throat.    Eyes:  Negative for pain.   Respiratory:  Negative for cough and shortness of breath.    Cardiovascular:  Negative for chest pain.   Gastrointestinal:  Negative for abdominal pain, constipation, diarrhea, nausea and vomiting.   Genitourinary:  Negative for dysuria.   Musculoskeletal:  Negative for myalgias.   Skin:  Negative for rash.   Neurological:  Negative for headaches.     Medications, Allergies, and current problem list reviewed today in Epic.     Objective:     /70 (BP Location: Right arm, Patient Position: Sitting, BP Cuff Size: Adult)   Pulse 71   Temp 36.6 °C (97.9 °F) (Temporal)   Resp 18   Ht 1.803 m (5' 11\")   Wt 83.5 kg (184 lb)   SpO2 98%     Physical Exam  Vitals reviewed.   Constitutional:       Appearance: Normal appearance.   HENT:      Head: Normocephalic and atraumatic.      Right Ear: External ear normal.      Left Ear: External ear normal.      Ears:      Comments: Black rubber gasket occluding right canal, after removal bilateral TMs pearly gray     Nose: Nose normal.      Mouth/Throat:      Mouth: Mucous membranes are moist.   Eyes:      Conjunctiva/sclera: Conjunctivae normal.   Cardiovascular:      Rate and Rhythm: Normal rate.   Pulmonary:      Effort: Pulmonary effort is normal.   Skin:     General: Skin is warm and dry.      Capillary Refill: Capillary refill takes less than 2 seconds.   Neurological:      Mental Status: He is alert and oriented to person, place, and time.       Assessment/Plan:     Diagnosis and associated orders:     1. Foreign body of right ear, initial encounter           Comments/MDM: "     Using alligator forceps under direct visualization from the otoscope I was able to remove the gasket without incident and return it to the patient.  No signs of infection.  No signs of perforation         Differential diagnosis, natural history, supportive care, and indications for immediate follow-up discussed.    Advised the patient to follow-up with the primary care physician for recheck, reevaluation, and consideration of further management.    Please note that this dictation was created using voice recognition software. I have made a reasonable attempt to correct obvious errors, but I expect that there are errors of grammar and possibly content that I did not discover before finalizing the note.    This note was electronically signed by Shun Shearer PA-C

## 2023-07-18 ENCOUNTER — TELEPHONE (OUTPATIENT)
Dept: HEALTH INFORMATION MANAGEMENT | Facility: OTHER | Age: 78
End: 2023-07-18
Payer: MEDICARE

## 2023-07-19 ENCOUNTER — OFFICE VISIT (OUTPATIENT)
Dept: MEDICAL GROUP | Facility: MEDICAL CENTER | Age: 78
End: 2023-07-19
Payer: MEDICARE

## 2023-07-19 VITALS
OXYGEN SATURATION: 96 % | RESPIRATION RATE: 16 BRPM | HEART RATE: 68 BPM | WEIGHT: 179 LBS | HEIGHT: 69 IN | BODY MASS INDEX: 26.51 KG/M2 | SYSTOLIC BLOOD PRESSURE: 126 MMHG | TEMPERATURE: 97.9 F | DIASTOLIC BLOOD PRESSURE: 70 MMHG

## 2023-07-19 DIAGNOSIS — Z23 NEED FOR VACCINATION: ICD-10-CM

## 2023-07-19 DIAGNOSIS — M41.9 SCOLIOSIS, UNSPECIFIED SCOLIOSIS TYPE, UNSPECIFIED SPINAL REGION: ICD-10-CM

## 2023-07-19 DIAGNOSIS — E78.5 DYSLIPIDEMIA: ICD-10-CM

## 2023-07-19 DIAGNOSIS — Z87.19 HISTORY OF GI BLEED: ICD-10-CM

## 2023-07-19 DIAGNOSIS — R97.20 ELEVATED PSA: ICD-10-CM

## 2023-07-19 DIAGNOSIS — F33.3 SEVERE EPISODE OF RECURRENT MAJOR DEPRESSIVE DISORDER, WITH PSYCHOTIC FEATURES (HCC): ICD-10-CM

## 2023-07-19 DIAGNOSIS — I10 ESSENTIAL HYPERTENSION: ICD-10-CM

## 2023-07-19 DIAGNOSIS — F43.10 POSTTRAUMATIC STRESS DISORDER: ICD-10-CM

## 2023-07-19 DIAGNOSIS — N40.1 BENIGN PROSTATIC HYPERPLASIA WITH LOWER URINARY TRACT SYMPTOMS, SYMPTOM DETAILS UNSPECIFIED: ICD-10-CM

## 2023-07-19 DIAGNOSIS — K63.5 POLYP OF COLON, UNSPECIFIED PART OF COLON, UNSPECIFIED TYPE: ICD-10-CM

## 2023-07-19 PROCEDURE — 90471 IMMUNIZATION ADMIN: CPT | Performed by: STUDENT IN AN ORGANIZED HEALTH CARE EDUCATION/TRAINING PROGRAM

## 2023-07-19 PROCEDURE — 3074F SYST BP LT 130 MM HG: CPT | Performed by: STUDENT IN AN ORGANIZED HEALTH CARE EDUCATION/TRAINING PROGRAM

## 2023-07-19 PROCEDURE — 3078F DIAST BP <80 MM HG: CPT | Performed by: STUDENT IN AN ORGANIZED HEALTH CARE EDUCATION/TRAINING PROGRAM

## 2023-07-19 PROCEDURE — 90715 TDAP VACCINE 7 YRS/> IM: CPT | Performed by: STUDENT IN AN ORGANIZED HEALTH CARE EDUCATION/TRAINING PROGRAM

## 2023-07-19 PROCEDURE — 99204 OFFICE O/P NEW MOD 45 MIN: CPT | Mod: 25 | Performed by: STUDENT IN AN ORGANIZED HEALTH CARE EDUCATION/TRAINING PROGRAM

## 2023-07-19 RX ORDER — DIVALPROEX SODIUM 500 MG/1
500 TABLET, EXTENDED RELEASE ORAL DAILY
COMMUNITY

## 2023-07-19 RX ORDER — GINSENG 100 MG
50 CAPSULE ORAL DAILY
COMMUNITY
End: 2023-12-11

## 2023-07-19 RX ORDER — POTASSIUM CHLORIDE 20 MEQ/1
10 TABLET, EXTENDED RELEASE ORAL DAILY
Status: ON HOLD | COMMUNITY
End: 2023-12-12

## 2023-07-19 RX ORDER — MIRTAZAPINE 45 MG/1
45 TABLET, FILM COATED ORAL NIGHTLY
COMMUNITY

## 2023-07-19 RX ORDER — LABETALOL 100 MG/1
100 TABLET, FILM COATED ORAL 2 TIMES DAILY
Status: ON HOLD | COMMUNITY
End: 2023-12-12

## 2023-07-19 RX ORDER — ASCORBIC ACID 500 MG
500 TABLET ORAL DAILY
COMMUNITY

## 2023-07-19 RX ORDER — OMEPRAZOLE 40 MG/1
40 CAPSULE, DELAYED RELEASE ORAL DAILY
COMMUNITY

## 2023-07-19 RX ORDER — HYDROCHLOROTHIAZIDE 12.5 MG/1
25 TABLET ORAL DAILY
Qty: 90 TABLET | Refills: 0 | Status: ON HOLD | OUTPATIENT
Start: 2023-07-19 | End: 2023-12-12

## 2023-07-19 RX ORDER — TERAZOSIN 2 MG/1
2 CAPSULE ORAL NIGHTLY
COMMUNITY

## 2023-07-19 ASSESSMENT — PATIENT HEALTH QUESTIONNAIRE - PHQ9
1. LITTLE INTEREST OR PLEASURE IN DOING THINGS: NOT AT ALL
5. POOR APPETITE OR OVEREATING: NOT AT ALL
7. TROUBLE CONCENTRATING ON THINGS, SUCH AS READING THE NEWSPAPER OR WATCHING TELEVISION: NOT AT ALL
3. TROUBLE FALLING OR STAYING ASLEEP OR SLEEPING TOO MUCH: NOT AT ALL
SUM OF ALL RESPONSES TO PHQ9 QUESTIONS 1 AND 2: 0
4. FEELING TIRED OR HAVING LITTLE ENERGY: MORE THAN HALF THE DAYS
8. MOVING OR SPEAKING SO SLOWLY THAT OTHER PEOPLE COULD HAVE NOTICED. OR THE OPPOSITE, BEING SO FIGETY OR RESTLESS THAT YOU HAVE BEEN MOVING AROUND A LOT MORE THAN USUAL: NOT AT ALL
9. THOUGHTS THAT YOU WOULD BE BETTER OFF DEAD, OR OF HURTING YOURSELF: NOT AT ALL
6. FEELING BAD ABOUT YOURSELF - OR THAT YOU ARE A FAILURE OR HAVE LET YOURSELF OR YOUR FAMILY DOWN: NOT AL ALL
2. FEELING DOWN, DEPRESSED, IRRITABLE, OR HOPELESS: NOT AT ALL
SUM OF ALL RESPONSES TO PHQ QUESTIONS 1-9: 2

## 2023-07-19 ASSESSMENT — ENCOUNTER SYMPTOMS
WHEEZING: 0
VOMITING: 0
CHILLS: 0
PALPITATIONS: 0
SHORTNESS OF BREATH: 0
NAUSEA: 0
HEADACHES: 0
FEVER: 0
WEIGHT LOSS: 0
DIZZINESS: 0

## 2023-07-19 NOTE — PROGRESS NOTES
Subjective:     CC:  Diagnoses of Essential hypertension, Polyp of colon, unspecified part of colon, unspecified type, Benign prostatic hyperplasia with lower urinary tract symptoms, symptom details unspecified, History of GI bleed, Need for vaccination, Scoliosis, unspecified scoliosis type, unspecified spinal region, Elevated PSA, Dyslipidemia, PTSD (post-traumatic stress disorder), and Severe episode of recurrent major depressive disorder, with psychotic features (HCC) were pertinent to this visit.    HISTORY OF THE PRESENT ILLNESS: Patient is a 77 y.o. male. This pleasant patient is here today to establish care and discuss     Last labs 7/28/2020 CMP renal function stable, fasting glucose 91, LDL 51, triglyceride 240    History of essential hypertension dyslipidemia, elevated BMI, LÓPEZ on CPAP, history of elevated PSA ([place] [facility] ), RLS, history of PTSD.    Macular degeneration - bilateral- [facility]  Divalproex [facility]mg [facility]  Severe scoliosis/ [facility] [place] on tramadol PRN     Problem   Scoliosis    History of scoliosis with chronic back pain currently following with [facility] [place] is prescribing tramadol 50 mg as needed.  Patient use as as needed basis.  Typically controlled pain with acetaminophen.     Elevated Psa    Reported history [facility] reports symptoms well controlled on terazosin 2 mg daily     Severe Episode of Recurrent Major Depressive Disorder, With Psychotic Features (Hcc)    Reports stable on mirtazapine 45 mg daily and Depakote  mg daily     PTSD (post-traumatic stress disorder)    Patient is on Depakote  mg daily for PTSD/irritable mood     Benign Prostatic Hyperplasia With Lower Urinary Tract Symptoms    [facility]  Symptoms well controlled on terazosin 2mg  Hx of elevated PSA, labs followed/ordered by [facility]     López On Cpap    On cpap       History of GI Bleed    Aspirin related.  On omeprazole 40mg daily      Colon  "Polyp    Report last had [facility] done in the [facility]. We do not have records. [facility] will [facility].   He reports he has a history of polyps in all his colonoscopies     Dyslipidemia    On atorvastatin 10 mg daily.  LDL from 2020 less than 70.  [facility]     Essential Hypertension    Chronic condition.  Patient is on amlodipine 10 mg, hydrochlorothiazide 25 mg, labetalol 100 mg twice daily.  Report home blood pressure typically is in 120s over 70s to 80s.  Denies prior cardiac history and stroke.  LDL < 70  Report on atorvastatin 10 mg for preventative         7/29/2023    Health Maintenance:     ROS:   Review of Systems   Constitutional:  Negative for chills, fever and weight loss.   HENT:  Negative for hearing loss.    Respiratory:  Negative for shortness of breath and wheezing.    Cardiovascular:  Negative for chest pain and palpitations.   Gastrointestinal:  Negative for nausea and vomiting.   Genitourinary:  Negative for frequency and urgency.   Skin:  Negative for rash.   Neurological:  Negative for dizziness and headaches.         Objective:       Exam: /70 (BP Location: Left arm)   Pulse 68   Temp 36.6 °C (97.9 °F)   Resp 16   Ht 1.753 m (5' 9\")   Wt 81.2 kg (179 lb)   SpO2 96%  Body mass index is 26.43 kg/m².    Physical Exam  Constitutional:       Appearance: Normal appearance.   Cardiovascular:      Rate and Rhythm: Normal rate and regular rhythm.   Pulmonary:      Effort: Pulmonary effort is normal.      Breath sounds: Normal breath sounds.   Musculoskeletal:      Cervical back: Normal range of motion and neck supple.   Lymphadenopathy:      Cervical: No cervical adenopathy.   Neurological:      Mental Status: He is alert.           Labs:     Assessment & Plan:   77 y.o. male with the following -    1. Essential hypertension  Is a chronic condition that is stable  Patient is on amlodipine 10 mg daily, hydrochlorothiazide 25 mg daily, labetalol 100 mg twice daily " taking without adverse effect good control blood pressure    2. Polyp of colon, unspecified part of colon, unspecified type  History of see HPI    3. Benign prostatic hyperplasia with lower urinary tract symptoms, symptom details unspecified  Chronic, stable  Follow-up with urology [place]  History of elevated PSA  Symptoms well controlled on terazosin 2 mg daily taking without adverse effect    4. History of GI bleed  History of,   On omeprazole 40 mg daily    5. Need for vaccination    - Tdap =>8yo IM    6. Scoliosis, unspecified scoliosis type, unspecified spinal region  History of  With related back pain use tramadol 50 mg as needed  See HPI for detail    7. Elevated PSA  History of  [facility]  For asymptomatic    8. Dyslipidemia  Chronic, stable  LDL from 2020 less than 70  [facility] labs [facility]  Taking atorvastatin 10 mg daily with good control    9. PTSD (post-traumatic stress disorder)  On Depakote  mg daily and mirtazapine 45 mg daily    10. Severe episode of recurrent major depressive disorder, with psychotic features (HCC)  Chronic condition unstable  Currently on mirtazapine 45 mg daily and Depakote 500 mg daily      HCC Gap Form    Last edited 07/19/23 11:20 PDT by Donald Pelletier M.D.           Return in about 1 year (around 7/19/2024) for chornic disease.    Please note that this dictation was created using voice recognition software. I have made every reasonable attempt to correct obvious errors, but I expect that there are errors of grammar and possibly content that I did not discover before finalizing the note.

## 2023-08-29 PROBLEM — H35.3290 EXUDATIVE AGE-RELATED MACULAR DEGENERATION (HCC): Status: ACTIVE | Noted: 2023-08-29

## 2023-08-29 PROBLEM — F11.20 NARCOTIC DEPENDENCE (HCC): Status: ACTIVE | Noted: 2023-08-29

## 2023-08-29 PROBLEM — G89.4 CHRONIC PAIN SYNDROME: Status: ACTIVE | Noted: 2023-08-21

## 2023-12-11 ENCOUNTER — HOSPITAL ENCOUNTER (INPATIENT)
Facility: MEDICAL CENTER | Age: 78
LOS: 3 days | DRG: 177 | End: 2023-12-14
Attending: EMERGENCY MEDICINE | Admitting: STUDENT IN AN ORGANIZED HEALTH CARE EDUCATION/TRAINING PROGRAM
Payer: MEDICARE

## 2023-12-11 ENCOUNTER — APPOINTMENT (OUTPATIENT)
Dept: RADIOLOGY | Facility: IMAGING CENTER | Age: 78
End: 2023-12-11
Payer: MEDICARE

## 2023-12-11 ENCOUNTER — OFFICE VISIT (OUTPATIENT)
Dept: URGENT CARE | Facility: PHYSICIAN GROUP | Age: 78
End: 2023-12-11
Payer: MEDICARE

## 2023-12-11 VITALS
HEART RATE: 56 BPM | DIASTOLIC BLOOD PRESSURE: 39 MMHG | TEMPERATURE: 97.2 F | SYSTOLIC BLOOD PRESSURE: 57 MMHG | BODY MASS INDEX: 24.91 KG/M2 | OXYGEN SATURATION: 94 % | RESPIRATION RATE: 20 BRPM | HEIGHT: 70 IN | WEIGHT: 174 LBS

## 2023-12-11 DIAGNOSIS — R53.1 WEAKNESS: ICD-10-CM

## 2023-12-11 DIAGNOSIS — U07.1 COVID-19: ICD-10-CM

## 2023-12-11 DIAGNOSIS — R09.02 HYPOXIA: ICD-10-CM

## 2023-12-11 DIAGNOSIS — R05.1 ACUTE COUGH: ICD-10-CM

## 2023-12-11 DIAGNOSIS — N17.9 AKI (ACUTE KIDNEY INJURY) (HCC): ICD-10-CM

## 2023-12-11 DIAGNOSIS — I95.9 HYPOTENSION, UNSPECIFIED HYPOTENSION TYPE: ICD-10-CM

## 2023-12-11 DIAGNOSIS — R19.7 DIARRHEA, UNSPECIFIED TYPE: ICD-10-CM

## 2023-12-11 PROBLEM — Z71.89 ACP (ADVANCE CARE PLANNING): Status: ACTIVE | Noted: 2023-12-11

## 2023-12-11 PROBLEM — J96.01 ACUTE HYPOXEMIC RESPIRATORY FAILURE (HCC): Status: ACTIVE | Noted: 2023-12-11

## 2023-12-11 LAB
ALBUMIN SERPL BCP-MCNC: 3.7 G/DL (ref 3.2–4.9)
ALBUMIN/GLOB SERPL: 1.4 G/DL
ALP SERPL-CCNC: 60 U/L (ref 30–99)
ALT SERPL-CCNC: 11 U/L (ref 2–50)
ANION GAP SERPL CALC-SCNC: 17 MMOL/L (ref 7–16)
APPEARANCE UR: CLEAR
AST SERPL-CCNC: 12 U/L (ref 12–45)
BACTERIA #/AREA URNS HPF: NEGATIVE /HPF
BASOPHILS # BLD AUTO: 0.3 % (ref 0–1.8)
BASOPHILS # BLD: 0.01 K/UL (ref 0–0.12)
BILIRUB SERPL-MCNC: 0.4 MG/DL (ref 0.1–1.5)
BILIRUB UR QL STRIP.AUTO: NEGATIVE
BUN SERPL-MCNC: 65 MG/DL (ref 8–22)
CALCIUM ALBUM COR SERPL-MCNC: 8.6 MG/DL (ref 8.5–10.5)
CALCIUM SERPL-MCNC: 8.4 MG/DL (ref 8.5–10.5)
CHLORIDE SERPL-SCNC: 98 MMOL/L (ref 96–112)
CO2 SERPL-SCNC: 22 MMOL/L (ref 20–33)
COLOR UR: ABNORMAL
CREAT SERPL-MCNC: 2.52 MG/DL (ref 0.5–1.4)
EOSINOPHIL # BLD AUTO: 0.01 K/UL (ref 0–0.51)
EOSINOPHIL NFR BLD: 0.3 % (ref 0–6.9)
EPI CELLS #/AREA URNS HPF: NEGATIVE /HPF
ERYTHROCYTE [DISTWIDTH] IN BLOOD BY AUTOMATED COUNT: 42.1 FL (ref 35.9–50)
FLUAV RNA SPEC QL NAA+PROBE: NEGATIVE
FLUAV RNA SPEC QL NAA+PROBE: NEGATIVE
FLUBV RNA SPEC QL NAA+PROBE: NEGATIVE
FLUBV RNA SPEC QL NAA+PROBE: NEGATIVE
GFR SERPLBLD CREATININE-BSD FMLA CKD-EPI: 25 ML/MIN/1.73 M 2
GLOBULIN SER CALC-MCNC: 2.7 G/DL (ref 1.9–3.5)
GLUCOSE BLD-MCNC: 121 MG/DL (ref 65–99)
GLUCOSE SERPL-MCNC: 107 MG/DL (ref 65–99)
GLUCOSE UR STRIP.AUTO-MCNC: NEGATIVE MG/DL
HCT VFR BLD AUTO: 42 % (ref 42–52)
HGB BLD-MCNC: 14.4 G/DL (ref 14–18)
HYALINE CASTS #/AREA URNS LPF: ABNORMAL /LPF
IMM GRANULOCYTES # BLD AUTO: 0.04 K/UL (ref 0–0.11)
IMM GRANULOCYTES NFR BLD AUTO: 1.1 % (ref 0–0.9)
KETONES UR STRIP.AUTO-MCNC: ABNORMAL MG/DL
LACTATE SERPL-SCNC: 1.5 MMOL/L (ref 0.5–2)
LEUKOCYTE ESTERASE UR QL STRIP.AUTO: NEGATIVE
LIPASE SERPL-CCNC: 33 U/L (ref 11–82)
LYMPHOCYTES # BLD AUTO: 0.83 K/UL (ref 1–4.8)
LYMPHOCYTES NFR BLD: 22.4 % (ref 22–41)
MCH RBC QN AUTO: 30.3 PG (ref 27–33)
MCHC RBC AUTO-ENTMCNC: 34.3 G/DL (ref 32.3–36.5)
MCV RBC AUTO: 88.4 FL (ref 81.4–97.8)
MICRO URNS: ABNORMAL
MONOCYTES # BLD AUTO: 0.73 K/UL (ref 0–0.85)
MONOCYTES NFR BLD AUTO: 19.7 % (ref 0–13.4)
NEUTROPHILS # BLD AUTO: 2.09 K/UL (ref 1.82–7.42)
NEUTROPHILS NFR BLD: 56.2 % (ref 44–72)
NITRITE UR QL STRIP.AUTO: NEGATIVE
NRBC # BLD AUTO: 0 K/UL
NRBC BLD-RTO: 0 /100 WBC (ref 0–0.2)
PH UR STRIP.AUTO: 5 [PH] (ref 5–8)
PLATELET # BLD AUTO: 136 K/UL (ref 164–446)
PMV BLD AUTO: 10.3 FL (ref 9–12.9)
POTASSIUM SERPL-SCNC: 3.7 MMOL/L (ref 3.6–5.5)
PROCALCITONIN SERPL-MCNC: 0.26 NG/ML
PROT SERPL-MCNC: 6.4 G/DL (ref 6–8.2)
PROT UR QL STRIP: 30 MG/DL
RBC # BLD AUTO: 4.75 M/UL (ref 4.7–6.1)
RBC # URNS HPF: ABNORMAL /HPF
RBC UR QL AUTO: NEGATIVE
RSV RNA SPEC QL NAA+PROBE: NEGATIVE
RSV RNA SPEC QL NAA+PROBE: NEGATIVE
SARS-COV-2 RNA RESP QL NAA+PROBE: DETECTED
SARS-COV-2 RNA RESP QL NAA+PROBE: POSITIVE
SODIUM SERPL-SCNC: 137 MMOL/L (ref 135–145)
SP GR UR STRIP.AUTO: 1.02
UROBILINOGEN UR STRIP.AUTO-MCNC: 0.2 MG/DL
WBC # BLD AUTO: 3.7 K/UL (ref 4.8–10.8)
WBC #/AREA URNS HPF: ABNORMAL /HPF

## 2023-12-11 PROCEDURE — 700111 HCHG RX REV CODE 636 W/ 250 OVERRIDE (IP): Performed by: EMERGENCY MEDICINE

## 2023-12-11 PROCEDURE — 3074F SYST BP LT 130 MM HG: CPT

## 2023-12-11 PROCEDURE — 96374 THER/PROPH/DIAG INJ IV PUSH: CPT

## 2023-12-11 PROCEDURE — 83690 ASSAY OF LIPASE: CPT

## 2023-12-11 PROCEDURE — 99223 1ST HOSP IP/OBS HIGH 75: CPT | Performed by: STUDENT IN AN ORGANIZED HEALTH CARE EDUCATION/TRAINING PROGRAM

## 2023-12-11 PROCEDURE — 71046 X-RAY EXAM CHEST 2 VIEWS: CPT | Mod: TC | Performed by: RADIOLOGY

## 2023-12-11 PROCEDURE — 770020 HCHG ROOM/CARE - TELE (206)

## 2023-12-11 PROCEDURE — 82962 GLUCOSE BLOOD TEST: CPT

## 2023-12-11 PROCEDURE — 700105 HCHG RX REV CODE 258: Performed by: STUDENT IN AN ORGANIZED HEALTH CARE EDUCATION/TRAINING PROGRAM

## 2023-12-11 PROCEDURE — 3078F DIAST BP <80 MM HG: CPT

## 2023-12-11 PROCEDURE — 36415 COLL VENOUS BLD VENIPUNCTURE: CPT

## 2023-12-11 PROCEDURE — 81001 URINALYSIS AUTO W/SCOPE: CPT

## 2023-12-11 PROCEDURE — 80053 COMPREHEN METABOLIC PANEL: CPT

## 2023-12-11 PROCEDURE — A9270 NON-COVERED ITEM OR SERVICE: HCPCS | Performed by: STUDENT IN AN ORGANIZED HEALTH CARE EDUCATION/TRAINING PROGRAM

## 2023-12-11 PROCEDURE — 0241U POCT CEPHEID COV-2, FLU A/B, RSV - PCR: CPT

## 2023-12-11 PROCEDURE — 99215 OFFICE O/P EST HI 40 MIN: CPT

## 2023-12-11 PROCEDURE — 700111 HCHG RX REV CODE 636 W/ 250 OVERRIDE (IP): Performed by: STUDENT IN AN ORGANIZED HEALTH CARE EDUCATION/TRAINING PROGRAM

## 2023-12-11 PROCEDURE — 99285 EMERGENCY DEPT VISIT HI MDM: CPT

## 2023-12-11 PROCEDURE — 93000 ELECTROCARDIOGRAM COMPLETE: CPT

## 2023-12-11 PROCEDURE — 99497 ADVNCD CARE PLAN 30 MIN: CPT | Mod: 25 | Performed by: STUDENT IN AN ORGANIZED HEALTH CARE EDUCATION/TRAINING PROGRAM

## 2023-12-11 PROCEDURE — 0241U HCHG SARS-COV-2 COVID-19 NFCT DS RESP RNA 4 TRGT ED POC: CPT

## 2023-12-11 PROCEDURE — 700105 HCHG RX REV CODE 258: Performed by: EMERGENCY MEDICINE

## 2023-12-11 PROCEDURE — 84145 PROCALCITONIN (PCT): CPT

## 2023-12-11 PROCEDURE — C9803 HOPD COVID-19 SPEC COLLECT: HCPCS

## 2023-12-11 PROCEDURE — 85025 COMPLETE CBC W/AUTO DIFF WBC: CPT

## 2023-12-11 PROCEDURE — 83605 ASSAY OF LACTIC ACID: CPT

## 2023-12-11 PROCEDURE — 700102 HCHG RX REV CODE 250 W/ 637 OVERRIDE(OP): Performed by: STUDENT IN AN ORGANIZED HEALTH CARE EDUCATION/TRAINING PROGRAM

## 2023-12-11 RX ORDER — TRAMADOL HYDROCHLORIDE 50 MG/1
50 TABLET ORAL EVERY 6 HOURS PRN
COMMUNITY

## 2023-12-11 RX ORDER — HEPARIN SODIUM 5000 [USP'U]/ML
5000 INJECTION, SOLUTION INTRAVENOUS; SUBCUTANEOUS EVERY 8 HOURS
Status: DISCONTINUED | OUTPATIENT
Start: 2023-12-11 | End: 2023-12-14 | Stop reason: HOSPADM

## 2023-12-11 RX ORDER — TRAMADOL HYDROCHLORIDE 50 MG/1
50 TABLET ORAL EVERY 12 HOURS PRN
Status: DISCONTINUED | OUTPATIENT
Start: 2023-12-11 | End: 2023-12-14 | Stop reason: HOSPADM

## 2023-12-11 RX ORDER — SODIUM CHLORIDE, SODIUM LACTATE, POTASSIUM CHLORIDE, AND CALCIUM CHLORIDE .6; .31; .03; .02 G/100ML; G/100ML; G/100ML; G/100ML
1000 INJECTION, SOLUTION INTRAVENOUS ONCE
Status: COMPLETED | OUTPATIENT
Start: 2023-12-11 | End: 2023-12-11

## 2023-12-11 RX ORDER — DEXAMETHASONE SODIUM PHOSPHATE 4 MG/ML
6 INJECTION, SOLUTION INTRA-ARTICULAR; INTRALESIONAL; INTRAMUSCULAR; INTRAVENOUS; SOFT TISSUE ONCE
Status: COMPLETED | OUTPATIENT
Start: 2023-12-11 | End: 2023-12-11

## 2023-12-11 RX ORDER — ANTIOX #8/OM3/DHA/EPA/LUT/ZEAX 250-2.5 MG
1 CAPSULE ORAL 2 TIMES DAILY
COMMUNITY

## 2023-12-11 RX ORDER — SODIUM CHLORIDE, SODIUM LACTATE, POTASSIUM CHLORIDE, CALCIUM CHLORIDE 600; 310; 30; 20 MG/100ML; MG/100ML; MG/100ML; MG/100ML
INJECTION, SOLUTION INTRAVENOUS CONTINUOUS
Status: DISCONTINUED | OUTPATIENT
Start: 2023-12-11 | End: 2023-12-12

## 2023-12-11 RX ORDER — SODIUM CHLORIDE, SODIUM LACTATE, POTASSIUM CHLORIDE, CALCIUM CHLORIDE 600; 310; 30; 20 MG/100ML; MG/100ML; MG/100ML; MG/100ML
1000 INJECTION, SOLUTION INTRAVENOUS ONCE
Status: COMPLETED | OUTPATIENT
Start: 2023-12-11 | End: 2023-12-11

## 2023-12-11 RX ORDER — ACETAMINOPHEN 325 MG/1
650 TABLET ORAL EVERY 6 HOURS PRN
Status: DISCONTINUED | OUTPATIENT
Start: 2023-12-11 | End: 2023-12-14 | Stop reason: HOSPADM

## 2023-12-11 RX ORDER — MIRTAZAPINE 15 MG/1
45 TABLET, FILM COATED ORAL NIGHTLY
Status: DISCONTINUED | OUTPATIENT
Start: 2023-12-11 | End: 2023-12-14 | Stop reason: HOSPADM

## 2023-12-11 RX ORDER — DEXAMETHASONE SODIUM PHOSPHATE 4 MG/ML
6 INJECTION, SOLUTION INTRA-ARTICULAR; INTRALESIONAL; INTRAMUSCULAR; INTRAVENOUS; SOFT TISSUE DAILY
Status: DISCONTINUED | OUTPATIENT
Start: 2023-12-12 | End: 2023-12-12

## 2023-12-11 RX ORDER — DIVALPROEX SODIUM 500 MG/1
500 TABLET, EXTENDED RELEASE ORAL DAILY
Status: DISCONTINUED | OUTPATIENT
Start: 2023-12-12 | End: 2023-12-14 | Stop reason: HOSPADM

## 2023-12-11 RX ORDER — ATORVASTATIN CALCIUM 10 MG/1
10 TABLET, FILM COATED ORAL
Status: DISCONTINUED | OUTPATIENT
Start: 2023-12-11 | End: 2023-12-14 | Stop reason: HOSPADM

## 2023-12-11 RX ADMIN — SODIUM CHLORIDE, POTASSIUM CHLORIDE, SODIUM LACTATE AND CALCIUM CHLORIDE: 600; 310; 30; 20 INJECTION, SOLUTION INTRAVENOUS at 17:43

## 2023-12-11 RX ADMIN — MIRTAZAPINE 45 MG: 15 TABLET, FILM COATED ORAL at 20:25

## 2023-12-11 RX ADMIN — HEPARIN SODIUM 5000 UNITS: 5000 INJECTION, SOLUTION INTRAVENOUS; SUBCUTANEOUS at 22:21

## 2023-12-11 RX ADMIN — SODIUM CHLORIDE, POTASSIUM CHLORIDE, SODIUM LACTATE AND CALCIUM CHLORIDE 1000 ML: 600; 310; 30; 20 INJECTION, SOLUTION INTRAVENOUS at 12:59

## 2023-12-11 RX ADMIN — HEPARIN SODIUM 5000 UNITS: 5000 INJECTION, SOLUTION INTRAVENOUS; SUBCUTANEOUS at 18:33

## 2023-12-11 RX ADMIN — ATORVASTATIN CALCIUM 10 MG: 10 TABLET, FILM COATED ORAL at 20:25

## 2023-12-11 RX ADMIN — DEXAMETHASONE SODIUM PHOSPHATE 6 MG: 4 INJECTION INTRA-ARTICULAR; INTRALESIONAL; INTRAMUSCULAR; INTRAVENOUS; SOFT TISSUE at 14:22

## 2023-12-11 RX ADMIN — SODIUM CHLORIDE, POTASSIUM CHLORIDE, SODIUM LACTATE AND CALCIUM CHLORIDE 1000 ML: 600; 310; 30; 20 INJECTION, SOLUTION INTRAVENOUS at 14:20

## 2023-12-11 ASSESSMENT — COGNITIVE AND FUNCTIONAL STATUS - GENERAL
SUGGESTED CMS G CODE MODIFIER DAILY ACTIVITY: CH
MOBILITY SCORE: 24
DAILY ACTIVITIY SCORE: 24
SUGGESTED CMS G CODE MODIFIER MOBILITY: CH

## 2023-12-11 ASSESSMENT — PATIENT HEALTH QUESTIONNAIRE - PHQ9
2. FEELING DOWN, DEPRESSED, IRRITABLE, OR HOPELESS: NOT AT ALL
SUM OF ALL RESPONSES TO PHQ9 QUESTIONS 1 AND 2: 0
1. LITTLE INTEREST OR PLEASURE IN DOING THINGS: NOT AT ALL
SUM OF ALL RESPONSES TO PHQ9 QUESTIONS 1 AND 2: 0
2. FEELING DOWN, DEPRESSED, IRRITABLE, OR HOPELESS: NOT AT ALL
1. LITTLE INTEREST OR PLEASURE IN DOING THINGS: NOT AT ALL

## 2023-12-11 ASSESSMENT — FIBROSIS 4 INDEX
FIB4 SCORE: 2.1

## 2023-12-11 ASSESSMENT — PAIN DESCRIPTION - PAIN TYPE
TYPE: ACUTE PAIN

## 2023-12-11 ASSESSMENT — ENCOUNTER SYMPTOMS
DIARRHEA: 1
PSYCHIATRIC NEGATIVE: 1
MUSCULOSKELETAL NEGATIVE: 1
CARDIOVASCULAR NEGATIVE: 1
NEUROLOGICAL NEGATIVE: 1
RESPIRATORY NEGATIVE: 1
EYES NEGATIVE: 1

## 2023-12-11 ASSESSMENT — LIFESTYLE VARIABLES
EVER HAD A DRINK FIRST THING IN THE MORNING TO STEADY YOUR NERVES TO GET RID OF A HANGOVER: NO
EVER FELT BAD OR GUILTY ABOUT YOUR DRINKING: NO
TOTAL SCORE: 0
TOTAL SCORE: 0
DOES PATIENT WANT TO STOP DRINKING: NO
ALCOHOL_USE: NO
HAVE PEOPLE ANNOYED YOU BY CRITICIZING YOUR DRINKING: NO
CONSUMPTION TOTAL: INCOMPLETE
HAVE YOU EVER FELT YOU SHOULD CUT DOWN ON YOUR DRINKING: NO
TOTAL SCORE: 0

## 2023-12-11 NOTE — ED PROVIDER NOTES
ED Provider Note    CHIEF COMPLAINT  Chief Complaint   Patient presents with    Nausea    Diarrhea     X 1 week       EXTERNAL RECORDS REVIEWED  Reviewed chest x-ray and provider report from urgent care earlier today    HPI/ROS  LIMITATION TO HISTORY   None  OUTSIDE HISTORIAN(S):  EMS provided additional history    Herber Hamilton is a 78 y.o. male who presents urgent care for fatigue nausea vomiting diarrhea cough not feeling well.  The patient was referred from urgent care as he has been feeling poorly for several several days.  He was apparently hypotensive in the field and referred here for higher level of care.  Apparently he and his wife have both been ill for around 2 to 3 days.  Patient reports poor oral intake nausea vomiting and diarrhea.    PAST MEDICAL HISTORY   has a past medical history of Depression (1/12/2010), Dyslipidemia, HTN (hypertension) (1/12/2010), Hypertension, Sleep apnea, and UTI (urinary tract infection).    SURGICAL HISTORY   has a past surgical history that includes other; trans urethral resection prostate (3/19/2009); gastroscopy-endo (3/13/2012); gastroscopy-endo (3/14/2012); and tonsillectomy.    FAMILY HISTORY  Family History   Problem Relation Age of Onset    Non-contributory Mother         unknown    Heart Disease Father     Arthritis Neg Hx     Lung Disease Neg Hx     Genetic Disorder Neg Hx     Cancer Neg Hx     Psychiatric Illness Neg Hx     Diabetes Neg Hx     Hypertension Neg Hx     Hyperlipidemia Neg Hx     Stroke Neg Hx     Alcohol/Drug Neg Hx        SOCIAL HISTORY  Social History     Tobacco Use    Smoking status: Never    Smokeless tobacco: Never   Vaping Use    Vaping Use: Never used   Substance and Sexual Activity    Alcohol use: Yes     Alcohol/week: 0.0 oz     Comment: rarely    Drug use: No    Sexual activity: Not Currently       CURRENT MEDICATIONS  Home Medications       Reviewed by Alexsandra Pimentel R.N. (Registered Nurse) on 12/11/23 at 1248  Med List Status:  "Partial     Medication Last Dose Status   amLODIPine (NORVASC) 10 MG Tab  Active   ascorbic acid (VITAMIN C) 500 MG tablet  Active   atorvastatin (LIPITOR) 10 MG Tab  Active   benazepril (LOTENSIN) 40 MG tablet  Active   cyanocobalamin (VITAMIN B12) 1000 MCG Tab  Active   divalproex ER (DEPAKOTE ER) 500 MG TABLET SR 24 HR  Active   hydroCHLOROthiazide (HYDRODIURIL) 12.5 MG tablet  Active   labetalol (NORMODYNE) 100 MG Tab  Active   mirtazapine (REMERON) 45 MG tablet  Active   omeprazole (PRILOSEC) 40 MG delayed-release capsule  Active   potassium chloride SA (KDUR) 20 MEQ Tab CR  Active   SIMETHICONE-80 PO  Active   terazosin (HYTRIN) 2 MG Cap  Active   traMADol (ULTRAM) 50 MG Tab  Active   vitamin D (CHOLECALCIFEROL) 1000 UNIT Tab  Active   Zinc 50 MG Tab  Active                    ALLERGIES  Allergies   Allergen Reactions    Escitalopram Diarrhea     Looks like \"chili\"    Metoprolol Diarrhea     Looks like \"Chili\"    Pcn [Penicillins]        PHYSICAL EXAM  VITAL SIGNS: /58   Pulse (!) 58   Temp 36.2 °C (97.2 °F) (Temporal)   Resp 14   Ht 1.727 m (5' 8\")   Wt 77.1 kg (170 lb)   SpO2 92%   BMI 25.85 kg/m²    Pulse ox interpretation: I interpret this pulse ox as normal.  Constitutional: Alert and oriented x 3, ill-appearing  HEENT: Atraumatic normocephalic, pupils are equal round reactive to light extraocular movements are intact. The nares is clear, external ears are normal, mouth shows dry t mucous membranes normal dentition for age  Neck: Supple, no JVD no tracheal deviation  Cardiovascular: Regular rate and rhythm no murmur rub or gallop 2+ pulses peripherally x4  Thorax & Lungs: Bilateral rhonchi  GI: Soft nontender nondistended positive bowel sounds, no peritoneal signs  Skin: Warm dry no acute rash or lesion  Musculoskeletal: Moving all extremities with full range and 5 of 5 strength no acute  deformity  Neurologic: Cranial nerves III through XII are grossly intact no sensory deficit no cerebellar " dysfunction   Psychiatric: Appropriate affect for situation at this time          DIAGNOSTIC STUDIES / PROCEDURES    LABS  Results for orders placed or performed during the hospital encounter of 12/11/23   CBC WITH DIFFERENTIAL   Result Value Ref Range    WBC 3.7 (L) 4.8 - 10.8 K/uL    RBC 4.75 4.70 - 6.10 M/uL    Hemoglobin 14.4 14.0 - 18.0 g/dL    Hematocrit 42.0 42.0 - 52.0 %    MCV 88.4 81.4 - 97.8 fL    MCH 30.3 27.0 - 33.0 pg    MCHC 34.3 32.3 - 36.5 g/dL    RDW 42.1 35.9 - 50.0 fL    Platelet Count 136 (L) 164 - 446 K/uL    MPV 10.3 9.0 - 12.9 fL    Neutrophils-Polys 56.20 44.00 - 72.00 %    Lymphocytes 22.40 22.00 - 41.00 %    Monocytes 19.70 (H) 0.00 - 13.40 %    Eosinophils 0.30 0.00 - 6.90 %    Basophils 0.30 0.00 - 1.80 %    Immature Granulocytes 1.10 (H) 0.00 - 0.90 %    Nucleated RBC 0.00 0.00 - 0.20 /100 WBC    Neutrophils (Absolute) 2.09 1.82 - 7.42 K/uL    Lymphs (Absolute) 0.83 (L) 1.00 - 4.80 K/uL    Monos (Absolute) 0.73 0.00 - 0.85 K/uL    Eos (Absolute) 0.01 0.00 - 0.51 K/uL    Baso (Absolute) 0.01 0.00 - 0.12 K/uL    Immature Granulocytes (abs) 0.04 0.00 - 0.11 K/uL    NRBC (Absolute) 0.00 K/uL   COMP METABOLIC PANEL   Result Value Ref Range    Sodium 137 135 - 145 mmol/L    Potassium 3.7 3.6 - 5.5 mmol/L    Chloride 98 96 - 112 mmol/L    Co2 22 20 - 33 mmol/L    Anion Gap 17.0 (H) 7.0 - 16.0    Glucose 107 (H) 65 - 99 mg/dL    Bun 65 (H) 8 - 22 mg/dL    Creatinine 2.52 (H) 0.50 - 1.40 mg/dL    Calcium 8.4 (L) 8.5 - 10.5 mg/dL    Correct Calcium 8.6 8.5 - 10.5 mg/dL    AST(SGOT) 12 12 - 45 U/L    ALT(SGPT) 11 2 - 50 U/L    Alkaline Phosphatase 60 30 - 99 U/L    Total Bilirubin 0.4 0.1 - 1.5 mg/dL    Albumin 3.7 3.2 - 4.9 g/dL    Total Protein 6.4 6.0 - 8.2 g/dL    Globulin 2.7 1.9 - 3.5 g/dL    A-G Ratio 1.4 g/dL   LIPASE   Result Value Ref Range    Lipase 33 11 - 82 U/L   LACTIC ACID   Result Value Ref Range    Lactic Acid 1.5 0.5 - 2.0 mmol/L   PROCALCITONIN   Result Value Ref Range     Procalcitonin 0.26 (H) <0.25 ng/mL   ESTIMATED GFR   Result Value Ref Range    GFR (CKD-EPI) 25 (A) >60 mL/min/1.73 m 2   POC CoV-2, FLU A/B, RSV by PCR   Result Value Ref Range    POC Influenza A RNA, PCR Negative Negative    POC Influenza B RNA, PCR Negative Negative    POC RSV, by PCR Negative Negative    POC SARS-CoV-2, PCR DETECTED (AA)         RADIOLOGY  I have independently interpreted the diagnostic imaging associated with this visit and am waiting the final reading from the radiologist.   My preliminary interpretation is as follows: Bibasilar atelectasis  Radiologist interpretation:      12/11/2023 12:00 PM     HISTORY/REASON FOR EXAM:  Chest Pain     TECHNIQUE/EXAM DESCRIPTION: PA and lateral views of the chest.     COMPARISON:  Chest x-ray 3/13/2012     FINDINGS:  Curvilinear opacities in the lung bases..  The cardiac silhouette is normal in size.  No effusions or pneumothoraces are present.  There are no significant osseous abnormalities.  The visualized portions of the upper abdomen are within normal limits.        IMPRESSION:     1.  Slight bibasilar atelectasis.  COURSE & MEDICAL DECISION MAKING    ED Observation Status? No; Patient does not meet criteria for ED Observation.     INITIAL ASSESSMENT, COURSE AND PLAN  Care Narrative:     This is a very pleasant 78-year-old gentleman who presented from urgent care for not feeling well for several days.  I was worried about possible sepsis, pneumonia, influenza COVID RSV.  Here he was extremely dehydrated.  Large-bore IV was established and he was given a fluid bolus as well as antiemetics.  His laboratory studies here reflect a low white blood cell count with no bandemia.  He does have evidence of an elevated BUN and creatinine consistent with acute kidney injury and chest x-ray demonstrates early pneumonitis pattern and he is hypoxic and required 2 L nasal cannula.  He is positive for COVID-19 and was given Decadron per protocol as well.  The patient  will be admitted to the hospital service for further treatment and evaluation    HYDRATION: Based on the patient's presentation of Acute Kidney Injury the patient was given IV fluids. IV Hydration was used because oral hydration was not adequate alone. Upon recheck following hydration, the patient was miproved.      ADDITIONAL PROBLEM LIST    DISPOSITION AND DISCUSSIONS  I have discussed management of the patient with the following physicians and AYAKA's: Discussed with admitting hospitalist    Discussion of management with other QHP or appropriate source(s): Discussed with ER pharmacist    Escalation of care considered, and ultimately not performed: None    Barriers to care at this time, including but not limited to: None.     Decision tools and prescription drugs considered including, but not limited to: None.    FINAL DIAGNOSIS  1. CINDY (acute kidney injury) (HCC)        2. COVID-19        3. Hypoxia                 Electronically signed by: Pascual Murphy M.D., 12/11/2023 12:55 PM

## 2023-12-11 NOTE — ASSESSMENT & PLAN NOTE
Patient reported having a week of diarrhea, shortness of breath and generalized weakness, was seen in urgent care noted to be hypotensive and instructed to present himself to the ED.  C  hest x-ray with no evidence of pulmonary infiltrate or effusion.    Patient started on Decadron.      Overnight, patient required 2 L of oxygen, continue to wean as tolerated.  Nursing to perform home O2 evaluation today to determine if patient requires oxygen on discharge.    Patient noted to have some faint rales on exam today, given one-time dose of Lasix.

## 2023-12-11 NOTE — ED TRIAGE NOTES
Chief Complaint   Patient presents with    Nausea    Diarrhea     X 1 week      Pt BIB EMS from Urgent Care. Per report Pt has had symptoms x 1 week, lack of appetite. Pt was hypotensive at  with BP of 56/37. Pt AOx 4 Bp 115/58  Pt reports fever over the last week and cough.

## 2023-12-11 NOTE — H&P
Hospital Medicine History & Physical Note    Date of Service  12/11/2023    Primary Care Physician  Donald Pelletier M.D.    Consultants  None    Code Status  Full Code    Chief Complaint  Chief Complaint   Patient presents with    Nausea    Diarrhea     X 1 week       History of Presenting Illness  Herber Hamilton is a 78 y.o. male who presented 12/11/2023 with generalized weakness x one week.  Patient reports progressively worsening generalized weakness, shortness of breath, multiple episodes of clear-colored diarrhea for the last week, prompting her to come to ER for further evaluation    In ED, patient found to have hypoxia. Pertinent labs: pre renal CINDY. COVID +.     I discussed the plan of care with patient.    Review of Systems  Review of Systems   Constitutional:  Positive for malaise/fatigue.   HENT: Negative.     Eyes: Negative.    Respiratory: Negative.     Cardiovascular: Negative.    Gastrointestinal:  Positive for diarrhea.   Genitourinary: Negative.    Musculoskeletal: Negative.    Skin: Negative.    Neurological: Negative.    Endo/Heme/Allergies: Negative.    Psychiatric/Behavioral: Negative.         Past Medical History   has a past medical history of Depression (1/12/2010), Dyslipidemia, HTN (hypertension) (1/12/2010), Hypertension, Sleep apnea, and UTI (urinary tract infection).    Surgical History   has a past surgical history that includes other; trans urethral resection prostate (3/19/2009); gastroscopy-endo (3/13/2012); gastroscopy-endo (3/14/2012); and tonsillectomy.     Family History  family history includes Heart Disease in his father; Non-contributory in his mother.   Family history reviewed with patient. There is no family history that is pertinent to the chief complaint.     Social History   reports that he has never smoked. He has never used smokeless tobacco. He reports current alcohol use. He reports that he does not use drugs.    Allergies  Allergies   Allergen Reactions     "Escitalopram Diarrhea     Looks like \"chili\"    Metoprolol Diarrhea     Looks like \"Chili\"    Pcn [Penicillins]        Medications  Prior to Admission Medications   Prescriptions Last Dose Informant Patient Reported? Taking?   SIMETHICONE-80 PO   Yes No   Sig: Take 80 mg by mouth 4 times a day. Chew one tablet by mouth 4 times a day   Zinc 50 MG Tab   Yes No   Sig: Take 50 mg by mouth every day.   amLODIPine (NORVASC) 10 MG Tab   No No   Sig: Take 1 Tab by mouth every day.   ascorbic acid (VITAMIN C) 500 MG tablet   Yes No   Sig: Take 500 mg by mouth every day.   atorvastatin (LIPITOR) 10 MG Tab   No No   Sig: Take 1 Tab by mouth every day.   benazepril (LOTENSIN) 40 MG tablet   No No   Sig: Take 1 Tab by mouth every day.   cyanocobalamin (VITAMIN B12) 1000 MCG Tab   Yes No   Sig: Take 1,000 mcg by mouth every day.   divalproex ER (DEPAKOTE ER) 500 MG TABLET SR 24 HR   Yes No   Sig: Take 500 mg by mouth every day.   hydroCHLOROthiazide (HYDRODIURIL) 12.5 MG tablet   No No   Sig: Take 2 Tablets by mouth every day.   labetalol (NORMODYNE) 100 MG Tab   Yes No   Sig: Take 100 mg by mouth 2 times a day.   mirtazapine (REMERON) 45 MG tablet   Yes No   Sig: Take 45 mg by mouth every evening.   omeprazole (PRILOSEC) 40 MG delayed-release capsule   Yes No   Sig: Take 40 mg by mouth every day. Every morning at 7am on an empty stomach before meal   potassium chloride SA (KDUR) 20 MEQ Tab CR   Yes No   Sig: Take 20 mEq by mouth every day. Take half a tab daily   terazosin (HYTRIN) 2 MG Cap   Yes No   Sig: Take 2 mg by mouth every evening.   traMADol (ULTRAM) 50 MG Tab   Yes No   Sig: Take 50 mg by mouth every 6 hours as needed.  FOR PAIN   vitamin D (CHOLECALCIFEROL) 1000 UNIT Tab   Yes No   Sig: Take 1,000 Units by mouth every day.      Facility-Administered Medications: None       Physical Exam  Temp:  [36.2 °C (97.2 °F)] 36.2 °C (97.2 °F)  Pulse:  [56-58] 56  Resp:  [14-20] 16  BP: ()/(39-58) 104/55  SpO2:  [89 %-94 %] " "89 %  Blood Pressure : 104/55   Temperature: 36.2 °C (97.2 °F)   Pulse: (!) 56   Respiration: 16   Pulse Oximetry: 89 %       Physical Exam  Constitutional:       Appearance: Normal appearance. He is normal weight.   HENT:      Head: Normocephalic.      Nose: Nose normal.      Mouth/Throat:      Mouth: Mucous membranes are moist.   Eyes:      Pupils: Pupils are equal, round, and reactive to light.   Cardiovascular:      Rate and Rhythm: Normal rate and regular rhythm.      Pulses: Normal pulses.   Pulmonary:      Effort: Pulmonary effort is normal.      Breath sounds: Normal breath sounds.   Abdominal:      General: Abdomen is flat. Bowel sounds are normal.      Palpations: Abdomen is soft.   Musculoskeletal:         General: Normal range of motion.      Cervical back: Neck supple.   Skin:     General: Skin is warm.   Neurological:      General: No focal deficit present.      Mental Status: He is alert and oriented to person, place, and time. Mental status is at baseline.   Psychiatric:         Mood and Affect: Mood normal.         Behavior: Behavior normal.         Thought Content: Thought content normal.         Judgment: Judgment normal.         Laboratory:  Recent Labs     12/11/23  1249   WBC 3.7*   RBC 4.75   HEMOGLOBIN 14.4   HEMATOCRIT 42.0   MCV 88.4   MCH 30.3   MCHC 34.3   RDW 42.1   PLATELETCT 136*   MPV 10.3     Recent Labs     12/11/23  1249   SODIUM 137   POTASSIUM 3.7   CHLORIDE 98   CO2 22   GLUCOSE 107*   BUN 65*   CREATININE 2.52*   CALCIUM 8.4*     Recent Labs     12/11/23  1249   ALTSGPT 11   ASTSGOT 12   ALKPHOSPHAT 60   TBILIRUBIN 0.4   LIPASE 33   GLUCOSE 107*         No results for input(s): \"NTPROBNP\" in the last 72 hours.      No results for input(s): \"TROPONINT\" in the last 72 hours.    Imaging:  No orders to display       X-Ray:  I have personally reviewed the images and compared with prior images.    Assessment/Plan:  Justification for Admission Status  I anticipate this patient will " require at least two midnights for appropriate medical management, necessitating inpatient admission because pt has acute hypoxemic respiratory failure    Patient will need a Med/Surg bed on EMERGENCY service .  The need is secondary to hypoxia.    * Acute hypoxemic respiratory failure (HCC)  Assessment & Plan  Spoke to ERP.  Patient found with hypoxemia requiring nasal cannula.  Found to be COVID-positive.  Decadron  COVID precautions      ACP (advance care planning)  Assessment & Plan  16 minutes spent discussing goals of care with patient.  He was explained his condition, diagnosis, treatment plan.  He was asked about CODE STATUS, to which at this time, he would like to be full code and to have everything done, including chest compressions and intubation if needed    CINDY (acute kidney injury) ([facility])  Assessment & Plan  Found to have prerenal CINDY consistent with symptomology of nausea decreased appetite diarrhea  Fluids  Serial BMP    Hyperlipidemia- (present on admission)  Assessment & Plan  Continue lipitor    Hypertension- (present on admission)  Assessment & Plan  Restart as needed  Avoid nephrotoxic medications        VTE prophylaxis: heparin ppx

## 2023-12-11 NOTE — ASSESSMENT & PLAN NOTE
16 minutes spent discussing goals of care with patient.  He was explained his condition, diagnosis, treatment plan.  He was asked about CODE STATUS, to which at this time, he would like to be full code and to have everything done, including chest compressions and intubation if needed

## 2023-12-11 NOTE — ED NOTES
Med rec updated and complete. Allergies reviewed.  Confirmed name and date of birth.   Pt denies anticoagulant and antiplatelet medications.  No antibiotic use in last 30 days.      [facility] = [phone]    Tramadol is filled at [facility] = [phone]

## 2023-12-11 NOTE — PROGRESS NOTES
Chief Complaint   Patient presents with    Cough     Congestion,diarrhea,not eating or drinking,x1 week       HISTORY OF PRESENT ILLNESS: Patient is a pleasant 78 y.o. male who presents to urgent care today patient comes in today with multiple complaints of weakness, diarrhea, shortness of breath, decreased appetite for the last week.  Patient denies any fevers, no chest pain.  No nausea or vomiting, no complaints of major abdominal pain.  He has not taken anything for this.     Patient Active Problem List    Diagnosis Date Noted    BMI 26.0-26.9,adult 08/29/2023    Exudative age-related macular degeneration (HCC) 08/29/2023    Narcotic dependence (HCC) 08/29/2023    Chronic pain syndrome 08/21/2023    Scoliosis 07/19/2023    Elevated PSA 05/07/2019    Severe episode of recurrent major depressive disorder, with psychotic features ([facility]) 05/07/2018    PTSD (post-traumatic stress disorder) 05/07/2018    Benign prostatic hyperplasia with lower urinary tract symptoms 11/06/2017    ALFRED on CPAP 08/19/2016    RLS (restless legs syndrome) 06/01/2015    History of GI bleed 05/09/2013    Colon polyp 05/02/2011    Dyslipidemia 05/02/2011    Essential hypertension 01/12/2010       Allergies:Escitalopram, Metoprolol, and Pcn [penicillins]    Current Outpatient Medications Ordered in Epic   Medication Sig Dispense Refill    divalproex ER (DEPAKOTE ER) 500 MG TABLET SR 24 HR Take 500 mg by mouth every day.      labetalol (NORMODYNE) 100 MG Tab Take 100 mg by mouth 2 times a day.      mirtazapine (REMERON) 45 MG tablet Take 45 mg by mouth every evening.      omeprazole (PRILOSEC) 40 MG delayed-release capsule Take 40 mg by mouth every day. Every morning at 7am on an empty stomach before meal      potassium chloride SA (KDUR) 20 MEQ Tab CR Take 20 mEq by mouth every day. Take half a tab daily      SIMETHICONE-80 PO Take 80 mg by mouth 4 times a day. Chew one tablet by mouth 4 times a day      terazosin (HYTRIN) 2 MG Cap Take 2 mg by  mouth every evening.      Zinc 50 MG Tab Take 50 mg by mouth every day.      cyanocobalamin (VITAMIN B12) 1000 MCG Tab Take 1,000 mcg by mouth every day.      vitamin D (CHOLECALCIFEROL) 1000 UNIT Tab Take 1,000 Units by mouth every day.      ascorbic acid (VITAMIN C) 500 MG tablet Take 500 mg by mouth every day.      hydroCHLOROthiazide (HYDRODIURIL) 12.5 MG tablet Take 2 Tablets by mouth every day. 90 Tablet 0    traMADol (ULTRAM) 50 MG Tab Take 50 mg by mouth every 6 hours as needed.  FOR PAIN      amLODIPine (NORVASC) 10 MG Tab Take 1 Tab by mouth every day. 100 Tab 3    benazepril (LOTENSIN) 40 MG tablet Take 1 Tab by mouth every day. 100 Tab 3    atorvastatin (LIPITOR) 10 MG Tab Take 1 Tab by mouth every day. 100 Tab 3     No current Epic-ordered facility-administered medications on file.       Past Medical History:   Diagnosis Date    Depression 2010    Dyslipidemia     HTN (hypertension) 2010    Hypertension     Sleep apnea     UTI (urinary tract infection)     NOW AND 2 MONTHS AGO POST OP       Social History     Tobacco Use    Smoking status: Never    Smokeless tobacco: Never   Vaping Use    Vaping Use: Never used   Substance Use Topics    Alcohol use: Yes     Alcohol/week: 0.0 oz     Comment: rarely    Drug use: No       Family Status   Relation Name Status    Mo      Fa      Sis      Neg Hx  (Not Specified)     Family History   Problem Relation Age of Onset    Non-contributory Mother         unknown    Heart Disease Father     Arthritis Neg Hx     Lung Disease Neg Hx     Genetic Disorder Neg Hx     Cancer Neg Hx     Psychiatric Illness Neg Hx     Diabetes Neg Hx     Hypertension Neg Hx     Hyperlipidemia Neg Hx     Stroke Neg Hx     Alcohol/Drug Neg Hx        ROS:  Review of Systems   Constitutional: Negative for fever, chills, weight loss, positive malaise, and fatigue.   HENT: Negative for ear pain, nosebleeds, congestion, sore throat and neck pain.    Eyes: Negative  "for vision changes.   Neuro: Negative for headache, sensory changes, weakness, seizure, LOC.   Cardiovascular: Negative for chest pain, palpitations, orthopnea and leg swelling.   Respiratory: Negative for cough, sputum production, positive shortness of breath and wheezing.   Gastrointestinal: Negative for abdominal pain, nausea, vomiting and positive diarrhea.   Genitourinary: Negative for dysuria, urgency and frequency.  Musculoskeletal: Negative for falls, neck pain, back pain, joint pain, myalgias.   Skin: Negative for rash, diaphoresis.     Exam:  BP (!) 57/39 (BP Location: Left arm, Patient Position: Sitting, BP Cuff Size: Adult)   Pulse (!) 56   Temp 36.2 °C (97.2 °F) (Temporal)   Resp 20   Ht 1.778 m (5' 10\")   Wt 78.9 kg (174 lb)   SpO2 94%   General: well-nourished, well-developed male in NAD, pale and diaphoretic  Head: normocephalic, atraumatic  Eyes: PERRLA, no conjunctival injection, acuity grossly intact, lids normal.  Ears: normal shape and symmetry, no tenderness, no discharge. External canals are without any significant edema or erythema. Tympanic membranes are without any inflammation, no effusion. Gross auditory acuity is intact.  Nose: symmetrical without tenderness, no discharge.  Mouth/Throat: reasonable hygiene, no erythema, exudates or tonsillar enlargement.  Neck: no masses, range of motion within normal limits, no tracheal deviation. No obvious thyroid enlargement.   Lymph: no cervical adenopathy. No supraclavicular adenopathy.   Neuro: alert and oriented. . No sensory deficit.  Pupils are round equal and reactive to light.   are strong bilaterally.  Cardiovascular: Sinus bradycardia rate of 48 no edema.  EKG shows sinus bradycardia, right bundle branch block, no ST elevation rate of 48.  Blood pressure is currently 57/39.  Pulmonary: no distress. Chest is symmetrical with respiration, no wheezes, crackles, or rhonchi.   Abdomen: soft, non-tender, no guarding, no " hepatosplenomegaly.  Musculoskeletal: no clubbing, appropriate muscle tone, gait is stable.  Skin: warm, dry, intact, no clubbing, no cyanosis, no rashes.   Psych: appropriate mood, affect, judgement.       Assessment/Plan:  1. Acute cough  DX-CHEST-2 VIEWS    POCT CoV-2, Flu A/B, RSV by PCR      2. Weakness  EKG - Clinic Performed    DX-CHEST-2 VIEWS    POCT CoV-2, Flu A/B, RSV by PCR    POCT Glucose      3. Hypotension, unspecified hypotension type  EKG - Clinic Performed    DX-CHEST-2 VIEWS    POCT CoV-2, Flu A/B, RSV by PCR      4. Diarrhea, unspecified type        Patient is a pleasant 78 y.o. male who presents to urgent care today patient comes in today with multiple complaints of weakness, diarrhea, shortness of breath, decreased appetite for the last week.  Patient denies any fevers, no chest pain.  No nausea or vomiting, no complaints of major abdominal pain.  He has not taken anything for this.  On physical exam patient appears pale and diaphoretic, S1 and S2 can be heard, no noted murmurs, no edema.  Lung sounds are clear to auscultation.  I did do an EKG to rule out potential causes, this shows sinus bradycardia with a right bundle branch block rate of 48.  No ST elevation noted.  Stomach is rounded, normal active bowel sounds.  Negative CVA tenderness.  I did go ahead and and order chest x-ray POCT flu and COVID were complete as well, patient given p.o. oral fluids here in the office to help with his hypotension.  POCT glucose was complete, 121.  Ultimately patient transferred by ambulance due to his low blood pressure and concerns for the need for IV fluids as well as potential imaging and labs.  Differential diagnosis to include infectious diarrhea, sepsis,  sinus bradycardia, pneumonia.  Transfer center notified.  Report called to Lou Charge RN.        Supportive care, differential diagnoses, and indications for immediate follow-up discussed with patient.   Pathogenesis of diagnosis discussed  including typical length and natural progression.   Instructed to return to clinic or nearest emergency department for any change in condition, further concerns, or worsening of symptoms.  Patient states understanding of the plan of care and discharge instructions.  Instructed to make an appointment, for follow up, with primary care provider.      Please note that this dictation was created using voice recognition software. I have made every reasonable attempt to correct obvious errors, but I expect that there are errors of grammar and possibly content that I did not discover before finalizing the note.      Juliana Pradhan APRN

## 2023-12-12 PROBLEM — U07.1 ACUTE HYPOXEMIC RESPIRATORY FAILURE DUE TO COVID-19 (HCC): Status: ACTIVE | Noted: 2023-12-11

## 2023-12-12 LAB
ANION GAP SERPL CALC-SCNC: 19 MMOL/L (ref 7–16)
BASOPHILS # BLD AUTO: 0.3 % (ref 0–1.8)
BASOPHILS # BLD: 0.01 K/UL (ref 0–0.12)
BUN SERPL-MCNC: 57 MG/DL (ref 8–22)
CALCIUM SERPL-MCNC: 8.3 MG/DL (ref 8.5–10.5)
CHLORIDE SERPL-SCNC: 101 MMOL/L (ref 96–112)
CO2 SERPL-SCNC: 18 MMOL/L (ref 20–33)
CREAT SERPL-MCNC: 1.76 MG/DL (ref 0.5–1.4)
EOSINOPHIL # BLD AUTO: 0 K/UL (ref 0–0.51)
EOSINOPHIL NFR BLD: 0 % (ref 0–6.9)
ERYTHROCYTE [DISTWIDTH] IN BLOOD BY AUTOMATED COUNT: 40.6 FL (ref 35.9–50)
GFR SERPLBLD CREATININE-BSD FMLA CKD-EPI: 39 ML/MIN/1.73 M 2
GLUCOSE SERPL-MCNC: 158 MG/DL (ref 65–99)
HCT VFR BLD AUTO: 43.1 % (ref 42–52)
HGB BLD-MCNC: 14.7 G/DL (ref 14–18)
IMM GRANULOCYTES # BLD AUTO: 0.03 K/UL (ref 0–0.11)
IMM GRANULOCYTES NFR BLD AUTO: 0.9 % (ref 0–0.9)
LYMPHOCYTES # BLD AUTO: 0.44 K/UL (ref 1–4.8)
LYMPHOCYTES NFR BLD: 12.5 % (ref 22–41)
MCH RBC QN AUTO: 30.3 PG (ref 27–33)
MCHC RBC AUTO-ENTMCNC: 34.1 G/DL (ref 32.3–36.5)
MCV RBC AUTO: 88.9 FL (ref 81.4–97.8)
MONOCYTES # BLD AUTO: 0.47 K/UL (ref 0–0.85)
MONOCYTES NFR BLD AUTO: 13.4 % (ref 0–13.4)
NEUTROPHILS # BLD AUTO: 2.56 K/UL (ref 1.82–7.42)
NEUTROPHILS NFR BLD: 72.9 % (ref 44–72)
NRBC # BLD AUTO: 0 K/UL
NRBC BLD-RTO: 0 /100 WBC (ref 0–0.2)
PLATELET # BLD AUTO: 113 K/UL (ref 164–446)
PMV BLD AUTO: 10.4 FL (ref 9–12.9)
POTASSIUM SERPL-SCNC: 4.1 MMOL/L (ref 3.6–5.5)
RBC # BLD AUTO: 4.85 M/UL (ref 4.7–6.1)
SODIUM SERPL-SCNC: 138 MMOL/L (ref 135–145)
WBC # BLD AUTO: 3.5 K/UL (ref 4.8–10.8)

## 2023-12-12 PROCEDURE — 85025 COMPLETE CBC W/AUTO DIFF WBC: CPT

## 2023-12-12 PROCEDURE — 99232 SBSQ HOSP IP/OBS MODERATE 35: CPT | Performed by: GENERAL PRACTICE

## 2023-12-12 PROCEDURE — 97161 PT EVAL LOW COMPLEX 20 MIN: CPT

## 2023-12-12 PROCEDURE — A9270 NON-COVERED ITEM OR SERVICE: HCPCS | Performed by: GENERAL PRACTICE

## 2023-12-12 PROCEDURE — 770001 HCHG ROOM/CARE - MED/SURG/GYN PRIV*

## 2023-12-12 PROCEDURE — A9270 NON-COVERED ITEM OR SERVICE: HCPCS | Performed by: STUDENT IN AN ORGANIZED HEALTH CARE EDUCATION/TRAINING PROGRAM

## 2023-12-12 PROCEDURE — 700105 HCHG RX REV CODE 258: Performed by: STUDENT IN AN ORGANIZED HEALTH CARE EDUCATION/TRAINING PROGRAM

## 2023-12-12 PROCEDURE — 700102 HCHG RX REV CODE 250 W/ 637 OVERRIDE(OP): Performed by: STUDENT IN AN ORGANIZED HEALTH CARE EDUCATION/TRAINING PROGRAM

## 2023-12-12 PROCEDURE — 700111 HCHG RX REV CODE 636 W/ 250 OVERRIDE (IP): Performed by: STUDENT IN AN ORGANIZED HEALTH CARE EDUCATION/TRAINING PROGRAM

## 2023-12-12 PROCEDURE — 80048 BASIC METABOLIC PNL TOTAL CA: CPT

## 2023-12-12 PROCEDURE — 700102 HCHG RX REV CODE 250 W/ 637 OVERRIDE(OP): Performed by: GENERAL PRACTICE

## 2023-12-12 PROCEDURE — 97530 THERAPEUTIC ACTIVITIES: CPT

## 2023-12-12 PROCEDURE — 36415 COLL VENOUS BLD VENIPUNCTURE: CPT

## 2023-12-12 RX ORDER — GUAIFENESIN 600 MG/1
1200 TABLET, EXTENDED RELEASE ORAL EVERY 12 HOURS
Status: DISCONTINUED | OUTPATIENT
Start: 2023-12-12 | End: 2023-12-14 | Stop reason: HOSPADM

## 2023-12-12 RX ORDER — DEXAMETHASONE 6 MG/1
6 TABLET ORAL DAILY
Status: DISCONTINUED | OUTPATIENT
Start: 2023-12-13 | End: 2023-12-14 | Stop reason: HOSPADM

## 2023-12-12 RX ORDER — ALBUTEROL SULFATE 90 UG/1
2 AEROSOL, METERED RESPIRATORY (INHALATION) EVERY 4 HOURS PRN
Status: DISCONTINUED | OUTPATIENT
Start: 2023-12-12 | End: 2023-12-14 | Stop reason: HOSPADM

## 2023-12-12 RX ORDER — FAMOTIDINE 20 MG/1
20 TABLET, FILM COATED ORAL DAILY
Status: DISCONTINUED | OUTPATIENT
Start: 2023-12-13 | End: 2023-12-14 | Stop reason: HOSPADM

## 2023-12-12 RX ORDER — ONDANSETRON 2 MG/ML
4 INJECTION INTRAMUSCULAR; INTRAVENOUS EVERY 4 HOURS PRN
Status: DISCONTINUED | OUTPATIENT
Start: 2023-12-12 | End: 2023-12-14 | Stop reason: HOSPADM

## 2023-12-12 RX ADMIN — GUAIFENESIN 1200 MG: 600 TABLET, EXTENDED RELEASE ORAL at 17:53

## 2023-12-12 RX ADMIN — GUAIFENESIN 1200 MG: 600 TABLET, EXTENDED RELEASE ORAL at 09:58

## 2023-12-12 RX ADMIN — ATORVASTATIN CALCIUM 10 MG: 10 TABLET, FILM COATED ORAL at 20:57

## 2023-12-12 RX ADMIN — MIRTAZAPINE 45 MG: 15 TABLET, FILM COATED ORAL at 20:57

## 2023-12-12 RX ADMIN — HEPARIN SODIUM 5000 UNITS: 5000 INJECTION, SOLUTION INTRAVENOUS; SUBCUTANEOUS at 20:59

## 2023-12-12 RX ADMIN — DIVALPROEX SODIUM 500 MG: 500 TABLET, EXTENDED RELEASE ORAL at 05:57

## 2023-12-12 RX ADMIN — SODIUM CHLORIDE, POTASSIUM CHLORIDE, SODIUM LACTATE AND CALCIUM CHLORIDE: 600; 310; 30; 20 INJECTION, SOLUTION INTRAVENOUS at 07:21

## 2023-12-12 RX ADMIN — SODIUM CHLORIDE, POTASSIUM CHLORIDE, SODIUM LACTATE AND CALCIUM CHLORIDE: 600; 310; 30; 20 INJECTION, SOLUTION INTRAVENOUS at 01:17

## 2023-12-12 RX ADMIN — HEPARIN SODIUM 5000 UNITS: 5000 INJECTION, SOLUTION INTRAVENOUS; SUBCUTANEOUS at 14:17

## 2023-12-12 RX ADMIN — HEPARIN SODIUM 5000 UNITS: 5000 INJECTION, SOLUTION INTRAVENOUS; SUBCUTANEOUS at 05:57

## 2023-12-12 RX ADMIN — DEXAMETHASONE SODIUM PHOSPHATE 6 MG: 4 INJECTION INTRA-ARTICULAR; INTRALESIONAL; INTRAMUSCULAR; INTRAVENOUS; SOFT TISSUE at 05:57

## 2023-12-12 ASSESSMENT — PAIN DESCRIPTION - PAIN TYPE
TYPE: ACUTE PAIN
TYPE: ACUTE PAIN

## 2023-12-12 ASSESSMENT — GAIT ASSESSMENTS
DISTANCE (FEET): 50
DEVIATION: BRADYKINETIC
GAIT LEVEL OF ASSIST: SUPERVISED

## 2023-12-12 ASSESSMENT — COGNITIVE AND FUNCTIONAL STATUS - GENERAL
MOBILITY SCORE: 24
SUGGESTED CMS G CODE MODIFIER MOBILITY: CH

## 2023-12-12 ASSESSMENT — PATIENT HEALTH QUESTIONNAIRE - PHQ9
1. LITTLE INTEREST OR PLEASURE IN DOING THINGS: NOT AT ALL
2. FEELING DOWN, DEPRESSED, IRRITABLE, OR HOPELESS: NOT AT ALL
SUM OF ALL RESPONSES TO PHQ9 QUESTIONS 1 AND 2: 0

## 2023-12-12 ASSESSMENT — ENCOUNTER SYMPTOMS: COUGH: 1

## 2023-12-12 ASSESSMENT — FIBROSIS 4 INDEX: FIB4 SCORE: 2.53

## 2023-12-12 NOTE — PROGRESS NOTES
Telemetry Report  Rhythm Sinus Rhythm  Heart Rate 63  Ectopy R PVC    MA  0.20  QRS 0.12  QT 0.46              Per telemetry room monitor

## 2023-12-12 NOTE — HOSPITAL COURSE
This is a 78-year-old male with past medical history of hypertension, dyslipidemia, ALFRED on CPAP, RLS, BPH who presented to the ED on 12/11/2023 with acute hypoxemic respiratory failure secondary to COVID-19 pneumonia and CINDY.    Patient reported having a week of diarrhea, shortness of breath and generalized weakness, was seen in urgent care noted to be hypotensive and instructed to present himself to the ED.  Chest x-ray with no evidence of pulmonary infiltrate or effusion.  Patient started on Decadron.  Initially was requiring 2 to 3 L of oxygen, now on room air.  Home O2 evaluation was performed, patient does not require oxygen at rest or on exertion.    Throughout the admission, patient noted to be low to normal tensive, all patient's antihypertensive medications were held.  Encourage patient to follow-up with his primary care physician to restart medications as needed.

## 2023-12-12 NOTE — PROGRESS NOTES
4 Eyes Skin Assessment Completed by TERRY Britt and TERRY Brown.    Head WDL  Ears WDL  Nose WDL  Mouth WDL  Neck WDL  Breast/Chest WDL  Shoulder Blades Non-Blanching  Spine WDL  (R) Arm/Elbow/Hand Bruising  (L) Arm/Elbow/Hand Bruising  Abdomen WDL  Groin WDL  Scrotum/Coccyx/Buttocks Redness and Blanching  (R) Leg WDL  (L) Leg WDL  (R) Heel/Foot/Toe WDL  (L) Heel/Foot/Toe WDL          Devices In Places Tele Box      Interventions In Place N/A    Possible Skin Injury No    Pictures Uploaded Into Epic N/A  Wound Consult Placed N/A  RN Wound Prevention Protocol Ordered No

## 2023-12-12 NOTE — CARE PLAN
The patient is Watcher - Medium risk of patient condition declining or worsening    Shift Goals  Clinical Goals: maintain O2 sats at or above 90%, increase activity  Patient Goals: breath easier, go home  Family Goals: BIANCA    Progress made toward(s) clinical / shift goals:    Problem: Knowledge Deficit - Standard  Goal: Patient and family/care givers will demonstrate understanding of plan of care, disease process/condition, diagnostic tests and medications  Outcome: Progressing     Problem: Respiratory  Goal: Patient will achieve/maintain optimum respiratory ventilation and gas exchange  Outcome: Progressing     Problem: Fall Risk  Goal: Patient will remain free from falls  Outcome: Progressing       Patient is not progressing towards the following goals:    Pt is not being discharged today

## 2023-12-12 NOTE — THERAPY
Physical Therapy   Initial Evaluation     Patient Name: Herber Hamilton  Age:  78 y.o., Sex:  male  Medical Record #: [id number]  Today's Date: 12/12/2023          Assessment  Patient is 78 y.o. male reports progressively worsening generalized weakness, shortness of breath, multiple episodes of clear-colored diarrhea for the last week.  Dx'd with hypoxia, COVID+, CINDY,   Past medical history of Depression, Dyslipidemia, HTN,  Sleep apnea, and UTI    Pt doing well with amb, no AD, transfers and activity tolerance. Mobility is limited to room due to isolation. No further needs from PT. Patient will not be actively followed for physical therapy services at this time, however may be seen if requested by physician for 1 more visit within 30 days to address any discharge or equipment needs.        Physical Therapy Initial Treatment Plan   Duration: Discharge Needs Only    DC Equipment Recommendations: None  Discharge Recommendations: Anticipate that the patient will have no further physical therapy needs after discharge from the hospital       Subjective    Pt agreed with PT.      Objective       12/12/23 1134   Pain 0 - 10 Group   Location Back  (chronic back pain due to scoliosis)   Location Orientation Mid   Therapist Pain Assessment Post Activity Pain Same as Prior to Activity   Prior Living Situation   Prior Services Home-Independent   Housing / Facility [address] [facility]   Steps Into Home 5  (platform 5inch steps)   Steps In Home 0   Rail Both Rail (Steps into Home)   Bathroom Set up Walk In Shower   Equipment Owned Single Point Cane   Lives with - Patient's Self Care Capacity Spouse   Comments drives, retired   Prior Level of Functional Mobility   Ambulation Distance community   Cognition    Cognition / Consciousness WDL   Active ROM Lower Body    Active ROM Lower Body  WDL   Strength Lower Body   Lower Body Strength  WDL   Coordination Lower Body    Coordination Lower Body  WDL   Balance Assessment   Sitting Balance  (Static) Good   Sitting Balance (Dynamic) Good   Standing Balance (Static) Fair +   Standing Balance (Dynamic) Fair +   Weight Shift Sitting Good   Weight Shift Standing Good   Bed Mobility    Supine to Sit Supervised   Scooting Supervised   Rolling Supervised   Gait Analysis   Gait Level Of Assist Supervised   Assistive Device None   Distance (Feet) 50   # of Times Distance was Traveled 1   Deviation Bradykinetic   Functional Mobility   Sit to Stand Supervised   Bed, Chair, Wheelchair Transfer Supervised   Toilet Transfers Supervised   How much difficulty does the patient currently have...   Turning over in bed (including adjusting bedclothes, sheets and blankets)? 4   Sitting down on and standing up from a chair with arms (e.g., wheelchair, bedside commode, etc.) 4   Moving from lying on back to sitting on the side of the bed? 4   How much help from another person does the patient currently need...   Moving to and from a bed to a chair (including a wheelchair)? 4   Need to walk in a hospital room? 4   Climbing 3-5 steps with a railing? 4   6 clicks Mobility Score 24   Activity Tolerance   Comments no SOB or S/S distress   Education Group   Education Provided Role of Physical Therapist;Gait Training   Role of Physical Therapist Patient Response Patient;Acceptance;Explanation;Verbal Demonstration   Gait Training Patient Response Patient;Acceptance;Explanation;Verbal Demonstration;Action Demonstration   Physical Therapy Initial Treatment Plan    Duration Discharge Needs Only   Anticipated Discharge Equipment and Recommendations   DC Equipment Recommendations None   Discharge Recommendations Anticipate that the patient will have no further physical therapy needs after discharge from the hospital   Interdisciplinary Plan of Care Collaboration   IDT Collaboration with  Nursing   Patient Position at End of Therapy Seated;Chair Alarm On;Call Light within Reach;Tray Table within Reach;Phone within Reach   Collaboration  Comments RN updated

## 2023-12-12 NOTE — DISCHARGE PLANNING
[facility]/ [facility] TCN chart review completed. Due to low LACE 19 and high 6 click score 24 as well as patients currently documented level of function, no TCN needs anticipated in patient discharge planning at this time. Additionally patient is on room air.  Should post acute discharge needs arise warranting TCN involvement, please contact TCN team via Voalte. Thank you.

## 2023-12-12 NOTE — PROGRESS NOTES
4 Eyes Skin Assessment Completed by TERRY Britt and TERRY Brown.    Head WDL  Ears WDL  Nose WDL  Mouth WDL  Neck WDL  Breast/Chest WDL  Shoulder Blades WDL  Spine WDL  (R) Arm/Elbow/Hand Bruising  (L) Arm/Elbow/Hand Bruising  Abdomen WDL  Groin WDL  Scrotum/Coccyx/Buttocks Redness and Blanching  (R) Leg WDL  (L) Leg WDL  (R) Heel/Foot/Toe WDL  (L) Heel/Foot/Toe WDL          Devices In Places Tele Box      Interventions In Place Waffle Overlay and Pillows    Possible Skin Injury No    Pictures Uploaded Into Epic N/A  Wound Consult Placed N/A  RN Wound Prevention Protocol Ordered No

## 2023-12-12 NOTE — CARE PLAN
The patient is Stable - Low risk of patient condition declining or worsening    Shift Goals  Clinical Goals: maintain O2 at or above 90%, decrease work of breathing  Patient Goals: breath easier, rest    Progress made toward(s) clinical / shift goals:    Problem: Knowledge Deficit - Standard  Goal: Patient and family/care givers will demonstrate understanding of plan of care, disease process/condition, diagnostic tests and medications  Description: Target End Date:  1-3 days or as soon as patient condition allows    Document in Patient Education    1.  Patient and family/caregiver oriented to unit, equipment, visitation policy and means for communicating concern  2.  Complete/review Learning Assessment  3.  Assess knowledge level of disease process/condition, treatment plan, diagnostic tests and medications  4.  Explain disease process/condition, treatment plan, diagnostic tests and medications  Note: Pt is updated of plan of care at bedside shift report, pt has no questions or concerns that need to be addressed. Pt is educated on call light usage and hourly rounding.      Problem: Respiratory  Goal: Patient will achieve/maintain optimum respiratory ventilation and gas exchange  Description: Target End Date:  Prior to discharge or change in level of care    Document on Assessment flowsheet    1.  Assess and monitor rate, rhythm, depth and effort of respiration  2.  Breath sounds assessed qshift and/or as needed  3.  Assess O2 saturation, administer/titrate oxygen as ordered  4.  Position patient for maximum ventilatory efficiency  5.  Turn, cough, and deep breath with splinting to improve effectiveness  6.  Collaborate with RT to administer medication/treatments per order  7.  Encourage use of incentive spirometer and encourage patient to cough after use and utilize splinting techniques if applicable  8.  Airway suctioning  9.  Monitor sputum production for changes in color, consistency and frequency  10. Perform  frequent oral hygiene  11. Alternate physical activity with rest periods  Note: Pt's o2 is maintained above 90%, pt's work of breathing is decreased, pt's oxygen demand has increased by 1.5 L O2 overnight, will attempt to ween tomorrow.      Problem: Fall Risk  Goal: Patient will remain free from falls  Description: Target End Date:  Prior to discharge or change in level of care    Document interventions on the [facility] Fall Risk Assessment    1.  Assess for fall risk factors  2.  Implement fall precautions  Note: Pt is assessed for risk for falls, pt has fall prevention protocols in place, pt is educated on risk for falls and how to call appropriately.

## 2023-12-13 PROBLEM — E83.39 HYPOPHOSPHATEMIA: Status: ACTIVE | Noted: 2023-12-13

## 2023-12-13 LAB
ANION GAP SERPL CALC-SCNC: 15 MMOL/L (ref 7–16)
BUN SERPL-MCNC: 45 MG/DL (ref 8–22)
CALCIUM SERPL-MCNC: 8.7 MG/DL (ref 8.5–10.5)
CHLORIDE SERPL-SCNC: 103 MMOL/L (ref 96–112)
CO2 SERPL-SCNC: 23 MMOL/L (ref 20–33)
CREAT SERPL-MCNC: 1.15 MG/DL (ref 0.5–1.4)
ERYTHROCYTE [DISTWIDTH] IN BLOOD BY AUTOMATED COUNT: 38.6 FL (ref 35.9–50)
GFR SERPLBLD CREATININE-BSD FMLA CKD-EPI: 65 ML/MIN/1.73 M 2
GLUCOSE SERPL-MCNC: 126 MG/DL (ref 65–99)
HCT VFR BLD AUTO: 39.7 % (ref 42–52)
HGB BLD-MCNC: 14.2 G/DL (ref 14–18)
MAGNESIUM SERPL-MCNC: 1.6 MG/DL (ref 1.5–2.5)
MCH RBC QN AUTO: 30.9 PG (ref 27–33)
MCHC RBC AUTO-ENTMCNC: 35.8 G/DL (ref 32.3–36.5)
MCV RBC AUTO: 86.5 FL (ref 81.4–97.8)
PHOSPHATE SERPL-MCNC: 2 MG/DL (ref 2.5–4.5)
PLATELET # BLD AUTO: 145 K/UL (ref 164–446)
PMV BLD AUTO: 10.6 FL (ref 9–12.9)
POTASSIUM SERPL-SCNC: 3.6 MMOL/L (ref 3.6–5.5)
RBC # BLD AUTO: 4.59 M/UL (ref 4.7–6.1)
SODIUM SERPL-SCNC: 141 MMOL/L (ref 135–145)
WBC # BLD AUTO: 7.1 K/UL (ref 4.8–10.8)

## 2023-12-13 PROCEDURE — 99232 SBSQ HOSP IP/OBS MODERATE 35: CPT | Performed by: GENERAL PRACTICE

## 2023-12-13 PROCEDURE — 770006 HCHG ROOM/CARE - MED/SURG/GYN SEMI*

## 2023-12-13 PROCEDURE — 84100 ASSAY OF PHOSPHORUS: CPT

## 2023-12-13 PROCEDURE — 85027 COMPLETE CBC AUTOMATED: CPT

## 2023-12-13 PROCEDURE — 700102 HCHG RX REV CODE 250 W/ 637 OVERRIDE(OP): Performed by: GENERAL PRACTICE

## 2023-12-13 PROCEDURE — A9270 NON-COVERED ITEM OR SERVICE: HCPCS | Performed by: GENERAL PRACTICE

## 2023-12-13 PROCEDURE — 83735 ASSAY OF MAGNESIUM: CPT

## 2023-12-13 PROCEDURE — 80048 BASIC METABOLIC PNL TOTAL CA: CPT

## 2023-12-13 PROCEDURE — 700102 HCHG RX REV CODE 250 W/ 637 OVERRIDE(OP): Performed by: STUDENT IN AN ORGANIZED HEALTH CARE EDUCATION/TRAINING PROGRAM

## 2023-12-13 PROCEDURE — 700111 HCHG RX REV CODE 636 W/ 250 OVERRIDE (IP): Performed by: STUDENT IN AN ORGANIZED HEALTH CARE EDUCATION/TRAINING PROGRAM

## 2023-12-13 PROCEDURE — RXMED WILLOW AMBULATORY MEDICATION CHARGE: Performed by: GENERAL PRACTICE

## 2023-12-13 PROCEDURE — A9270 NON-COVERED ITEM OR SERVICE: HCPCS | Performed by: STUDENT IN AN ORGANIZED HEALTH CARE EDUCATION/TRAINING PROGRAM

## 2023-12-13 RX ORDER — DEXAMETHASONE 6 MG/1
6 TABLET ORAL DAILY
Qty: 6 TABLET | Refills: 0 | Status: SHIPPED | OUTPATIENT
Start: 2023-12-15 | End: 2023-12-21

## 2023-12-13 RX ORDER — FUROSEMIDE 40 MG/1
40 TABLET ORAL ONCE
Status: COMPLETED | OUTPATIENT
Start: 2023-12-13 | End: 2023-12-13

## 2023-12-13 RX ORDER — POLYETHYLENE GLYCOL 3350 17 G/17G
1 POWDER, FOR SOLUTION ORAL DAILY
Status: DISCONTINUED | OUTPATIENT
Start: 2023-12-13 | End: 2023-12-14 | Stop reason: HOSPADM

## 2023-12-13 RX ADMIN — HEPARIN SODIUM 5000 UNITS: 5000 INJECTION, SOLUTION INTRAVENOUS; SUBCUTANEOUS at 13:22

## 2023-12-13 RX ADMIN — DIBASIC SODIUM PHOSPHATE, MONOBASIC POTASSIUM PHOSPHATE AND MONOBASIC SODIUM PHOSPHATE 250 MG: 852; 155; 130 TABLET ORAL at 12:17

## 2023-12-13 RX ADMIN — HEPARIN SODIUM 5000 UNITS: 5000 INJECTION, SOLUTION INTRAVENOUS; SUBCUTANEOUS at 21:12

## 2023-12-13 RX ADMIN — HEPARIN SODIUM 5000 UNITS: 5000 INJECTION, SOLUTION INTRAVENOUS; SUBCUTANEOUS at 05:04

## 2023-12-13 RX ADMIN — DEXAMETHASONE 6 MG: 6 TABLET ORAL at 05:04

## 2023-12-13 RX ADMIN — DIBASIC SODIUM PHOSPHATE, MONOBASIC POTASSIUM PHOSPHATE AND MONOBASIC SODIUM PHOSPHATE 250 MG: 852; 155; 130 TABLET ORAL at 13:21

## 2023-12-13 RX ADMIN — GUAIFENESIN 1200 MG: 600 TABLET, EXTENDED RELEASE ORAL at 05:04

## 2023-12-13 RX ADMIN — DIVALPROEX SODIUM 500 MG: 500 TABLET, EXTENDED RELEASE ORAL at 05:05

## 2023-12-13 RX ADMIN — POLYETHYLENE GLYCOL 3350 1 PACKET: 17 POWDER, FOR SOLUTION ORAL at 16:45

## 2023-12-13 RX ADMIN — ACETAMINOPHEN 650 MG: 325 TABLET, FILM COATED ORAL at 09:01

## 2023-12-13 RX ADMIN — DIBASIC SODIUM PHOSPHATE, MONOBASIC POTASSIUM PHOSPHATE AND MONOBASIC SODIUM PHOSPHATE 500 MG: 852; 155; 130 TABLET ORAL at 08:45

## 2023-12-13 RX ADMIN — FUROSEMIDE 40 MG: 40 TABLET ORAL at 15:07

## 2023-12-13 RX ADMIN — MIRTAZAPINE 45 MG: 15 TABLET, FILM COATED ORAL at 21:12

## 2023-12-13 RX ADMIN — ATORVASTATIN CALCIUM 10 MG: 10 TABLET, FILM COATED ORAL at 21:12

## 2023-12-13 RX ADMIN — FAMOTIDINE 20 MG: 20 TABLET ORAL at 05:04

## 2023-12-13 RX ADMIN — GUAIFENESIN 1200 MG: 600 TABLET, EXTENDED RELEASE ORAL at 16:45

## 2023-12-13 ASSESSMENT — PAIN DESCRIPTION - PAIN TYPE
TYPE: ACUTE PAIN

## 2023-12-13 ASSESSMENT — PATIENT HEALTH QUESTIONNAIRE - PHQ9
SUM OF ALL RESPONSES TO PHQ9 QUESTIONS 1 AND 2: 0
2. FEELING DOWN, DEPRESSED, IRRITABLE, OR HOPELESS: NOT AT ALL
1. LITTLE INTEREST OR PLEASURE IN DOING THINGS: NOT AT ALL

## 2023-12-13 ASSESSMENT — FIBROSIS 4 INDEX: FIB4 SCORE: 1.97

## 2023-12-13 ASSESSMENT — ENCOUNTER SYMPTOMS
WEAKNESS: 1
COUGH: 1

## 2023-12-13 NOTE — PROGRESS NOTES
Hospital Medicine Daily Progress Note    Date of Service  12/12/2023    Chief Complaint  Herber Hamilton is a 78 y.o. male admitted 12/11/2023 with shortness of breath    Hospital Course  This is a 78-year-old male with past medical history of hypertension, dyslipidemia, ALFRED on CPAP, RLS, BPH who presented to the ED on 12/11/2023 with acute hypoxemic respiratory failure secondary to COVID-19 pneumonia and CINDY.    Patient reported having a week of diarrhea, shortness of breath and generalized weakness, was seen in urgent care noted to be hypotensive and instructed to present himself to the ED.  Chest x-ray with no evidence of pulmonary infiltrate or effusion.  Patient started on Decadron.  Initially was requiring 2 to 3 L of oxygen, now on room air.  Home O2 evaluation was performed, patient does not require oxygen at rest or on exertion.    Interval Problem Update  Patient reports his diarrhea has resolved, he is tolerating diet.    Patient initially requiring 2 to 3 L of oxygen, now on room air, home O2 evaluation was performed, patient does not require rest on exertion. Continue with Decadron.    CINDY improving, serial BMP.    Anticipate discharge home in the next 24 hours.    I have discussed this patient's plan of care and discharge plan at IDT rounds today with Case Management, Nursing, Nursing leadership, and other members of the IDT team.    Consultants/Specialty  none    Code Status  Full Code    Disposition  The patient is not medically cleared for discharge to home or a post-acute facility.  Anticipate discharge to: home with close outpatient follow-up    I have placed the appropriate orders for post-discharge needs.    Review of Systems  Review of Systems   Respiratory:  Positive for cough.    All other systems reviewed and are negative.       Physical Exam  Temp:  [35.8 °C (96.5 °F)-36.6 °C (97.8 °F)] 36.6 °C (97.8 °F)  Pulse:  [62-77] 75  Resp:  [18-20] 18  BP: (121-145)/(62-78) 125/68  SpO2:  [90 %-95 %]  90 %    Physical Exam  Vitals and nursing note reviewed.   Constitutional:       General: He is not in acute distress.     Appearance: Normal appearance.   HENT:      Head: Normocephalic and atraumatic.   Eyes:      Extraocular Movements: Extraocular movements intact.      Conjunctiva/sclera: Conjunctivae normal.      Pupils: Pupils are equal, round, and reactive to light.   Cardiovascular:      Rate and Rhythm: Normal rate and regular rhythm.      Pulses: Normal pulses.      Heart sounds: No murmur heard.     No friction rub. No gallop.   Pulmonary:      Effort: Pulmonary effort is normal. No respiratory distress.      Breath sounds: Rales (very faint) present. No wheezing or rhonchi.   Abdominal:      General: Bowel sounds are normal. There is no distension.      Palpations: Abdomen is soft.      Tenderness: There is no abdominal tenderness.   Musculoskeletal:         General: No swelling or tenderness. Normal range of motion.      Cervical back: Normal range of motion and neck supple. No muscular tenderness.      Right lower leg: No edema.      Left lower leg: No edema.   Skin:     General: Skin is warm and dry.      Capillary Refill: Capillary refill takes less than 2 seconds.      Findings: No bruising, erythema or rash.   Neurological:      General: No focal deficit present.      Mental Status: He is alert and oriented to person, place, and time.         Fluids    Intake/Output Summary (Last 24 hours) at 12/12/2023 1738  Last data filed at 12/12/2023 1601  Gross per 24 hour   Intake 1050 ml   Output 1501 ml   Net -451 ml       Laboratory  Recent Labs     12/11/23  1249 12/12/23  0128   WBC 3.7* 3.5*   RBC 4.75 4.85   HEMOGLOBIN 14.4 14.7   HEMATOCRIT 42.0 43.1   MCV 88.4 88.9   MCH 30.3 30.3   MCHC 34.3 34.1   RDW 42.1 40.6   PLATELETCT 136* 113*   MPV 10.3 10.4     Recent Labs     12/11/23  1249 12/12/23  0128   SODIUM 137 138   POTASSIUM 3.7 4.1   CHLORIDE 98 101   CO2 22 18*   GLUCOSE 107* 158*   BUN 65*  57*   CREATININE 2.52* 1.76*   CALCIUM 8.4* 8.3*                   Imaging  No orders to display        Assessment/Plan  * Acute hypoxemic respiratory failure due to COVID-19 ([facility])  Assessment & Plan  Patient reported having a week of diarrhea, shortness of breath and generalized weakness, was seen in urgent care noted to be hypotensive and instructed to present himself to the ED.  C  hest x-ray with no evidence of pulmonary infiltrate or effusion.    Patient started on Decadron.  Initially was requiring 2 to 3 L of oxygen, now on room air.    Home O2 evaluation was performed, patient does not require oxygen at rest or on exertion.    ACP (advance care planning)  Assessment & Plan  16 minutes spent discussing goals of care with patient.  He was explained his condition, diagnosis, treatment plan.  He was asked about CODE STATUS, to which at this time, he would like to be full code and to have everything done, including chest compressions and intubation if needed    CINDY (acute kidney injury) ([facility])  Assessment & Plan  Found to have prerenal CINDY consistent with symptomology of nausea decreased appetite diarrhea  CINDY improving, serial BMP.    Hyperlipidemia- (present on admission)  Assessment & Plan  Continue lipitor    Hypertension- (present on admission)  Assessment & Plan  Restart as needed  Avoid nephrotoxic medications         VTE prophylaxis: Heparin ppx    I have performed a physical exam and reviewed and updated ROS and Plan today (12/12/2023). In review of yesterday's note (12/11/2023), there are no changes except as documented above.

## 2023-12-13 NOTE — DISCHARGE INSTRUCTIONS
All your blood pressure medications were held due to low/normal blood pressure due to COVID-19 pneumonia.  Follow-up with your primary care and restart your blood pressure medications as indicated.

## 2023-12-13 NOTE — CARE PLAN
The patient is Watcher - Medium risk of patient condition declining or worsening    Shift Goals  Clinical Goals: maintain oxygen at or above 90%, increase incentive spirometer use  Patient Goals: rest, breath easier  Family Goals: BIANCA    Progress made toward(s) clinical / shift goals:   Problem: Knowledge Deficit - Standard  Goal: Patient and family/care givers will demonstrate understanding of plan of care, disease process/condition, diagnostic tests and medications  Outcome: Progressing     Problem: Respiratory  Goal: Patient will achieve/maintain optimum respiratory ventilation and gas exchange  Outcome: Progressing     Problem: Fall Risk  Goal: Patient will remain free from falls  Outcome: Progressing     Problem: Pain - Standard  Goal: Alleviation of pain or a reduction in pain to the patient’s comfort goal  Outcome: Progressing

## 2023-12-13 NOTE — ASSESSMENT & PLAN NOTE
Oral supplementation  Serial BMP, magnesium, phosphorus levels ordered for tomorrow morning to continue to monitor electrolytes

## 2023-12-13 NOTE — PROGRESS NOTES
Hospital Medicine Daily Progress Note    Date of Service  12/13/2023    Chief Complaint  Herber Hamilton is a 78 y.o. male admitted 12/11/2023 with shortness of breath    Hospital Course  This is a 78-year-old male with past medical history of hypertension, dyslipidemia, ALFRED on CPAP, RLS, BPH who presented to the ED on 12/11/2023 with acute hypoxemic respiratory failure secondary to COVID-19 pneumonia and CINDY.    Patient reported having a week of diarrhea, shortness of breath and generalized weakness, was seen in urgent care noted to be hypotensive and instructed to present himself to the ED.  Chest x-ray with no evidence of pulmonary infiltrate or effusion.  Patient started on Decadron.  Initially was requiring 2 to 3 L of oxygen, now on room air.  Home O2 evaluation was performed, patient does not require oxygen at rest or on exertion.    Interval Problem Update  Labs reviewed, CINDY resolved.    Patient yesterday initially was on room air.  Overnight, patient required 2 L of oxygen, continue to wean as tolerated.  Nursing to perform home O2 evaluation today to determine if patient requires oxygen on discharge.    Patient noted to have some faint rales on exam today, given one-time dose of Lasix.     Patient was also febrile this morning at 100.6 at 4 AM, will continue to monitor closely overnight.  If remains afebrile, can discharge home tomorrow.    I have discussed this patient's plan of care and discharge plan at IDT rounds today with Case Management, Nursing, Nursing leadership, and other members of the IDT team.    Consultants/Specialty  none    Code Status  Full Code    Disposition  The patient is not medically cleared for discharge to home or a post-acute facility.  Anticipate discharge to: home with close outpatient follow-up    I have placed the appropriate orders for post-discharge needs.    Review of Systems  Review of Systems   Constitutional:  Positive for malaise/fatigue.   Respiratory:  Positive for  cough.    Neurological:  Positive for weakness.   All other systems reviewed and are negative.       Physical Exam  Temp:  [36.6 °C (97.8 °F)-38.1 °C (100.6 °F)] 36.9 °C (98.4 °F)  Pulse:  [75-85] 78  Resp:  [17-18] 17  BP: (125-141)/(67-75) 137/67  SpO2:  [87 %-95 %] 92 %    Physical Exam  Vitals and nursing note reviewed.   Constitutional:       General: He is not in acute distress.     Appearance: Normal appearance.   HENT:      Head: Normocephalic and atraumatic.   Eyes:      Extraocular Movements: Extraocular movements intact.      Conjunctiva/sclera: Conjunctivae normal.      Pupils: Pupils are equal, round, and reactive to light.   Cardiovascular:      Rate and Rhythm: Normal rate and regular rhythm.      Pulses: Normal pulses.      Heart sounds: No murmur heard.     No friction rub. No gallop.   Pulmonary:      Effort: Pulmonary effort is normal. No respiratory distress.      Breath sounds: Rales (very faint) present. No wheezing or rhonchi.   Abdominal:      General: Bowel sounds are normal. There is no distension.      Palpations: Abdomen is soft.      Tenderness: There is no abdominal tenderness.   Musculoskeletal:         General: No swelling or tenderness. Normal range of motion.      Cervical back: Normal range of motion and neck supple. No muscular tenderness.      Right lower leg: No edema.      Left lower leg: No edema.   Skin:     General: Skin is warm and dry.      Capillary Refill: Capillary refill takes less than 2 seconds.      Findings: No bruising, erythema or rash.   Neurological:      General: No focal deficit present.      Mental Status: He is alert and oriented to person, place, and time.         Fluids    Intake/Output Summary (Last 24 hours) at 12/13/2023 1421  Last data filed at 12/13/2023 1219  Gross per 24 hour   Intake 540 ml   Output 1850 ml   Net -1310 ml       Laboratory  Recent Labs     12/11/23  1249 12/12/23  0128 12/13/23  0229   WBC 3.7* 3.5* 7.1   RBC 4.75 4.85 4.59*    HEMOGLOBIN 14.4 14.7 14.2   HEMATOCRIT 42.0 43.1 39.7*   MCV 88.4 88.9 86.5   MCH 30.3 30.3 30.9   MCHC 34.3 34.1 35.8   RDW 42.1 40.6 38.6   PLATELETCT 136* 113* 145*   MPV 10.3 10.4 10.6     Recent Labs     12/11/23  1249 12/12/23  0128 12/13/23  0229   SODIUM 137 138 141   POTASSIUM 3.7 4.1 3.6   CHLORIDE 98 101 103   CO2 22 18* 23   GLUCOSE 107* 158* 126*   BUN 65* 57* 45*   CREATININE 2.52* 1.76* 1.15   CALCIUM 8.4* 8.3* 8.7                   Imaging  No orders to display        Assessment/Plan  * Acute hypoxemic respiratory failure due to COVID-19 ([facility])  Assessment & Plan  Patient reported having a week of diarrhea, shortness of breath and generalized weakness, was seen in urgent care noted to be hypotensive and instructed to present himself to the ED.  C  hest x-ray with no evidence of pulmonary infiltrate or effusion.    Patient started on Decadron.      Overnight, patient required 2 L of oxygen, continue to wean as tolerated.  Nursing to perform home O2 evaluation today to determine if patient requires oxygen on discharge.    Patient noted to have some faint rales on exam today, given one-time dose of Lasix.     Hypophosphatemia  Assessment & Plan  Oral supplementation  Serial BMP, magnesium, phosphorus levels ordered for tomorrow morning to continue to monitor electrolytes     ACP (advance care planning)  Assessment & Plan  16 minutes spent discussing goals of care with patient.  He was explained his condition, diagnosis, treatment plan.  He was asked about CODE STATUS, to which at this time, he would like to be full code and to have everything done, including chest compressions and intubation if needed    CINDY (acute kidney injury) ([facility])  Assessment & Plan  Found to have prerenal CINDY consistent with symptomology of nausea decreased appetite diarrhea  Labs reviewed, CINDY resolved    Hyperlipidemia- (present on admission)  Assessment & Plan  Continue lipitor    Hypertension- (present on  admission)  Assessment & Plan  Restart as needed  Avoid nephrotoxic medications         VTE prophylaxis: Heparin ppx    I have performed a physical exam and reviewed and updated ROS and Plan today (12/13/2023). In review of yesterday's note (12/12/2023), there are no changes except as documented above.

## 2023-12-13 NOTE — PROGRESS NOTES
Telemetry Monitor Report    Rhythm: SR  BBB  Ectopy: PAC, PVC  HR 79   *transitioned to medical patient @ 12/12/23 @2035

## 2023-12-13 NOTE — CARE PLAN
The patient is Stable - Low risk of patient condition declining or worsening    Shift Goals  Clinical Goals: monitor and manage oxygen saturation for at or above 90%, promote IS use, rest  Patient Goals: Breath easier, rest  Family Goals: BIANCA    Progress made toward(s) clinical / shift goals:    Problem: Knowledge Deficit - Standard  Goal: Patient and family/care givers will demonstrate understanding of plan of care, disease process/condition, diagnostic tests and medications  Outcome: Progressing  Note: Patient educated on plan of care, including medication regimen, continued monitoring of oxygen demand and saturation, along with promotion of IS use, and also the plan to discharge this morning.       Problem: Fall Risk  Goal: Patient will remain free from falls  Outcome: Progressing  Note: Bed in lowest position. Bed alarm in place and on. Patient items all within reach, and patient using call light appropriately.        Patient is not progressing towards the following goals:      Problem: Respiratory  Goal: Patient will achieve/maintain optimum respiratory ventilation and gas exchange  Outcome: Not Progressing  Note: Patient having desaturations throughout this shift of 89% at rest on 1.5 L NC Patient increased to 2 L NC upon 0000 to promote oxygen saturation above 90%. Will attempt to wean patient again during waking hours in the morning.

## 2023-12-14 ENCOUNTER — PHARMACY VISIT (OUTPATIENT)
Dept: PHARMACY | Facility: MEDICAL CENTER | Age: 78
End: 2023-12-14
Payer: COMMERCIAL

## 2023-12-14 VITALS
HEIGHT: 68 IN | SYSTOLIC BLOOD PRESSURE: 162 MMHG | WEIGHT: 177.47 LBS | TEMPERATURE: 98.4 F | DIASTOLIC BLOOD PRESSURE: 94 MMHG | HEART RATE: 78 BPM | OXYGEN SATURATION: 94 % | RESPIRATION RATE: 16 BRPM | BODY MASS INDEX: 26.9 KG/M2

## 2023-12-14 PROCEDURE — 99239 HOSP IP/OBS DSCHRG MGMT >30: CPT | Performed by: GENERAL PRACTICE

## 2023-12-14 PROCEDURE — A9270 NON-COVERED ITEM OR SERVICE: HCPCS | Performed by: GENERAL PRACTICE

## 2023-12-14 PROCEDURE — 700111 HCHG RX REV CODE 636 W/ 250 OVERRIDE (IP): Performed by: STUDENT IN AN ORGANIZED HEALTH CARE EDUCATION/TRAINING PROGRAM

## 2023-12-14 PROCEDURE — 700102 HCHG RX REV CODE 250 W/ 637 OVERRIDE(OP): Performed by: GENERAL PRACTICE

## 2023-12-14 PROCEDURE — 700102 HCHG RX REV CODE 250 W/ 637 OVERRIDE(OP): Performed by: STUDENT IN AN ORGANIZED HEALTH CARE EDUCATION/TRAINING PROGRAM

## 2023-12-14 PROCEDURE — A9270 NON-COVERED ITEM OR SERVICE: HCPCS | Performed by: STUDENT IN AN ORGANIZED HEALTH CARE EDUCATION/TRAINING PROGRAM

## 2023-12-14 RX ADMIN — DEXAMETHASONE 6 MG: 6 TABLET ORAL at 05:20

## 2023-12-14 RX ADMIN — GUAIFENESIN 1200 MG: 600 TABLET, EXTENDED RELEASE ORAL at 05:18

## 2023-12-14 RX ADMIN — POLYETHYLENE GLYCOL 3350 1 PACKET: 17 POWDER, FOR SOLUTION ORAL at 05:18

## 2023-12-14 RX ADMIN — FAMOTIDINE 20 MG: 20 TABLET ORAL at 05:19

## 2023-12-14 RX ADMIN — HEPARIN SODIUM 5000 UNITS: 5000 INJECTION, SOLUTION INTRAVENOUS; SUBCUTANEOUS at 05:19

## 2023-12-14 RX ADMIN — DIVALPROEX SODIUM 500 MG: 500 TABLET, EXTENDED RELEASE ORAL at 05:19

## 2023-12-14 ASSESSMENT — PAIN DESCRIPTION - PAIN TYPE: TYPE: ACUTE PAIN

## 2023-12-14 NOTE — PROGRESS NOTES
4 Eyes Skin Assessment Completed by TERRY Campbell and TERRY Garcia.    Head WDL  Ears WDL  Nose WDL  Mouth WDL  Neck WDL  Breast/Chest WDL  Shoulder Blades WDL  Spine WDL  (R) Arm/Elbow/Hand Bruising  (L) Arm/Elbow/Hand Bruising  Abdomen Bruising  Groin WDL  Scrotum/Coccyx/Buttocks WDL  (R) Leg WDL  (L) Leg WDL  (R) Heel/Foot/Toe WDL  (L) Heel/Foot/Toe WDL          Devices In Places Blood Pressure Cuff, Pulse Ox, and Nasal Cannula      Interventions In Place Gray Ear Foams    Possible Skin Injury No    Pictures Uploaded Into Epic No, needs to be completed  Wound Consult Placed N/A  RN Wound Prevention Protocol Ordered No

## 2023-12-14 NOTE — DISCHARGE SUMMARY
Discharge Summary    CHIEF COMPLAINT ON ADMISSION  Chief Complaint   Patient presents with    Nausea    Diarrhea     X 1 week       Reason for Admission  ems     Admission Date  12/11/2023    CODE STATUS  Full Code    HPI & HOSPITAL COURSE  This is a 78-year-old male with past medical history of hypertension, dyslipidemia, ALFRED on CPAP, RLS, BPH who presented to the ED on 12/11/2023 with acute hypoxemic respiratory failure secondary to COVID-19 pneumonia and CINDY.    Patient reported having a week of diarrhea, shortness of breath and generalized weakness, was seen in urgent care noted to be hypotensive and instructed to present himself to the ED.  Chest x-ray with no evidence of pulmonary infiltrate or effusion.  Patient started on Decadron.  Initially was requiring 2 to 3 L of oxygen, now on room air.  Home O2 evaluation was performed, patient does not require oxygen at rest or on exertion.    Throughout the admission, patient noted to be low to normal tensive, all patient's antihypertensive medications were held.  Encourage patient to follow-up with his primary care physician to restart medications as needed.    Therefore, he is discharged in good and stable condition to home with close outpatient follow-up.    The patient met 2-midnight criteria for an inpatient stay at the time of discharge.    Discharge Date  12/14/2023    FOLLOW UP ITEMS POST DISCHARGE  Primary care physician    DISCHARGE DIAGNOSES  Principal Problem:    Acute hypoxemic respiratory failure due to COVID-19 (HCC) (POA: Unknown)  Active Problems:    Hypertension (POA: Yes)      Overview: Chronic condition.  Patient is on amlodipine 10 mg, hydrochlorothiazide 25       mg, labetalol 100 mg twice daily.  Report home blood pressure typically is       in 120s over 70s to 80s.  Denies prior cardiac history and stroke.      LDL < 70      Report on atorvastatin 10 mg for preventative    Hyperlipidemia (POA: Yes)      Overview: On atorvastatin 10 mg daily.   LDL from 2020 less than 70.  [facility]    CINDY (acute kidney injury) (HCC) (POA: Unknown)    ACP (advance care planning) (POA: Unknown)    Hypophosphatemia (POA: Unknown)  Resolved Problems:    * No resolved hospital problems. *      FOLLOW UP  Future Appointments   Date Time Provider Department Center   7/23/2024 11:00 AM Donald Pelletier M.D. [facility] LEAH Pelletier M.D.  [address]  [facility] [address]  [place] [address]  [phone]    Follow up        MEDICATIONS ON DISCHARGE     Medication List        START taking these medications        Instructions   dexamethasone 6 MG Tabs  Start taking on: December 15, 2023  Commonly known as: Decadron   Take 1 Tablet by mouth every day for 6 days.  Dose: 6 mg            CONTINUE taking these medications        Instructions   ascorbic acid 500 MG tablet  Commonly known as: Vitamin C   Take 500 mg by mouth every day.  Dose: 500 mg     atorvastatin 10 MG Tabs  Commonly known as: Lipitor   Take 1 Tab by mouth every day.  Dose: 10 mg     cyanocobalamin 1000 MCG Tabs  Commonly known as: Vitamin B12   Take 1,000 mcg by mouth every day.  Dose: 1,000 mcg     divalproex  MG Tb24  Commonly known as: Depakote ER   Take 500 mg by mouth every day.  Dose: 500 mg     mirtazapine 45 MG tablet  Commonly known as: Remeron   Take 45 mg by mouth every evening.  Dose: 45 mg     omeprazole 40 MG delayed-release capsule  Commonly known as: PriLOSEC   Take 40 mg by mouth every day.  Dose: 40 mg     PreserVision AREDS 2 Caps   Take 1 Capsule by mouth 2 times a day.  Dose: 1 Capsule     terazosin 2 MG Caps  Commonly known as: Hytrin   Take 2 mg by mouth every evening.  Dose: 2 mg     traMADol 50 MG Tabs  Commonly known as: Ultram   Take 50 mg by mouth every 6 hours as needed for Mild Pain.  Dose: 50 mg     vitamin D 1000 UNIT Tabs  Commonly known as: Cholecalciferol   Take 1,000 Units by mouth every day.  Dose: 1,000 Units            STOP taking these  "medications      amLODIPine 10 MG Tabs  Commonly known as: Norvasc     benazepril 40 MG tablet  Commonly known as: Lotensin     hydroCHLOROthiazide 12.5 MG tablet     labetalol 100 MG Tabs  Commonly known as: Normodyne     potassium chloride SA 20 MEQ Tbcr  Commonly known as: Kdur              Allergies  Allergies   Allergen Reactions    Escitalopram Diarrhea     Looks like \"chili\"    Metoprolol Diarrhea     Looks like \"Chili\"    Pcn [Penicillins] Rash     > 50 years ago       DIET  Orders Placed This Encounter   Procedures    Diet Order Diet: Cardiac     Standing Status:   Standing     Number of Occurrences:   1     Order Specific Question:   Diet:     Answer:   Cardiac [6]       ACTIVITY  As tolerated.  Weight bearing as tolerated    CONSULTATIONS  None    PROCEDURES  None    LABORATORY  Lab Results   Component Value Date    SODIUM 141 12/13/2023    POTASSIUM 3.6 12/13/2023    CHLORIDE 103 12/13/2023    CO2 23 12/13/2023    GLUCOSE 126 (H) 12/13/2023    BUN 45 (H) 12/13/2023    CREATININE 1.15 12/13/2023    CREATININE 0.96 12/22/2011        Lab Results   Component Value Date    WBC 7.1 12/13/2023    HEMOGLOBIN 14.2 12/13/2023    HEMATOCRIT 39.7 (L) 12/13/2023    PLATELETCT 145 (L) 12/13/2023      No orders to display      Total time of the discharge process exceeds 45 minutes.  "

## 2023-12-14 NOTE — CARE PLAN
The patient is Stable - Low risk of patient condition declining or worsening    Shift Goals  Clinical Goals: Monitor oxygen level to be above 90%  Patient Goals: To go Home  Family Goals: BIANCA    Progress made toward(s) clinical / shift goals:      Problem: Knowledge Deficit - Standard  Goal: Patient and family/care givers will demonstrate understanding of plan of care, disease process/condition, diagnostic tests and medications  Outcome: Progressing     Problem: Respiratory  Goal: Patient will achieve/maintain optimum respiratory ventilation and gas exchange  Outcome: Progressing     Problem: Fall Risk  Goal: Patient will remain free from falls  Outcome: Progressing     Problem: Pain - Standard  Goal: Alleviation of pain or a reduction in pain to the patient’s comfort goal  Outcome: Progressing       Patient is not progressing towards the following goals:

## 2023-12-14 NOTE — PROGRESS NOTES
Patient assigned to 6th floor, room 615. Patient updated on transfer, room number, and time of transfer.   Report given to TERRY Campbell    Patient left via hospital bed with all belongings, including hearing aids, hearing aid , partials (in place), and clothing in belonging bag. Patient left wearing supplemental oxygen 2 L NC and surgical mask per protocol.

## 2023-12-14 NOTE — PROGRESS NOTES
Herber Hamilton has been provided discharge instructions, to include follow up care, home medications, and activity/diet reviewed. Understanding verbalized. IV removed. Catheter tip intact, bleeding controlled. Arm band removed. Medications given from pharmacy. Pt ride present. Pt out of DCL at this time with personal belongings.

## 2023-12-14 NOTE — PROGRESS NOTES
Received patient who came in on a hospital bed, on 2ltrs oxygen per nasal cannula, A & O x 4. Patient oriented to the room. Bed alarm on and call bell given. Personal belongings within reach. Urinal by bedside. Patient denies any pain. All needs met at the moment.

## 2023-12-15 ENCOUNTER — PATIENT OUTREACH (OUTPATIENT)
Dept: MEDICAL GROUP | Facility: MEDICAL CENTER | Age: 78
End: 2023-12-15
Payer: MEDICARE

## 2023-12-15 NOTE — PROGRESS NOTES
Transitional Care Management  TCM Outreach Date and Time: Filed (12/15/2023 10:40 AM)    Discharge Questions  Actual Discharge Date: 12/14/23  Now that you are home, how are you feeling?: Good  Did you receive any new prescriptions?: Yes  Were you able to get them filled?: Yes  Meds to Bed or Pharmacy filled?: Meds to Bed  Do you have any questions about your current medications or new medications (Review Med Rec)?: Yes (Please explain) (BP MEDS WERE STOPPED. HE WILL KEEP A BP LOG AND REVIEW W/ DR DAVIS)  Did you have any durable medical equipment ordered?: No  Do you have a follow up appointment scheduled with your PCP?: Yes  Appointment Date: 12/21/23  Appointment Time: 1300  Any issues or paperwork you wish to discuss with your PCP?: No  Are you (patient) able to get to the appointment?: Yes  If Home Health was ordered, have they contacted you (Patient): Not Applicable  Did you have enough support after your last discharge?: Yes  Does this patient qualify for the CCM program?: No    Transitional Care  Number of attempts made to contact patient: 1  Current or previous attempts competed within two business days of discharge? : Yes  Provided education regarding treatment plan, medications, self-management, ADLs?: Yes  Has patient completed an Advanced Directive?: Yes  Is the patient's advanced directive on file?: Yes  Has the Care Manager's phone number provided?: No  Is there anything else I can help you with?: No    Discharge Summary  Chief Complaint: nausea/diarrhea  Admitting Diagnosis: Acute hypoxemic respiratory failure ([facility]) (J96.01  Discharge Diagnosis: Acute hypoxemic respiratory failure due to COVID-19 ([facility])

## 2023-12-21 ENCOUNTER — OFFICE VISIT (OUTPATIENT)
Dept: MEDICAL GROUP | Facility: MEDICAL CENTER | Age: 78
End: 2023-12-21
Payer: MEDICARE

## 2023-12-21 VITALS
HEART RATE: 68 BPM | DIASTOLIC BLOOD PRESSURE: 68 MMHG | OXYGEN SATURATION: 98 % | TEMPERATURE: 97.8 F | WEIGHT: 175.4 LBS | SYSTOLIC BLOOD PRESSURE: 150 MMHG | BODY MASS INDEX: 25.98 KG/M2 | HEIGHT: 69 IN

## 2023-12-21 DIAGNOSIS — I10 PRIMARY HYPERTENSION: ICD-10-CM

## 2023-12-21 DIAGNOSIS — Z09 HOSPITAL DISCHARGE FOLLOW-UP: Primary | ICD-10-CM

## 2023-12-21 PROBLEM — J96.01 ACUTE HYPOXEMIC RESPIRATORY FAILURE DUE TO COVID-19 (HCC): Status: RESOLVED | Noted: 2023-12-11 | Resolved: 2023-12-21

## 2023-12-21 PROBLEM — N17.9 AKI (ACUTE KIDNEY INJURY) (HCC): Status: RESOLVED | Noted: 2023-12-11 | Resolved: 2023-12-21

## 2023-12-21 PROBLEM — U07.1 ACUTE HYPOXEMIC RESPIRATORY FAILURE DUE TO COVID-19 (HCC): Status: RESOLVED | Noted: 2023-12-11 | Resolved: 2023-12-21

## 2023-12-21 PROCEDURE — 99495 TRANSJ CARE MGMT MOD F2F 14D: CPT | Performed by: STUDENT IN AN ORGANIZED HEALTH CARE EDUCATION/TRAINING PROGRAM

## 2023-12-21 PROCEDURE — 3077F SYST BP >= 140 MM HG: CPT | Performed by: STUDENT IN AN ORGANIZED HEALTH CARE EDUCATION/TRAINING PROGRAM

## 2023-12-21 PROCEDURE — 3078F DIAST BP <80 MM HG: CPT | Performed by: STUDENT IN AN ORGANIZED HEALTH CARE EDUCATION/TRAINING PROGRAM

## 2023-12-21 RX ORDER — BENAZEPRIL HYDROCHLORIDE 40 MG/1
40 TABLET ORAL DAILY
COMMUNITY

## 2023-12-21 RX ORDER — POTASSIUM CHLORIDE 1.5 G/1.58G
10 POWDER, FOR SOLUTION ORAL DAILY
COMMUNITY

## 2023-12-21 RX ORDER — AMLODIPINE BESYLATE 10 MG/1
10 TABLET ORAL DAILY
COMMUNITY

## 2023-12-21 RX ORDER — HYDROCHLOROTHIAZIDE 25 MG/1
25 TABLET ORAL DAILY
COMMUNITY

## 2023-12-21 RX ORDER — LABETALOL 100 MG/1
100 TABLET, FILM COATED ORAL DAILY
COMMUNITY

## 2023-12-21 ASSESSMENT — ENCOUNTER SYMPTOMS
WHEEZING: 0
WEIGHT LOSS: 0
SHORTNESS OF BREATH: 0
HEADACHES: 0
VOMITING: 0
DIZZINESS: 0
PALPITATIONS: 0
FEVER: 0
CHILLS: 0
NAUSEA: 0

## 2023-12-21 ASSESSMENT — FIBROSIS 4 INDEX: FIB4 SCORE: 1.97

## 2023-12-21 NOTE — PROGRESS NOTES
Subjective:     Herber Hamilton is a 78 y.o. male who presents for Hospital Follow-up.    Transitional Care Management  TCM Outreach Date and Time: Filed (12/15/2023 10:40 AM)    Discharge Questions  Actual Discharge Date: 12/14/23  Now that you are home, how are you feeling?: Good  Did you receive any new prescriptions?: Yes  Were you able to get them filled?: Yes  Meds to Bed or Pharmacy filled?: Meds to Bed  Do you have any questions about your current medications or new medications (Review Med Rec)?: Yes (Please explain) (BP MEDS WERE STOPPED. HE WILL KEEP A BP LOG AND REVIEW W/ DR DAVIS)  Did you have any durable medical equipment ordered?: No  Do you have a follow up appointment scheduled with your PCP?: Yes  Appointment Date: 12/21/23  Appointment Time: 1300  Any issues or paperwork you wish to discuss with your PCP?: No  Are you (patient) able to get to the appointment?: Yes  If Home Health was ordered, have they contacted you (Patient): Not Applicable  Did you have enough support after your last discharge?: Yes  Does this patient qualify for the CCM program?: No    Transitional Care  Number of attempts made to contact patient: 1  Current or previous attempts competed within two business days of discharge? : Yes  Provided education regarding treatment plan, medications, self-management, ADLs?: Yes  Has patient completed an Advanced Directive?: Yes  Is the patient's advanced directive on file?: Yes  Has the Care Manager's phone number provided?: No  Is there anything else I can help you with?: No    Discharge Summary  Chief Complaint: nausea/diarrhea  Admitting Diagnosis: Acute hypoxemic respiratory failure ([facility]) (J96.01  Discharge Diagnosis: Acute hypoxemic respiratory failure due to COVID-19 ([facility])    HPI:   History of essential hypertension dyslipidemia, elevated BMI, ALFRED on CPAP, history of elevated PSA (follow[place] [facility] ), RLS, history of PTSD  Macular degeneration - bilateral- [facility]  Divalproex 500mg [facility] by [facility]  Severe scoliosis/ spine [place] on tramadol PRN     Recently hospitalized for   12/11/2023 through 12/14/2023 patient admitted for acute hypoxic respiratory failure secondary to COVID-19 and diarrhea.  Patient report 2 weeks prior to admission developed respiratory symptoms and GI issues.  On admission initially requiring 2 to 3 L of oxygen prior to discharge on room air patient started on steroids.  Patient discharged on Decadron 1 mg daily x 6 days end of treatment 12/20/2023.  - All blood pressure medication discontinued prior to discharge.  Including amlodipine 10 mg daily, benazepril 40 mg daily, hydrochlorothiazide 25 mg daily, potassium chloride 10 meq daily, labetalol 100 mg daily    Today patient reported he is doing well and is close to his baseline.  He reported he is able to do things around the house without shortness of breath and he reported he is doing significant work around the house in preparation for sale and has not had significant issues.           Current medicines (including reconciliation performed today)  Current Outpatient Medications   Medication Sig Dispense Refill    hydroCHLOROthiazide 25 MG Tab Take 25 mg by mouth every day.      potassium chloride (KLOR-CON) 20 MEQ Pack Take 10 mEq by mouth every day.      amLODIPine (NORVASC) 10 MG Tab Take 10 mg by mouth every day.      benazepril (LOTENSIN) 40 MG tablet Take 40 mg by mouth every day.      labetalol (NORMODYNE) 100 MG Tab Take 100 mg by mouth every day.      dexamethasone (DECADRON) 6 MG Tab Take 1 Tablet by mouth every day for 6 days. 6 Tablet 0    traMADol (ULTRAM) 50 MG Tab Take 50 mg by mouth every 6 hours as needed for Mild Pain.      Multiple Vitamins-Minerals (PRESERVISION AREDS 2) Cap Take 1 Capsule by mouth 2 times a day.      divalproex ER (DEPAKOTE ER) 500 MG TABLET SR 24 HR Take 500 mg by mouth every day.      mirtazapine (REMERON) 45 MG tablet Take 45 mg by mouth every evening.    "   omeprazole (PRILOSEC) 40 MG delayed-release capsule Take 40 mg by mouth every day.      terazosin (HYTRIN) 2 MG Cap Take 2 mg by mouth every evening.      cyanocobalamin (VITAMIN B12) 1000 MCG Tab Take 1,000 mcg by mouth every day.      vitamin D (CHOLECALCIFEROL) 1000 UNIT Tab Take 1,000 Units by mouth every day.      ascorbic acid (VITAMIN C) 500 MG tablet Take 500 mg by mouth every day.      atorvastatin (LIPITOR) 10 MG Tab Take 1 Tab by mouth every day. 100 Tab 3     No current facility-administered medications for this visit.       Allergies:   Escitalopram, Metoprolol, and Pcn [penicillins]    Social History     Tobacco Use    Smoking status: Former     Types: Cigarettes     Passive exposure: Never    Smokeless tobacco: Former     Quit date: 1980   Vaping Use    Vaping Use: Never used   Substance Use Topics    Alcohol use: Never     Comment: rarely    Drug use: Never       ROS:  Review of Systems   Constitutional:  Negative for chills, fever and weight loss.   HENT:  Negative for hearing loss.    Respiratory:  Negative for shortness of breath and wheezing.    Cardiovascular:  Negative for chest pain and palpitations.   Gastrointestinal:  Negative for nausea and vomiting.   Genitourinary:  Negative for frequency and urgency.   Skin:  Negative for rash.   Neurological:  Negative for dizziness and headaches.         Objective:     Vitals:    12/21/23 1248   BP: (!) 150/68   BP Location: Left arm   Patient Position: Sitting   Pulse: 68   Temp: 36.6 °C (97.8 °F)   TempSrc: Temporal   SpO2: 98%   Weight: 79.6 kg (175 lb 6.4 oz)   Height: 1.753 m (5' 9\")     Body mass index is 25.9 kg/m².    Physical Exam:  Physical Exam  Constitutional:       Appearance: Normal appearance.   Cardiovascular:      Rate and Rhythm: Normal rate and regular rhythm.   Pulmonary:      Effort: Pulmonary effort is normal.      Breath sounds: Normal breath sounds.   Musculoskeletal:      Cervical back: Normal range of motion and neck " supple.   Lymphadenopathy:      Cervical: No cervical adenopathy.   Neurological:      Mental Status: He is alert.           Assessment and Plan:   1. Hospital discharge follow-up    2. Primary hypertension    Other orders  - hydroCHLOROthiazide 25 MG Tab; Take 25 mg by mouth every day.  - potassium chloride (KLOR-CON) 20 MEQ Pack; Take 10 mEq by mouth every day.  - amLODIPine (NORVASC) 10 MG Tab; Take 10 mg by mouth every day.  - benazepril (LOTENSIN) 40 MG tablet; Take 40 mg by mouth every day.  - labetalol (NORMODYNE) 100 MG Tab; Take 100 mg by mouth every day.      1. Hospital discharge follow-up  2. Primary hypertension  78-year-old male recently discharged from hospital for acute respiratory failure, CINDY, secondary to COVID-19.  At time of presentation patient required 2 to 3 L oxygen and had symptoms of hypotension.  Blood pressure medications were discontinued.  At time of discharge no longer required oxygen supplementation.  - Patient reports he feels his breathing is back to baseline has been able to work around the house and to do housework without any issues or significant shortness of breath.  - Blood pressure is elevated and uncontrolled SBP in the 150s.  Patient denies chest pain shortness of breath dyspnea exertion, headache, focal weakness  - Plan given for reinitiation of blood pressure medication.  Patient verbalized understanding.  - Patient will restart benazepril 20 mg and hydrochlorothiazide 25 mg and potassium 10 mEq daily monitor blood pressure and increase benazepril to 40 mg and start amlodipine 5 mg if blood pressure remains greater than 140 SBP.  If blood pressure remain elevated will increase amlodipine to 10 mg.  Blood pressure remains elevated above 140 [place] start labetalol  - [facility]      - Chart and discharge summary were reviewed.   - Hospitalization and results reviewed with patient.   - Medications reviewed including  instructions regarding high risk medications, dosing and side effects.  - Recommended Services: No services needed at this time  - Advance directive/POLST on file?  Yes    Follow-up:Return in about 1 year (around 12/21/2024).    Face-to-face transitional care management services with [facility] (today's visit is within days post discharge & LACE+ score 59+) medical decision complexity were provided.

## 2024-04-29 ENCOUNTER — TELEPHONE (OUTPATIENT)
Dept: HEALTH INFORMATION MANAGEMENT | Facility: OTHER | Age: 79
End: 2024-04-29
Payer: MEDICARE

## 2024-05-05 ASSESSMENT — PATIENT HEALTH QUESTIONNAIRE - PHQ9
2. FEELING DOWN, DEPRESSED, IRRITABLE, OR HOPELESS: NOT AT ALL
1. LITTLE INTEREST OR PLEASURE IN DOING THINGS: NOT AT ALL

## 2024-05-05 ASSESSMENT — ENCOUNTER SYMPTOMS: GENERAL WELL-BEING: GOOD

## 2024-05-05 ASSESSMENT — ACTIVITIES OF DAILY LIVING (ADL): BATHING_REQUIRES_ASSISTANCE: 0

## 2024-05-06 ASSESSMENT — PATIENT HEALTH QUESTIONNAIRE - PHQ9: CLINICAL INTERPRETATION OF PHQ2 SCORE: 0

## 2024-05-08 ENCOUNTER — OFFICE VISIT (OUTPATIENT)
Dept: FAMILY PLANNING/WOMEN'S HEALTH CLINIC | Facility: PHYSICIAN GROUP | Age: 79
End: 2024-05-08
Attending: FAMILY MEDICINE
Payer: MEDICARE

## 2024-05-08 VITALS
WEIGHT: 175 LBS | BODY MASS INDEX: 25.92 KG/M2 | HEIGHT: 69 IN | DIASTOLIC BLOOD PRESSURE: 78 MMHG | SYSTOLIC BLOOD PRESSURE: 132 MMHG

## 2024-05-08 DIAGNOSIS — H35.3230 BILATERAL EXUDATIVE AGE-RELATED MACULAR DEGENERATION, UNSPECIFIED STAGE (HCC): ICD-10-CM

## 2024-05-08 DIAGNOSIS — I10 PRIMARY HYPERTENSION: ICD-10-CM

## 2024-05-08 DIAGNOSIS — M54.50 CHRONIC BILATERAL LOW BACK PAIN WITHOUT SCIATICA: ICD-10-CM

## 2024-05-08 DIAGNOSIS — E78.2 MIXED HYPERLIPIDEMIA: ICD-10-CM

## 2024-05-08 DIAGNOSIS — D69.6 THROMBOCYTOPENIA (HCC): ICD-10-CM

## 2024-05-08 DIAGNOSIS — G31.9 CEREBRAL ATROPHY (HCC): ICD-10-CM

## 2024-05-08 DIAGNOSIS — F43.10 POSTTRAUMATIC STRESS DISORDER: ICD-10-CM

## 2024-05-08 DIAGNOSIS — G89.29 CHRONIC BILATERAL LOW BACK PAIN WITHOUT SCIATICA: ICD-10-CM

## 2024-05-08 DIAGNOSIS — F11.20 NARCOTIC DEPENDENCE (HCC): ICD-10-CM

## 2024-05-08 DIAGNOSIS — N40.1 BENIGN PROSTATIC HYPERPLASIA WITH LOWER URINARY TRACT SYMPTOMS, SYMPTOM DETAILS UNSPECIFIED: ICD-10-CM

## 2024-05-08 DIAGNOSIS — F33.3 SEVERE EPISODE OF RECURRENT MAJOR DEPRESSIVE DISORDER, WITH PSYCHOTIC FEATURES (HCC): ICD-10-CM

## 2024-05-08 PROCEDURE — 3075F SYST BP GE 130 - 139MM HG: CPT

## 2024-05-08 PROCEDURE — G0439 PPPS, SUBSEQ VISIT: HCPCS

## 2024-05-08 PROCEDURE — 1126F AMNT PAIN NOTED NONE PRSNT: CPT

## 2024-05-08 PROCEDURE — 3078F DIAST BP <80 MM HG: CPT

## 2024-05-08 SDOH — ECONOMIC STABILITY: INCOME INSECURITY: IN THE LAST 12 MONTHS, WAS THERE A TIME WHEN YOU WERE NOT ABLE TO PAY THE MORTGAGE OR RENT ON TIME?: NO

## 2024-05-08 SDOH — ECONOMIC STABILITY: FOOD INSECURITY: WITHIN THE PAST 12 MONTHS, THE FOOD YOU BOUGHT JUST DIDN'T LAST AND YOU DIDN'T HAVE MONEY TO GET MORE.: NEVER TRUE

## 2024-05-08 SDOH — ECONOMIC STABILITY: HOUSING INSECURITY: IN THE LAST 12 MONTHS, HOW MANY PLACES HAVE YOU LIVED?: 1

## 2024-05-08 SDOH — ECONOMIC STABILITY: FOOD INSECURITY: WITHIN THE PAST 12 MONTHS, YOU WORRIED THAT YOUR FOOD WOULD RUN OUT BEFORE YOU GOT MONEY TO BUY MORE.: NEVER TRUE

## 2024-05-08 SDOH — ECONOMIC STABILITY: TRANSPORTATION INSECURITY
IN THE PAST 12 MONTHS, HAS LACK OF TRANSPORTATION KEPT YOU FROM MEETINGS, WORK, OR FROM GETTING THINGS NEEDED FOR DAILY LIVING?: NO

## 2024-05-08 SDOH — ECONOMIC STABILITY: HOUSING INSECURITY
IN THE LAST 12 MONTHS, WAS THERE A TIME WHEN YOU DID NOT HAVE A STEADY PLACE TO SLEEP OR SLEPT IN A SHELTER (INCLUDING NOW)?: NO

## 2024-05-08 SDOH — ECONOMIC STABILITY: INCOME INSECURITY: HOW HARD IS IT FOR YOU TO PAY FOR THE VERY BASICS LIKE FOOD, HOUSING, MEDICAL CARE, AND HEATING?: NOT HARD AT ALL

## 2024-05-08 SDOH — ECONOMIC STABILITY: TRANSPORTATION INSECURITY
IN THE PAST 12 MONTHS, HAS THE LACK OF TRANSPORTATION KEPT YOU FROM MEDICAL APPOINTMENTS OR FROM GETTING MEDICATIONS?: NO

## 2024-05-08 ASSESSMENT — FIBROSIS 4 INDEX: FIB4 SCORE: 1.97

## 2024-05-08 ASSESSMENT — PAIN SCALES - GENERAL: PAINLEVEL: NO PAIN

## 2024-05-08 NOTE — ASSESSMENT & PLAN NOTE
Chronic, stable. The patient reports that he has had lower back pain for many years. The patient is being followed by [facility].  He is taking opioids with good relied.  Continue with current defined treatment plan: traMADol (ULTRAM) 50 MG Tab . Follow-up at least annually.

## 2024-05-08 NOTE — ASSESSMENT & PLAN NOTE
Chronic, stable. The patient's blood pressure is well controlled with current medication. Continue with current defined treatment plan: labetalol (NORMODYNE) 100 MG Tab, hydroCHLOROthiazide 25 MG Tab, benazepril (LOTENSIN) 40 MG tablet, amLODIPine (NORVASC) 10 MG Tab . Follow-up at least annually.

## 2024-05-08 NOTE — PROGRESS NOTES
Comprehensive Health Assessment Program     Herber Hamilton is a 78 y.o. here for his comprehensive health assessment.    Patient Active Problem List    Diagnosis Date Noted    Chronic low back pain 05/08/2024    Cerebral atrophy (HCC) 05/08/2024    Thrombocytopenia (HCC) 05/08/2024    Hypophosphatemia 12/13/2023    ACP (advance care planning) 12/11/2023    BMI 26.0-26.9,adult 08/29/2023    Exudative age-related macular degeneration (HCC) 08/29/2023    Narcotic dependence (HCC) 08/29/2023    Chronic pain syndrome 08/21/2023    Scoliosis 07/19/2023    Elevated PSA 05/07/2019    Severe episode of recurrent major depressive disorder, with psychotic features (HCC) 05/07/2018    PTSD (post-traumatic stress disorder) 05/07/2018    Benign prostatic hyperplasia with lower urinary tract symptoms 11/06/2017    ALFRED on CPAP 08/19/2016    RLS (restless legs syndrome) 06/01/2015    History of GI bleed 05/09/2013    Colon polyp 05/02/2011    Hyperlipidemia 05/02/2011    Hypertension 01/12/2010       Current Outpatient Medications   Medication Sig Dispense Refill    hydroCHLOROthiazide 25 MG Tab Take 25 mg by mouth every day.      potassium chloride (KLOR-CON) 20 MEQ Pack Take 10 mEq by mouth every day.      amLODIPine (NORVASC) 10 MG Tab Take 10 mg by mouth every day.      benazepril (LOTENSIN) 40 MG tablet Take 40 mg by mouth every day.      labetalol (NORMODYNE) 100 MG Tab Take 100 mg by mouth every day.      traMADol (ULTRAM) 50 MG Tab Take 50 mg by mouth every 6 hours as needed for Mild Pain.      Multiple Vitamins-Minerals (PRESERVISION AREDS 2) Cap Take 1 Capsule by mouth 2 times a day.      divalproex ER (DEPAKOTE ER) 500 MG TABLET SR 24 HR Take 500 mg by mouth every day.      mirtazapine (REMERON) 45 MG tablet Take 45 mg by mouth every evening.      omeprazole (PRILOSEC) 40 MG delayed-release capsule Take 40 mg by mouth every day.      terazosin (HYTRIN) 2 MG Cap Take 2 mg by mouth every evening.      cyanocobalamin  (VITAMIN B12) 1000 MCG Tab Take 1,000 mcg by mouth every day.      vitamin D (CHOLECALCIFEROL) 1000 UNIT Tab Take 1,000 Units by mouth every day.      ascorbic acid (VITAMIN C) 500 MG tablet Take 500 mg by mouth every day.      atorvastatin (LIPITOR) 10 MG Tab Take 1 Tab by mouth every day. 100 Tab 3     No current facility-administered medications for this visit.          Current supplements as per medication list.     Allergies:   Escitalopram, Metoprolol, and Pcn [penicillins]  Social History     Tobacco Use    Smoking status: Former     Types: Cigarettes     Passive exposure: Never    Smokeless tobacco: Former     Quit date: 1980   Vaping Use    Vaping Use: Never used   Substance Use Topics    Alcohol use: Never     Comment: rarely    Drug use: Never     Family History   Problem Relation Age of Onset    Non-contributory Mother         unknown    Heart Disease Father     Arthritis Neg Hx     Lung Disease Neg Hx     Genetic Disorder Neg Hx     Cancer Neg Hx     Psychiatric Illness Neg Hx     Diabetes Neg Hx     Hypertension Neg Hx     Hyperlipidemia Neg Hx     Stroke Neg Hx     Alcohol/Drug Neg Hx      Herber  has a past medical history of Depression (1/12/2010), Dyslipidemia, HTN (hypertension) (1/12/2010), Hypertension, Sleep apnea, and UTI (urinary tract infection).   Past Surgical History:   Procedure Laterality Date    GASTROSCOPY-ENDO  3/14/2012    Performed by MELANIA OROZCO at ENDOSCOPY [facility] ORS    GASTROSCOPY-ENDO  3/13/2012    Performed by KARISHMA NEWMAN at ENDOSCOPY [facility] ORS    TRANS URETHRAL RESECTION PROSTATE  3/19/2009    Performed by SAI AKERS at SURGERY [facility] ORS    OTHER      PROSTATE SURGERY    TONSILLECTOMY         Screening:  In the last six months have you experienced any leakage of urine? Yes    Depression Screening  Little interest or pleasure in doing things?  0 - not at all  Feeling down, depressed , or hopeless? 0 - not at all  Patient Health  Questionnaire Score: 0     If depressive symptoms identified deferred to follow up visit unless specifically addressed in assessment and plan.    Interpretation of PHQ-9 Total Score   Score Severity   1-4 No Depression   5-9 Mild Depression   10-14 Moderate Depression   15-19 Moderately Severe Depression   20-27 Severe Depression    Screening for Cognitive Impairment  Do you or any of your friends or family members have any concern about your memory? No  Three Minute Recall (Leader, Season, Table) 1/3    Lan clock face with all 12 numbers and set the hands to show 10 minutes after 11.  Yes 5/5  Cognitive concerns identified deferred for follow up unless specifically addressed in assessment and plan.    Fall Risk Assessment  Has the patient had two or more falls in the last year or any fall with injury in the last year?  No    Safety Assessment  Do you always wear your seatbelt?  Yes  Any changes to home needed to function safely? No  Difficulty hearing.  Yes  Patient counseled about all safety risks that were identified.    Functional Assessment ADLs  Are there any barriers preventing you from cooking for yourself or meeting nutritional needs?  No.    Are there any barriers preventing you from driving safely or obtaining transportation?  No.    Are there any barriers preventing you from using a telephone or calling for help?  No    Are there any barriers preventing you from shopping?  No.    Are there any barriers preventing you from taking care of your own finances?  No    Are there any barriers preventing you from managing your medications?  No    Are there any barriers preventing you from showering, bathing or dressing yourself? No    Are there any barriers preventing you from doing housework or laundry? No  Are there any barriers preventing you from using the toilet?No  Are you currently engaging in any exercise or physical activity?  Yes.      Self-Assessment of Health  What is your perception of your health?  Good  Do you sleep more than six hours a night? Yes  In the past 7 days, how much did pain keep you from doing your normal work? Some  Do you spend quality time with family or friends (virtually or in person)? Yes  Do you usually eat a heart healthy diet that constists of a variety of fruits, vegetables, whole grains and fiber? No  Do you eat foods high in fat and/or Fast Food more than three times per week? No    Advance Care Planning  Do you have an Advance Directive, Living Will, Durable Power of , or POLST? Yes          is not on file - instructed patient to bring in a copy to scan into their chart      Health Maintenance Summary            Overdue - Hepatitis C Screening (Once) Never done      No completion history exists for this topic.              Overdue - COVID-19 Vaccine (3 - 2023-24 season) Overdue since 9/1/2023 03/26/2021  Imm Admin: MODERNA SARS-COV-2 VACCINE (12+)    02/26/2021  Imm Admin: MODERNA SARS-COV-2 VACCINE (12+)              Annual Wellness Visit (Yearly) Next due on 5/8/2025 05/08/2024  Level of Service: ANNUAL WELLNESS VISIT-INCLUDES PPPS SUBSEQUE*    08/29/2023  Level of Service: ANNUAL WELLNESS VISIT-INCLUDES PPPS SUBSEQUE*    07/07/2020  Subsequent Annual Wellness Visit - Includes PPPS ()    07/07/2020  Visit Dx: Medicare annual wellness visit, subsequent    05/07/2019  Visit Dx: Medicare annual wellness visit, subsequent    Only the first 5 history entries have been loaded, but more history exists.              IMM DTaP/Tdap/Td Vaccine (3 - Td or Tdap) Next due on 7/19/2033 07/19/2023  Imm Admin: Tdap Vaccine    05/09/2013  Imm Admin: Tdap Vaccine              Pneumococcal Vaccine: 65+ Years (Series Information) Completed      01/11/2019  Imm Admin: Pneumococcal polysaccharide vaccine (PPSV-23)    06/03/2016  Imm Admin: Pneumococcal Conjugate Vaccine (Prevnar/PCV-13)    11/03/2014  Imm Admin: Pneumococcal Conjugate Vaccine (Prevnar/PCV-13)    06/07/2011   Imm Admin: Pneumococcal polysaccharide vaccine (PPSV-23)    06/07/2011  Imm Admin: Pneumococcal Vaccine (UF) - HISTORICAL DATA    Only the first 5 history entries have been loaded, but more history exists.              Zoster (Shingles) Vaccines (Series Information) Completed      04/11/2019  Imm Admin: Zoster Vaccine Recombinant (RZV) (SHINGRIX)    02/07/2019  Imm Admin: Zoster Vaccine Recombinant (RZV) (SHINGRIX)    12/05/2018  Imm Admin: Zoster Vaccine Recombinant (RZV) (SHINGRIX)    10/27/2016  Imm Admin: Zoster Vaccine Live (ZVL) (Zostavax) - HISTORICAL DATA              Influenza Vaccine (Series Information) Completed      09/15/2023  Imm Admin: Influenza Vaccine Adult HD    11/15/2022  Imm Admin: Influenza Vaccine Quad Inj (Pf)    10/06/2021  Imm Admin: Influenza Vaccine Quad Inj (Pf)    09/23/2020  Imm Admin: Influenza Vaccine Quad Inj (Pf)    10/23/2019  Imm Admin: Influenza, Unspecified - HISTORICAL DATA    Only the first 5 history entries have been loaded, but more history exists.              Hepatitis A Vaccine (Hep A) (Series Information) Aged Out      No completion history exists for this topic.              Hepatitis B Vaccine (Hep B) (Series Information) Aged Out      No completion history exists for this topic.              HPV Vaccines (Series Information) Aged Out      No completion history exists for this topic.              Polio Vaccine (Inactivated Polio) (Series Information) Aged Out      No completion history exists for this topic.              Meningococcal Immunization (Series Information) Aged Out      No completion history exists for this topic.              Discontinued - Colorectal Cancer Screening  Discontinued        Frequency changed to Never automatically (Topic No Longer Applies)    07/16/2018  REFERRAL TO GI FOR COLONOSCOPY    05/22/2015  AMB REFERRAL TO GI FOR COLONOSCOPY                    Patient Care Team:  Donald Pelletier M.D. as PCP - General (Internal Medicine)  Brayden  "ARNULFO Dobbins as PCP - [facility] Paneled  Alexandro Ortez M.D. as Consulting Physician (Gastroenterology)  JORDYN Tracey (Inactive) as Consulting Physician (Pulmonary Medicine)  Jake Nolasco M.D. as Consulting Physician (Internal Medicine)  Mookie Jack M.D. (Inactive) as Consulting Physician (Phys Med and Rehab)  Stephane Tyler Ass't as        Financial Resource Strain: Low Risk  (5/8/2024)    Overall Financial Resource Strain (CARDIA)     Difficulty of Paying Living Expenses: Not hard at all      Transportation Needs: No Transportation Needs (5/8/2024)    PRAPARE - Transportation     Lack of Transportation (Medical): No     Lack of Transportation (Non-Medical): No      Food Insecurity: No Food Insecurity (5/8/2024)    Hunger Vital Sign     Worried About Running Out of Food in the Last Year: Never true     Ran Out of Food in the Last Year: Never true        Encounter Vitals  Blood Pressure : 132/78  O2 Delivery Device: None - Room Air  Weight: 79.4 kg (175 lb)  Height: 175.3 cm (5' 9\")  BMI (Calculated): 25.84  Pain Score: No pain  DME  O2 Delivery Device: None - Room Air     Alert, oriented in no acute distress.  Eye contact is good, speech goal directed, affect calm.    Assessment and Plan. The following treatment and monitoring plan is recommended:  Exudative age-related macular degeneration (HCC)  Chronic, stable. The [facility]. He has been getting bilateral eye injections.  Follow-up at least annually.      Severe episode of recurrent major depressive disorder, with psychotic features (HCC)  Chronic, stable.  PHQ-9 score today is 0. The patient reports good mood most of the time. He denies SI/HI.  He reports that his symptoms are well controlled with current medications.   Continue with current defined treatment plan: mirtazapine (REMERON) 45 MG tablet, divalproex ER (DEPAKOTE ER) 500 MG TABLET SR 24 HR . " "Follow-up at least annually.      Benign prostatic hyperplasia with lower urinary tract symptoms  Chronic, stable.  [facility]  The patient reports that his symptoms are much improved with current medication.   Continue with current defined treatment plan: terazosin (HYTRIN) 2 MG Cap . Follow-up at least annually.      Chronic low back pain  Chronic, stable. The patient reports that he has had lower back pain for many years. The patient is being followed by [facility].  He is taking opioids with good relied.  Continue with current defined treatment plan: traMADol (ULTRAM) 50 MG Tab . Follow-up at least annually.      Hyperlipidemia  Chronic, stable. The patient denies any side effects from the current medication. Continue with current defined treatment plan: atorvastatin (LIPITOR) 10 MG Tab . Follow-up at least annually.      Hypertension  Chronic, stable. The patient's blood pressure is well controlled with current medication. Continue with current defined treatment plan: labetalol (NORMODYNE) 100 MG Tab, hydroCHLOROthiazide 25 MG Tab, benazepril (LOTENSIN) 40 MG tablet, amLODIPine (NORVASC) 10 MG Tab . Follow-up at least annually.      PTSD (post-traumatic stress disorder)  Chronic, stable.  The patient reports good mood most of the time. He denies SI/HI.  He reports that his symptoms are well controlled with current medications.   Continue with current defined treatment plan: mirtazapine (REMERON) 45 MG tablet, divalproex ER (DEPAKOTE ER) 500 MG TABLET SR 24 HR . Follow-up at least annually.     Thrombocytopenia (HCC)  Chronic, stable.  Last three platelet values: 145, 113, and 136. The patient denies any signs or symptoms of bleeding or easy bruising.  Continue with current defined treatment plan: surveillance. Follow-up at least annually.      Cerebral atrophy (HCC)  Chronic, stable. 11/10/17 CT Head: \"There is a pattern of cerebral atrophy manifest as enlargement of sulcal markings and " "ventricular prominence\". No current treatment. The patient denies memory problems or cognitive decline. Follow-up at least annually.      Narcotic dependence (HCC)  Chronic, stable. The patient reports that he has had lower back pain for many years. The patient is being followed by [facility].  He is taking opioids with good relied.  Continue with current defined treatment plan: traMADol (ULTRAM) 50 MG Tab . Follow-up at least annually.       Services suggested: No services needed at this time  Health Care Screening: Age-appropriate preventive services recommended by USPTF and ACIP covered by Medicare were discussed today. Services ordered if indicated and agreed upon by the patient.  Referrals offered: Community-based lifestyle interventions to reduce health risks and promote self-management and wellness, fall prevention, nutrition, physical activity, tobacco-use cessation, weight loss, and mental health services as per orders if indicated.    Discussion today about general wellness and lifestyle habits:    Prevent falls and reduce trip hazards; Cautioned about securing or removing rugs.  Have a working fire alarm and carbon monoxide detector.  Engage in regular physical activity and social activities.    Follow-up: Return for appointment with Primary Care Provider as needed.         "

## 2024-05-08 NOTE — ASSESSMENT & PLAN NOTE
Chronic, stable. The patient is being [facility]. He has been getting bilateral eye injections.  Follow-up at least annually.

## 2024-05-08 NOTE — ASSESSMENT & PLAN NOTE
Chronic, stable.  The patient reports good mood most of the time. He denies SI/HI.  He reports that his symptoms are well controlled with current medications.   Continue with current defined treatment plan: mirtazapine (REMERON) 45 MG tablet, divalproex ER (DEPAKOTE ER) 500 MG TABLET SR 24 HR . Follow-up at least annually.

## 2024-05-08 NOTE — ASSESSMENT & PLAN NOTE
Chronic, stable.  Last three platelet values: 145, 113, and 136. The patient denies any signs or symptoms of bleeding or easy bruising.  Continue with current defined treatment plan: surveillance. Follow-up at least annually.

## 2024-05-08 NOTE — ASSESSMENT & PLAN NOTE
Chronic, stable.  [facility].  The patient reports that his symptoms are much improved with current medication.   Continue with current defined treatment plan: terazosin (HYTRIN) 2 MG Cap . Follow-up at least annually.

## 2024-05-08 NOTE — ASSESSMENT & PLAN NOTE
"Chronic, stable. 11/10/17 CT Head: \"There is a pattern of cerebral atrophy manifest as enlargement of sulcal markings and ventricular prominence\". No current treatment. The patient denies memory problems or cognitive decline. Follow-up at least annually.    "

## 2024-05-08 NOTE — ASSESSMENT & PLAN NOTE
Chronic, stable.  PHQ-9 score today is 0. The patient reports good mood most of the time. He denies SI/HI.  He reports that his symptoms are well controlled with current medications.   Continue with current defined treatment plan: mirtazapine (REMERON) 45 MG tablet, divalproex ER (DEPAKOTE ER) 500 MG TABLET SR 24 HR . Follow-up at least annually.

## 2024-07-22 SDOH — ECONOMIC STABILITY: FOOD INSECURITY: WITHIN THE PAST 12 MONTHS, THE FOOD YOU BOUGHT JUST DIDN'T LAST AND YOU DIDN'T HAVE MONEY TO GET MORE.: NEVER TRUE

## 2024-07-22 SDOH — ECONOMIC STABILITY: INCOME INSECURITY: HOW HARD IS IT FOR YOU TO PAY FOR THE VERY BASICS LIKE FOOD, HOUSING, MEDICAL CARE, AND HEATING?: NOT VERY HARD

## 2024-07-22 SDOH — ECONOMIC STABILITY: INCOME INSECURITY: IN THE LAST 12 MONTHS, WAS THERE A TIME WHEN YOU WERE NOT ABLE TO PAY THE MORTGAGE OR RENT ON TIME?: NO

## 2024-07-22 SDOH — ECONOMIC STABILITY: HOUSING INSECURITY: IN THE LAST 12 MONTHS, HOW MANY PLACES HAVE YOU LIVED?: 1

## 2024-07-22 SDOH — ECONOMIC STABILITY: TRANSPORTATION INSECURITY
IN THE PAST 12 MONTHS, HAS LACK OF RELIABLE TRANSPORTATION KEPT YOU FROM MEDICAL APPOINTMENTS, MEETINGS, WORK OR FROM GETTING THINGS NEEDED FOR DAILY LIVING?: NO

## 2024-07-22 SDOH — HEALTH STABILITY: PHYSICAL HEALTH: ON AVERAGE, HOW MANY MINUTES DO YOU ENGAGE IN EXERCISE AT THIS LEVEL?: PATIENT DECLINED

## 2024-07-22 SDOH — ECONOMIC STABILITY: FOOD INSECURITY: WITHIN THE PAST 12 MONTHS, YOU WORRIED THAT YOUR FOOD WOULD RUN OUT BEFORE YOU GOT MONEY TO BUY MORE.: NEVER TRUE

## 2024-07-22 SDOH — HEALTH STABILITY: PHYSICAL HEALTH
ON AVERAGE, HOW MANY DAYS PER WEEK DO YOU ENGAGE IN MODERATE TO STRENUOUS EXERCISE (LIKE A BRISK WALK)?: PATIENT DECLINED

## 2024-07-22 SDOH — HEALTH STABILITY: MENTAL HEALTH
STRESS IS WHEN SOMEONE FEELS TENSE, NERVOUS, ANXIOUS, OR CAN'T SLEEP AT NIGHT BECAUSE THEIR MIND IS TROUBLED. HOW STRESSED ARE YOU?: NOT AT ALL

## 2024-07-22 ASSESSMENT — LIFESTYLE VARIABLES
AUDIT-C TOTAL SCORE: 0
HOW OFTEN DO YOU HAVE SIX OR MORE DRINKS ON ONE OCCASION: NEVER
HOW OFTEN DO YOU HAVE A DRINK CONTAINING ALCOHOL: NEVER
SKIP TO QUESTIONS 9-10: 1
HOW MANY STANDARD DRINKS CONTAINING ALCOHOL DO YOU HAVE ON A TYPICAL DAY: PATIENT DOES NOT DRINK

## 2024-07-22 ASSESSMENT — SOCIAL DETERMINANTS OF HEALTH (SDOH)
IN A TYPICAL WEEK, HOW MANY TIMES DO YOU TALK ON THE PHONE WITH FAMILY, FRIENDS, OR NEIGHBORS?: NEVER
HOW OFTEN DO YOU ATTEND CHURCH OR RELIGIOUS SERVICES?: NEVER
HOW OFTEN DO YOU ATTEND CHURCH OR RELIGIOUS SERVICES?: NEVER
HOW OFTEN DO YOU HAVE SIX OR MORE DRINKS ON ONE OCCASION: NEVER
HOW OFTEN DO YOU GET TOGETHER WITH FRIENDS OR RELATIVES?: PATIENT DECLINED
HOW HARD IS IT FOR YOU TO PAY FOR THE VERY BASICS LIKE FOOD, HOUSING, MEDICAL CARE, AND HEATING?: NOT VERY HARD
DO YOU BELONG TO ANY CLUBS OR ORGANIZATIONS SUCH AS CHURCH GROUPS UNIONS, FRATERNAL OR ATHLETIC GROUPS, OR SCHOOL GROUPS?: NO
HOW OFTEN DO YOU ATTENT MEETINGS OF THE CLUB OR ORGANIZATION YOU BELONG TO?: NEVER
IN THE PAST 12 MONTHS, HAS THE ELECTRIC, GAS, OIL, OR WATER COMPANY THREATENED TO SHUT OFF SERVICE IN YOUR HOME?: NO
HOW OFTEN DO YOU ATTENT MEETINGS OF THE CLUB OR ORGANIZATION YOU BELONG TO?: NEVER
IN A TYPICAL WEEK, HOW MANY TIMES DO YOU TALK ON THE PHONE WITH FAMILY, FRIENDS, OR NEIGHBORS?: NEVER
DO YOU BELONG TO ANY CLUBS OR ORGANIZATIONS SUCH AS CHURCH GROUPS UNIONS, FRATERNAL OR ATHLETIC GROUPS, OR SCHOOL GROUPS?: NO
HOW OFTEN DO YOU GET TOGETHER WITH FRIENDS OR RELATIVES?: PATIENT DECLINED
HOW MANY DRINKS CONTAINING ALCOHOL DO YOU HAVE ON A TYPICAL DAY WHEN YOU ARE DRINKING: PATIENT DOES NOT DRINK
HOW OFTEN DO YOU HAVE A DRINK CONTAINING ALCOHOL: NEVER
WITHIN THE PAST 12 MONTHS, YOU WORRIED THAT YOUR FOOD WOULD RUN OUT BEFORE YOU GOT THE MONEY TO BUY MORE: NEVER TRUE

## 2024-07-23 ENCOUNTER — OFFICE VISIT (OUTPATIENT)
Dept: MEDICAL GROUP | Facility: MEDICAL CENTER | Age: 79
End: 2024-07-23
Payer: MEDICARE

## 2024-07-23 VITALS
DIASTOLIC BLOOD PRESSURE: 60 MMHG | TEMPERATURE: 97.6 F | WEIGHT: 184.08 LBS | HEART RATE: 69 BPM | SYSTOLIC BLOOD PRESSURE: 138 MMHG | HEIGHT: 69 IN | BODY MASS INDEX: 27.27 KG/M2 | OXYGEN SATURATION: 96 %

## 2024-07-23 DIAGNOSIS — Z00.00 MEDICARE ANNUAL WELLNESS VISIT, SUBSEQUENT: Primary | ICD-10-CM

## 2024-07-23 PROBLEM — M10.9 GOUT: Status: ACTIVE | Noted: 2024-07-23

## 2024-07-23 PROCEDURE — 3078F DIAST BP <80 MM HG: CPT | Performed by: STUDENT IN AN ORGANIZED HEALTH CARE EDUCATION/TRAINING PROGRAM

## 2024-07-23 PROCEDURE — G0439 PPPS, SUBSEQ VISIT: HCPCS | Performed by: STUDENT IN AN ORGANIZED HEALTH CARE EDUCATION/TRAINING PROGRAM

## 2024-07-23 PROCEDURE — 3075F SYST BP GE 130 - 139MM HG: CPT | Performed by: STUDENT IN AN ORGANIZED HEALTH CARE EDUCATION/TRAINING PROGRAM

## 2024-07-23 RX ORDER — COLCHICINE 0.6 MG/1
0.6 TABLET ORAL
COMMUNITY
Start: 2024-06-26

## 2024-07-23 RX ORDER — ALLOPURINOL 300 MG/1
450 TABLET ORAL DAILY
COMMUNITY
Start: 2024-06-26

## 2024-07-23 ASSESSMENT — ACTIVITIES OF DAILY LIVING (ADL): BATHING_REQUIRES_ASSISTANCE: 0

## 2024-07-23 ASSESSMENT — FIBROSIS 4 INDEX: FIB4 SCORE: 1.97

## 2024-07-23 ASSESSMENT — ENCOUNTER SYMPTOMS: GENERAL WELL-BEING: GOOD

## 2024-07-23 ASSESSMENT — PATIENT HEALTH QUESTIONNAIRE - PHQ9: CLINICAL INTERPRETATION OF PHQ2 SCORE: 0

## 2024-12-22 ENCOUNTER — OFFICE VISIT (OUTPATIENT)
Dept: URGENT CARE | Facility: PHYSICIAN GROUP | Age: 79
End: 2024-12-22
Payer: MEDICARE

## 2024-12-22 VITALS
HEIGHT: 68 IN | SYSTOLIC BLOOD PRESSURE: 130 MMHG | OXYGEN SATURATION: 96 % | HEART RATE: 68 BPM | RESPIRATION RATE: 18 BRPM | DIASTOLIC BLOOD PRESSURE: 70 MMHG | TEMPERATURE: 98.3 F | WEIGHT: 175 LBS | BODY MASS INDEX: 26.52 KG/M2

## 2024-12-22 DIAGNOSIS — B96.89 ACUTE BACTERIAL SINUSITIS: ICD-10-CM

## 2024-12-22 DIAGNOSIS — J01.90 ACUTE BACTERIAL SINUSITIS: ICD-10-CM

## 2024-12-22 PROCEDURE — 99213 OFFICE O/P EST LOW 20 MIN: CPT

## 2024-12-22 PROCEDURE — 3078F DIAST BP <80 MM HG: CPT

## 2024-12-22 PROCEDURE — 3075F SYST BP GE 130 - 139MM HG: CPT

## 2024-12-22 RX ORDER — DOXYCYCLINE HYCLATE 100 MG
100 TABLET ORAL 2 TIMES DAILY
Qty: 14 TABLET | Refills: 0 | Status: SHIPPED | OUTPATIENT
Start: 2024-12-22 | End: 2024-12-29

## 2024-12-22 ASSESSMENT — ENCOUNTER SYMPTOMS
CHILLS: 0
NAUSEA: 0
SHORTNESS OF BREATH: 0
SORE THROAT: 0
FEVER: 0
DIARRHEA: 0
COUGH: 1
MYALGIAS: 0
ABDOMINAL PAIN: 0
HEADACHES: 0
VOMITING: 0

## 2024-12-22 ASSESSMENT — FIBROSIS 4 INDEX: FIB4 SCORE: 2

## 2024-12-22 NOTE — PROGRESS NOTES
"Subjective:   Herber Hamilton is a 79 y.o. male who presents for Congestion (X4 weeks off and on )      Sinus Problem  This is a new problem. The current episode started 1 to 4 weeks ago. The problem has been waxing and waning since onset. Associated symptoms include congestion and coughing. Pertinent negatives include no chills, ear pain, headaches, shortness of breath or sore throat.       Review of Systems   Constitutional:  Negative for chills, fever and malaise/fatigue.   HENT:  Positive for congestion. Negative for ear pain, hearing loss and sore throat.    Respiratory:  Positive for cough. Negative for shortness of breath.    Cardiovascular:  Negative for chest pain.   Gastrointestinal:  Negative for abdominal pain, diarrhea, nausea and vomiting.   Genitourinary:  Negative for dysuria.   Musculoskeletal:  Negative for myalgias.   Skin:  Negative for rash.   Neurological:  Negative for headaches.       Medications, Allergies, and current problem list reviewed today in Epic.     Objective:     /70 (BP Location: Right arm, Patient Position: Sitting, BP Cuff Size: Adult)   Pulse 68   Temp 36.8 °C (98.3 °F) (Temporal)   Resp 18   Ht 1.727 m (5' 8\")   Wt 79.4 kg (175 lb)   SpO2 96%     Physical Exam  Vitals and nursing note reviewed.   Constitutional:       General: He is not in acute distress.     Appearance: Normal appearance. He is not ill-appearing.   HENT:      Head: Normocephalic and atraumatic.      Right Ear: Tympanic membrane normal.      Left Ear: Tympanic membrane normal.      Nose: Congestion present. No rhinorrhea.      Right Turbinates: Enlarged.      Left Turbinates: Enlarged.      Mouth/Throat:      Mouth: Mucous membranes are moist.      Pharynx: Oropharynx is clear. Uvula midline. Postnasal drip present. No pharyngeal swelling, oropharyngeal exudate, posterior oropharyngeal erythema or uvula swelling.      Tonsils: No tonsillar exudate or tonsillar abscesses.   Eyes:      " Conjunctiva/sclera: Conjunctivae normal.      Pupils: Pupils are equal, round, and reactive to light.   Cardiovascular:      Rate and Rhythm: Normal rate.      Heart sounds: Normal heart sounds.   Pulmonary:      Effort: Pulmonary effort is normal. No respiratory distress.      Breath sounds: Normal breath sounds. No stridor. No wheezing or rhonchi.   Abdominal:      General: Abdomen is flat.      Palpations: Abdomen is soft.   Skin:     General: Skin is warm and dry.      Capillary Refill: Capillary refill takes less than 2 seconds.   Neurological:      Mental Status: He is alert and oriented to person, place, and time.   Psychiatric:         Mood and Affect: Mood normal.         Behavior: Behavior normal.         Assessment/Plan:       1. Acute bacterial sinusitis  doxycycline (VIBRAMYCIN) 100 MG Tab        After assessment it does appear the patient has possible sinusitis.  Patient has been using saline sprays at home with minimal relief.  At this time I did provide patient prescription for doxycycline.  Patient instructed take as prescribed.  Patient was also instructed to start his Flonase spray along with nondrowsy antihistamine and possible sinus rinses.  Patient instructed to monitor for any worsening signs and symptoms of any other concerns patient was instructed to return to urgent care for reevaluation.    Take full course of antibiotic  Tylenol and Motrin for fever and pain  OTC meds as discussed including oral decongestant if tolerated  Increase fluids and rest  Nasal spray, nasal rinse/wash, gary pot    Differential diagnosis, natural history, and supportive care discussed. We also reviewed side effects of medication including allergic response, GI upset, tendon injury, rash, sedation etc. Patient and/or guardian voices understanding.      Advised the patient to follow-up with the primary care physician for recheck, reevaluation, and consideration of further management.    I personally reviewed prior  external notes and test results pertinent to today's visit as well as additional imaging and testing completed in clinic today.     Please note that this dictation was created using voice recognition software. I have made every reasonable attempt to correct obvious errors, but I expect that there are errors of grammar and possibly content that I did not discover before finalizing the note.    This note was electronically signed by FARA Mackenzie

## 2024-12-30 ENCOUNTER — OFFICE VISIT (OUTPATIENT)
Dept: URGENT CARE | Facility: PHYSICIAN GROUP | Age: 79
End: 2024-12-30
Payer: MEDICARE

## 2024-12-30 VITALS
SYSTOLIC BLOOD PRESSURE: 110 MMHG | OXYGEN SATURATION: 94 % | DIASTOLIC BLOOD PRESSURE: 70 MMHG | HEIGHT: 68 IN | TEMPERATURE: 97.9 F | HEART RATE: 66 BPM | BODY MASS INDEX: 26.52 KG/M2 | WEIGHT: 175 LBS | RESPIRATION RATE: 14 BRPM

## 2024-12-30 DIAGNOSIS — J01.40 ACUTE PANSINUSITIS, RECURRENCE NOT SPECIFIED: ICD-10-CM

## 2024-12-30 RX ORDER — LEVOFLOXACIN 500 MG/1
500 TABLET, FILM COATED ORAL DAILY
Qty: 7 TABLET | Refills: 0 | Status: SHIPPED | OUTPATIENT
Start: 2024-12-30 | End: 2025-01-06

## 2024-12-30 ASSESSMENT — ENCOUNTER SYMPTOMS
EYE DISCHARGE: 0
FEVER: 0
EYE REDNESS: 0
HEADACHES: 0
SORE THROAT: 1
WHEEZING: 0
SHORTNESS OF BREATH: 0
COUGH: 1
SINUS PAIN: 1

## 2024-12-30 ASSESSMENT — FIBROSIS 4 INDEX: FIB4 SCORE: 2

## 2024-12-30 NOTE — PROGRESS NOTES
"Aneesh Hamilton is a 79 y.o. male who presents with Sinusitis (Not doing better from last visit )            Patient is an 79-year-old male history of sleep apnea currently on CPAP presents to urgent care with 4-week history of sinus congestion and pressure.  Patient was recently evaluated on 12-22 for similar symptoms and was started on doxycycline.  Patient reports that maybe on day 1-2 symptoms slightly improved however since patient has now needed to sit in his recliner as he has been unable to utilize his CPAP secondary to drainage and now he reports a cough.  He denies any difficulty breathing, chest pain, chest tightness.  He has been intermittently utilizing his nasal steroid Flonase however does not feel that this has provided relief.  He denies any new fevers or bodyaches.  On further discussion regarding allergies patient reports allergy to amoxicillin as \"it was no longer working \"when he was little.  Patient is uncertain if he has been on a cephalosporin before.    Sinusitis  Associated symptoms include congestion, coughing, ear pain and a sore throat. Pertinent negatives include no headaches or shortness of breath.       Review of Systems   Constitutional:  Positive for malaise/fatigue. Negative for fever.   HENT:  Positive for congestion, ear pain, sinus pain and sore throat.    Eyes:  Negative for discharge and redness.   Respiratory:  Positive for cough. Negative for shortness of breath and wheezing.    Neurological:  Negative for headaches.              Objective     /70 (BP Location: Left arm, Patient Position: Sitting, BP Cuff Size: Adult)   Pulse 66   Temp 36.6 °C (97.9 °F) (Temporal)   Resp 14   Ht 1.727 m (5' 8\")   Wt 79.4 kg (175 lb)   SpO2 94%   BMI 26.61 kg/m²    PMH:  has a past medical history of Depression (1/12/2010), Dyslipidemia, HTN (hypertension) (1/12/2010), Hypertension, Sleep apnea, and UTI (urinary tract infection).  MEDS: Reviewed .   ALLERGIES: " "  Allergies   Allergen Reactions    Escitalopram Diarrhea     Looks like \"chili\"    Metoprolol Diarrhea     Looks like \"Chili\"    Pcn [Penicillins] Rash     > 50 years ago     SURGHX:   Past Surgical History:   Procedure Laterality Date    GASTROSCOPY-ENDO  3/14/2012    Performed by EMLANIA OROZCO at ENDOSCOPY [facility] ORS    GASTROSCOPY-ENDO  3/13/2012    Performed by KARISHMA NEWMAN at ENDOSCOPY [facility] ORS    TRANS URETHRAL RESECTION PROSTATE  3/19/2009    Performed by SAI AKERS at SURGERY [facility]S    OTHER      PROSTATE SURGERY    TONSILLECTOMY       SOCHX:  reports that he has quit smoking. His smoking use included cigarettes. He has never been exposed to tobacco smoke. He quit smokeless tobacco use about 45 years ago. He reports that he does not drink alcohol and does not use drugs.  FH: Family history was reviewed, no pertinent findings to report    Physical Exam  Vitals reviewed.   Constitutional:       Appearance: He is well-developed.   HENT:      Head: Normocephalic and atraumatic.      Comments: Bilateral maxillary sinus tenderness with percussion-swollen turbinates bilaterally worse on the right than the left..  Bilateral TMs with clear middle ear effusions without evidence of erythema.  Posterior oropharynx is with erythema, positive postnasal drainage-with green discharge.     Mouth/Throat:      Pharynx: No oropharyngeal exudate.   Eyes:      Pupils: Pupils are equal, round, and reactive to light.   Cardiovascular:      Rate and Rhythm: Normal rate and regular rhythm.   Pulmonary:      Effort: Pulmonary effort is normal. No respiratory distress.      Breath sounds: Normal breath sounds. No wheezing.   Musculoskeletal:         General: Normal range of motion.      Cervical back: Normal range of motion and neck supple.   Lymphadenopathy:      Cervical: No cervical adenopathy.   Skin:     General: Skin is warm.      Findings: No rash.   Neurological:      Mental Status: He " "is alert and oriented to person, place, and time.   Psychiatric:         Behavior: Behavior normal.                             Assessment & Plan        Assessment & Plan  Acute pansinusitis, recurrence not specified    Orders:    levoFLOXacin (LEVAQUIN) 500 MG tablet; Take 1 Tablet by mouth every day for 7 days.                MDM/ Plan /Discussion:      Patient with 4-week history of sinus congestion, sinus discomfort with recent course of doxycycline of which did not appear to be helpful.  Lengthy discussion with the patient regarding allergies and history of antibiotic usage of which the patient is adamant that he has \"an allergy\" to penicillins and would like to avoid such another derivatives as he is uncertain if he has been on a cephalosporin.  Discussed that Levaquin is with blackbox warning and discussed significant side effects along with blackbox warning-will place the patient on 500 mg once a day for 7 days of which he will discontinue such if he develops side effects and/or joint pain-tendon pain.  He is to continue with Flonase at this time-and attempt utilization of CPAP.  Family on further discussion it does appear that patient had history of CKD of which he admits that this has resolved.    Differential diagnosis, natural history, supportive care, and indications for immediate follow-up discussed. Side effects of OTC or prescribed medications discussed.      Follow-up as needed if symptoms worsen or fail to improve to PCP, Urgent care or Emergency Room.     I have personally reviewed prior external notes and test results pertinent to today's visit.  I have independently reviewed and interpreted all diagnostics ordered during this urgent care acute visit.   Discussed management options (risks,benefits, and alternatives to treatment).    The patient and/or guardian demonstrated a good understanding and agreed with the treatment plan. And all questions were answered.  Please note that this dictation " was created using voice recognition software. I have made a reasonable attempt to correct obvious errors, but I expect that there are errors of grammar and possibly content that I did not discover before finalizing the note.

## 2025-01-02 ENCOUNTER — OFFICE VISIT (OUTPATIENT)
Dept: URGENT CARE | Facility: PHYSICIAN GROUP | Age: 80
End: 2025-01-02
Payer: MEDICARE

## 2025-01-02 VITALS
WEIGHT: 175.4 LBS | TEMPERATURE: 98.5 F | HEART RATE: 81 BPM | HEIGHT: 68 IN | BODY MASS INDEX: 26.58 KG/M2 | DIASTOLIC BLOOD PRESSURE: 74 MMHG | OXYGEN SATURATION: 94 % | SYSTOLIC BLOOD PRESSURE: 146 MMHG | RESPIRATION RATE: 18 BRPM

## 2025-01-02 DIAGNOSIS — J01.91 ACUTE RECURRENT SINUSITIS, UNSPECIFIED LOCATION: ICD-10-CM

## 2025-01-02 PROCEDURE — 3078F DIAST BP <80 MM HG: CPT

## 2025-01-02 PROCEDURE — 3077F SYST BP >= 140 MM HG: CPT

## 2025-01-02 PROCEDURE — 99213 OFFICE O/P EST LOW 20 MIN: CPT

## 2025-01-02 RX ORDER — CEFDINIR 300 MG/1
300 CAPSULE ORAL 2 TIMES DAILY
Qty: 14 CAPSULE | Refills: 0 | Status: SHIPPED | OUTPATIENT
Start: 2025-01-02 | End: 2025-01-09

## 2025-01-02 RX ORDER — CLINDAMYCIN HYDROCHLORIDE 300 MG/1
300 CAPSULE ORAL 3 TIMES DAILY
Qty: 21 CAPSULE | Refills: 0 | Status: SHIPPED | OUTPATIENT
Start: 2025-01-02 | End: 2025-01-09

## 2025-01-02 RX ORDER — METHYLPREDNISOLONE 4 MG/1
TABLET ORAL
Qty: 21 TABLET | Refills: 0 | Status: SHIPPED | OUTPATIENT
Start: 2025-01-02 | End: 2025-01-23

## 2025-01-02 ASSESSMENT — ENCOUNTER SYMPTOMS
NAUSEA: 0
SINUS PAIN: 1
FEVER: 0
HEADACHES: 1
SHORTNESS OF BREATH: 0
DIZZINESS: 0
WEAKNESS: 0
DIARRHEA: 0
VOMITING: 0
COUGH: 0

## 2025-01-02 ASSESSMENT — FIBROSIS 4 INDEX: FIB4 SCORE: 2

## 2025-01-02 NOTE — PROGRESS NOTES
"Subjective     Tang Hamilton is a 79 y.o. male who presents with Congestion (Head congestion, medication he got last time is causing him muscle inflammation on his knees)            Tang is here today with complaints of congestion, head pressure, and headache.  He was originally seen in urgent care on 12/22/2024 and was started on a 7-day course of doxycycline.  His symptoms persisted so he was seen again in urgent care on 1/30/2024 where he was started on Levaquin at the time he was given the black box warning of Levaquin and tendon pain which he states he experienced in his knees after 2 doses and that he stopped the medication.  He notes that his congestion and head pressure and headache persist.  He denies fevers.  He does not have a history of recurrent sinus infections.          Review of Systems   Constitutional:  Negative for fever and malaise/fatigue.   HENT:  Positive for congestion and sinus pain.    Respiratory:  Negative for cough and shortness of breath.    Cardiovascular:  Negative for chest pain.   Gastrointestinal:  Negative for diarrhea, nausea and vomiting.   Skin:  Negative for rash.   Neurological:  Positive for headaches. Negative for dizziness and weakness.   All other systems reviewed and are negative.             Objective     BP (!) 146/74   Pulse 81   Temp 36.9 °C (98.5 °F)   Resp 18   Ht 1.727 m (5' 8\")   Wt 79.6 kg (175 lb 6.4 oz)   SpO2 94%   BMI 26.67 kg/m²      Physical Exam  Vitals reviewed.   Constitutional:       Appearance: Normal appearance. He is not ill-appearing.   HENT:      Head: Normocephalic and atraumatic.      Right Ear: Ear canal normal. A middle ear effusion is present. Tympanic membrane is not erythematous or bulging.      Left Ear: Ear canal normal. A middle ear effusion is present. Tympanic membrane is not erythematous or bulging.      Nose: Congestion present.      Right Sinus: Maxillary sinus tenderness present.      Left Sinus: Maxillary sinus tenderness and " frontal sinus tenderness present.      Mouth/Throat:      Mouth: Mucous membranes are moist.      Pharynx: Oropharynx is clear.   Eyes:      Conjunctiva/sclera: Conjunctivae normal.   Cardiovascular:      Rate and Rhythm: Normal rate and regular rhythm.      Pulses: Normal pulses.      Heart sounds: Normal heart sounds.   Pulmonary:      Effort: Pulmonary effort is normal. No respiratory distress.      Breath sounds: Normal breath sounds. No wheezing or rhonchi.   Abdominal:      General: Abdomen is flat.      Palpations: Abdomen is soft.   Musculoskeletal:         General: Normal range of motion.   Skin:     General: Skin is warm and dry.   Neurological:      Mental Status: He is alert and oriented to person, place, and time.   Psychiatric:         Behavior: Behavior normal.         Judgment: Judgment normal.                             Assessment & Plan        Assessment & Plan  Acute recurrent sinusitis, unspecified location    Orders:    methylPREDNISolone (MEDROL DOSEPAK) 4 MG Tablet Therapy Pack; Follow schedule on package instructions.    clindamycin (CLEOCIN) 300 MG Cap; Take 1 Capsule by mouth 3 times a day for 7 days.    cefdinir (OMNICEF) 300 MG Cap; Take 1 Capsule by mouth 2 times a day for 7 days.    Given this is his third visit to urgent care he will be treated as a recurrent sinusitis with a combination of clindamycin and cefdinir.  Additionally Medrol Dosepak sent to his pharmacy to help with sinus pressure and headache.  He can also continue with his nasal rinses.    Ray can continue supportive care that was discussed in clinic such as alternating ibuprofen and acetaminophen, rest, plenty of fluids such as water, Pedialyte, low sugar Gatorade, broth, hot steamy showers, hot tea with honey, cough drops/throat spray, and a cool-mist humidifier by bed at night.    Discussed ER precautions such as a fever greater than 101F that cannot be brought down by Tylenol or Advil, shortness of breath, or  difficulty breathing.

## 2025-01-23 ENCOUNTER — OFFICE VISIT (OUTPATIENT)
Dept: MEDICAL GROUP | Facility: MEDICAL CENTER | Age: 80
End: 2025-01-23
Payer: MEDICARE

## 2025-01-23 VITALS
RESPIRATION RATE: 12 BRPM | BODY MASS INDEX: 27.74 KG/M2 | WEIGHT: 183 LBS | DIASTOLIC BLOOD PRESSURE: 60 MMHG | OXYGEN SATURATION: 98 % | HEIGHT: 68 IN | SYSTOLIC BLOOD PRESSURE: 138 MMHG | HEART RATE: 76 BPM | TEMPERATURE: 97.5 F

## 2025-01-23 DIAGNOSIS — R09.81 NASAL CONGESTION: ICD-10-CM

## 2025-01-23 DIAGNOSIS — H35.3230 BILATERAL EXUDATIVE AGE-RELATED MACULAR DEGENERATION, UNSPECIFIED STAGE (HCC): ICD-10-CM

## 2025-01-23 DIAGNOSIS — C67.9 MALIGNANT NEOPLASM OF URINARY BLADDER, UNSPECIFIED SITE (HCC): ICD-10-CM

## 2025-01-23 DIAGNOSIS — I10 PRIMARY HYPERTENSION: ICD-10-CM

## 2025-01-23 PROCEDURE — 3075F SYST BP GE 130 - 139MM HG: CPT | Performed by: STUDENT IN AN ORGANIZED HEALTH CARE EDUCATION/TRAINING PROGRAM

## 2025-01-23 PROCEDURE — 3078F DIAST BP <80 MM HG: CPT | Performed by: STUDENT IN AN ORGANIZED HEALTH CARE EDUCATION/TRAINING PROGRAM

## 2025-01-23 PROCEDURE — 99214 OFFICE O/P EST MOD 30 MIN: CPT | Performed by: STUDENT IN AN ORGANIZED HEALTH CARE EDUCATION/TRAINING PROGRAM

## 2025-01-23 RX ORDER — IPRATROPIUM BROMIDE 21 UG/1
2 SPRAY, METERED NASAL EVERY 12 HOURS
Qty: 30 ML | Refills: 3 | Status: SHIPPED | OUTPATIENT
Start: 2025-01-23

## 2025-01-23 ASSESSMENT — PATIENT HEALTH QUESTIONNAIRE - PHQ9
SUM OF ALL RESPONSES TO PHQ QUESTIONS 1-9: 0
5. POOR APPETITE OR OVEREATING: NOT AT ALL
8. MOVING OR SPEAKING SO SLOWLY THAT OTHER PEOPLE COULD HAVE NOTICED. OR THE OPPOSITE, BEING SO FIGETY OR RESTLESS THAT YOU HAVE BEEN MOVING AROUND A LOT MORE THAN USUAL: NOT AT ALL
1. LITTLE INTEREST OR PLEASURE IN DOING THINGS: NOT AT ALL
3. TROUBLE FALLING OR STAYING ASLEEP OR SLEEPING TOO MUCH: NOT AT ALL
7. TROUBLE CONCENTRATING ON THINGS, SUCH AS READING THE NEWSPAPER OR WATCHING TELEVISION: NOT AT ALL
2. FEELING DOWN, DEPRESSED, IRRITABLE, OR HOPELESS: NOT AT ALL
SUM OF ALL RESPONSES TO PHQ9 QUESTIONS 1 AND 2: 0
6. FEELING BAD ABOUT YOURSELF - OR THAT YOU ARE A FAILURE OR HAVE LET YOURSELF OR YOUR FAMILY DOWN: NOT AL ALL
4. FEELING TIRED OR HAVING LITTLE ENERGY: NOT AT ALL
9. THOUGHTS THAT YOU WOULD BE BETTER OFF DEAD, OR OF HURTING YOURSELF: NOT AT ALL

## 2025-01-23 ASSESSMENT — FIBROSIS 4 INDEX: FIB4 SCORE: 2

## 2025-01-23 NOTE — PROGRESS NOTES
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  Last seen at urgent care 1/2/2025 for sinusitis.  Patient reported during [facility] noted for mass in bladder, biopsy showed papillary urothelial carcinoma, with clean margin    congestion  Naline spray      Health Maintenance:     ROS:  ROS at baseline    Objective:     Exam:  /60   Pulse 76   Temp 36.4 °C (97.5 °F) (Temporal)   Resp 12   Ht 1.727 m (5' 8\")   Wt 83 kg (183 lb)   SpO2 98%   BMI 27.83 kg/m²  Body mass index is 27.83 kg/m².    Physical Exam  Constitutional:       Appearance: Normal appearance.   Cardiovascular:      Rate and Rhythm: Normal rate and regular rhythm.   Pulmonary:      Effort: Pulmonary effort is normal.      Breath sounds: Normal breath sounds.   Musculoskeletal:      Cervical back: Normal range of motion and neck supple.   Neurological:      Mental Status: He is alert.             Labs:     Assessment & Plan:     79 y.o. male with the following -       1. Nasal congestion  Chronic, stable  History of rhinitis, recently seen at urgent care for possible sinusitis patient continues to have symptoms.  Recommend conservative management with nasal rinses topical therapy and ipratropium will be prescribed  - ipratropium (ATROVENT) 0.03 % Solution; Administer 2 Sprays into affected nostril(S) every 12 hours.  " Dispense: 30 mL; Refill: 3    2. Malignant neoplasm of urinary bladder, unspecified site (HCC)  Chronic, stable  Pathology report was reviewed as per HPI  Patient [facility] follow[facility]    3. Primary hypertension  Chronic, stable blood pressure well-controlled amlodipine 10 mg daily benazepril 40 mg daily, hydrochlorothiazide 25 mg daily.            HCC Gap Form    Diagnosis: H35.3230 - Bilateral exudative age-related macular degeneration, unspecified stage (HCC)  Assessment and plan: Chronic, stable, as based on today's assessment and impact on other conditions evaluated today. Continue with current treatment plan: [facility].  Symptoms and stable follow-up with specialist as directed, but at least annually.  Last edited 01/23/25 18:27 PST by Donald Pelletier M.D.         Return in about 6 months (around 7/23/2025) for Lab review, Med check.    Please note that this dictation was created using voice recognition software. I have made every reasonable attempt to correct obvious errors, but I expect that there are errors of grammar and possibly content that I did not discover before finalizing the note.

## 2025-02-01 ENCOUNTER — OFFICE VISIT (OUTPATIENT)
Dept: URGENT CARE | Facility: PHYSICIAN GROUP | Age: 80
End: 2025-02-01
Payer: MEDICARE

## 2025-02-01 VITALS
OXYGEN SATURATION: 97 % | WEIGHT: 182.8 LBS | SYSTOLIC BLOOD PRESSURE: 130 MMHG | HEIGHT: 68 IN | BODY MASS INDEX: 27.71 KG/M2 | HEART RATE: 69 BPM | TEMPERATURE: 97.9 F | RESPIRATION RATE: 18 BRPM | DIASTOLIC BLOOD PRESSURE: 72 MMHG

## 2025-02-01 DIAGNOSIS — J32.4 CHRONIC PANSINUSITIS: ICD-10-CM

## 2025-02-01 PROCEDURE — 99214 OFFICE O/P EST MOD 30 MIN: CPT | Performed by: PHYSICIAN ASSISTANT

## 2025-02-01 PROCEDURE — 3078F DIAST BP <80 MM HG: CPT | Performed by: PHYSICIAN ASSISTANT

## 2025-02-01 PROCEDURE — 1126F AMNT PAIN NOTED NONE PRSNT: CPT | Performed by: PHYSICIAN ASSISTANT

## 2025-02-01 PROCEDURE — 3075F SYST BP GE 130 - 139MM HG: CPT | Performed by: PHYSICIAN ASSISTANT

## 2025-02-01 RX ORDER — PREDNISONE 10 MG/1
TABLET ORAL
Qty: 22 TABLET | Refills: 0 | Status: SHIPPED | OUTPATIENT
Start: 2025-02-01

## 2025-02-01 ASSESSMENT — ENCOUNTER SYMPTOMS
SORE THROAT: 0
COUGH: 0
WHEEZING: 0
CHILLS: 0
EYE PAIN: 0
SINUS PAIN: 1
DIAPHORESIS: 0
FEVER: 0
DIZZINESS: 0
EYE REDNESS: 0
EYE DISCHARGE: 0
SHORTNESS OF BREATH: 0
HEADACHES: 0

## 2025-02-01 ASSESSMENT — PAIN SCALES - GENERAL: PAINLEVEL_OUTOF10: NO PAIN

## 2025-02-01 ASSESSMENT — FIBROSIS 4 INDEX: FIB4 SCORE: 2

## 2025-02-01 NOTE — PROGRESS NOTES
"  Subjective:     Herber Hamilton  is a 79 y.o. male who presents for Sinusitis ([place] up from last visit on 01/02/2025 not getting better )       He presents today for his fifth visit in regards to sinusitis symptoms.  Initial visit started on 12/22/2024 and he was started on doxycycline, since follow-up visit on 12/30/2024 he was started on levofloxacin, on 1/2/2025 you started on cefdinir, clindamycin and Medrol Dosepak, he then followed up with his primary care on 1/23/2025 and started on ipratropium nasal sprays.  He only received symptom improvement with the cefdinir, clindamycin, Medrol Dosepak prescription combination.  States that after 3 days his symptoms had fully resolved before gradually returning again for seeing his primary care provider.  At this time he is experiencing sinus congestion.  No fevers.  No ear pain.  No sore throat.  No chest pain or shortness of breath       Review of Systems   Constitutional:  Negative for chills, diaphoresis, fever and malaise/fatigue.   HENT:  Positive for congestion and sinus pain. Negative for ear discharge and sore throat.    Eyes:  Negative for pain, discharge and redness.   Respiratory:  Negative for cough, shortness of breath and wheezing.    Cardiovascular:  Negative for chest pain.   Neurological:  Negative for dizziness and headaches.      Allergies   Allergen Reactions    Levofloxacin Swelling and Unspecified     Sever joint pain     Escitalopram Diarrhea     Looks like \"chili\"    Metoprolol Diarrhea     Looks like \"Chili\"    Pcn [Penicillins] Rash     > 50 years ago     Past Medical History:   Diagnosis Date    Depression 1/12/2010    Dyslipidemia     HTN (hypertension) 1/12/2010    Hypertension     Sleep apnea     UTI (urinary tract infection)     NOW AND 2 MONTHS AGO POST OP        Objective:   /72 (BP Location: Left arm, Patient Position: Sitting, BP Cuff Size: Adult)   Pulse 69   Temp 36.6 °C (97.9 °F) (Temporal)   Resp 18   Ht 1.727 m (5' " "8\")   Wt 82.9 kg (182 lb 12.8 oz)   SpO2 97%   BMI 27.79 kg/m²   Physical Exam  Vitals and nursing note reviewed.   Constitutional:       General: He is not in acute distress.     Appearance: Normal appearance. He is not ill-appearing, toxic-appearing or diaphoretic.   HENT:      Head: Normocephalic.      Right Ear: Tympanic membrane, ear canal and external ear normal. There is no impacted cerumen.      Left Ear: Tympanic membrane, ear canal and external ear normal. There is no impacted cerumen.      Nose: Congestion present. No rhinorrhea.      Mouth/Throat:      Mouth: Mucous membranes are moist.      Pharynx: No oropharyngeal exudate or posterior oropharyngeal erythema.   Eyes:      General:         Right eye: No discharge.         Left eye: No discharge.      Conjunctiva/sclera: Conjunctivae normal.   Cardiovascular:      Rate and Rhythm: Normal rate and regular rhythm.   Pulmonary:      Effort: Pulmonary effort is normal. No respiratory distress.      Breath sounds: Normal breath sounds. No stridor. No wheezing or rhonchi.   Musculoskeletal:      Cervical back: Neck supple.   Lymphadenopathy:      Cervical: No cervical adenopathy.   Neurological:      General: No focal deficit present.      Mental Status: He is alert and oriented to person, place, and time.   Psychiatric:         Mood and Affect: Mood normal.         Behavior: Behavior normal.         Thought Content: Thought content normal.         Judgment: Judgment normal.             Diagnostic testing: None    Assessment/Plan:     Encounter Diagnoses   Name Primary?    Chronic pansinusitis          Plan for care for today's complaint includes discussing with patient I have reduced suspicion for bacterial induced sinus infection at this time as he has undergone a course of doxycycline, levofloxacin, cefdinir/clindamycin thus far without any longstanding symptom improvement.  He did receive improvement when receiving cefdinir/clindamycin and Medrol " Dosepak combination, do have suspicion that the steroid aspect of this combination is what gave him improvement, with this suspicion I will proceed with a prednisone taper.  Referral placed to ENT due to chronic nature of symptoms.  Vital signs were stable during today's office visit, patient was overall well-appearing. Continue to monitor symptoms and return to urgent care or follow-up with primary care provider if symptoms remain ongoing.  Follow-up in the emergency department if symptoms become severe, ER precautions discussed in office today.  Urgent care visit from 12/22/2024, 12/30/2024 and 1/2/2025 reviewed.  Primary care visit from 1/23/2025 reviewed.   Prescription for prednisone provided.    See AVS Instructions below for written guidance provided to patient on after-visit management and care in addition to our verbal discussion during the visit.    Please note that this dictation was created using voice recognition software. I have attempted to correct all errors, but there may be sound-alike, spelling, grammar and possibly content errors that I did not discover before finalizing the note.    Bladimir Ashraf PA-C

## 2025-02-14 DIAGNOSIS — R09.81 NASAL CONGESTION: ICD-10-CM

## 2025-02-18 RX ORDER — IPRATROPIUM BROMIDE 21 UG/1
2 SPRAY, METERED NASAL EVERY 12 HOURS
Qty: 90 ML | Refills: 2 | Status: SHIPPED | OUTPATIENT
Start: 2025-02-18

## 2025-02-22 ENCOUNTER — HOSPITAL ENCOUNTER (OUTPATIENT)
Dept: LAB | Facility: MEDICAL CENTER | Age: 80
End: 2025-02-22
Attending: ANESTHESIOLOGY
Payer: MEDICARE

## 2025-02-22 LAB
ANION GAP SERPL CALC-SCNC: 15 MMOL/L (ref 7–16)
BASOPHILS # BLD AUTO: 0.6 % (ref 0–1.8)
BASOPHILS # BLD: 0.04 K/UL (ref 0–0.12)
BUN SERPL-MCNC: 17 MG/DL (ref 8–22)
CALCIUM SERPL-MCNC: 9.5 MG/DL (ref 8.5–10.5)
CHLORIDE SERPL-SCNC: 106 MMOL/L (ref 96–112)
CO2 SERPL-SCNC: 23 MMOL/L (ref 20–33)
CREAT SERPL-MCNC: 0.97 MG/DL (ref 0.5–1.4)
EOSINOPHIL # BLD AUTO: 0.39 K/UL (ref 0–0.51)
EOSINOPHIL NFR BLD: 5.6 % (ref 0–6.9)
ERYTHROCYTE [DISTWIDTH] IN BLOOD BY AUTOMATED COUNT: 44.9 FL (ref 35.9–50)
GFR SERPLBLD CREATININE-BSD FMLA CKD-EPI: 79 ML/MIN/1.73 M 2
GLUCOSE SERPL-MCNC: 143 MG/DL (ref 65–99)
HCT VFR BLD AUTO: 44.3 % (ref 42–52)
HGB BLD-MCNC: 15.4 G/DL (ref 14–18)
IMM GRANULOCYTES # BLD AUTO: 0.04 K/UL (ref 0–0.11)
IMM GRANULOCYTES NFR BLD AUTO: 0.6 % (ref 0–0.9)
LYMPHOCYTES # BLD AUTO: 1.06 K/UL (ref 1–4.8)
LYMPHOCYTES NFR BLD: 15.3 % (ref 22–41)
MCH RBC QN AUTO: 31.3 PG (ref 27–33)
MCHC RBC AUTO-ENTMCNC: 34.8 G/DL (ref 32.3–36.5)
MCV RBC AUTO: 90 FL (ref 81.4–97.8)
MONOCYTES # BLD AUTO: 0.68 K/UL (ref 0–0.85)
MONOCYTES NFR BLD AUTO: 9.8 % (ref 0–13.4)
NEUTROPHILS # BLD AUTO: 4.7 K/UL (ref 1.82–7.42)
NEUTROPHILS NFR BLD: 68.1 % (ref 44–72)
NRBC # BLD AUTO: 0 K/UL
NRBC BLD-RTO: 0 /100 WBC (ref 0–0.2)
PLATELET # BLD AUTO: 140 K/UL (ref 164–446)
PMV BLD AUTO: 10.4 FL (ref 9–12.9)
POTASSIUM SERPL-SCNC: 3.7 MMOL/L (ref 3.6–5.5)
RBC # BLD AUTO: 4.92 M/UL (ref 4.7–6.1)
SODIUM SERPL-SCNC: 144 MMOL/L (ref 135–145)
WBC # BLD AUTO: 6.9 K/UL (ref 4.8–10.8)

## 2025-02-22 PROCEDURE — 36415 COLL VENOUS BLD VENIPUNCTURE: CPT

## 2025-02-22 PROCEDURE — 80048 BASIC METABOLIC PNL TOTAL CA: CPT

## 2025-02-22 PROCEDURE — 85025 COMPLETE CBC W/AUTO DIFF WBC: CPT

## 2025-03-24 ENCOUNTER — PATIENT MESSAGE (OUTPATIENT)
Dept: HEALTH INFORMATION MANAGEMENT | Facility: OTHER | Age: 80
End: 2025-03-24

## 2025-04-10 ENCOUNTER — TELEPHONE (OUTPATIENT)
Dept: HEALTH INFORMATION MANAGEMENT | Facility: OTHER | Age: 80
End: 2025-04-10
Payer: MEDICARE

## 2025-05-04 ASSESSMENT — ENCOUNTER SYMPTOMS: GENERAL WELL-BEING: GOOD

## 2025-05-04 ASSESSMENT — ACTIVITIES OF DAILY LIVING (ADL): BATHING_REQUIRES_ASSISTANCE: 0

## 2025-05-06 ENCOUNTER — OFFICE VISIT (OUTPATIENT)
Dept: FAMILY PLANNING/WOMEN'S HEALTH CLINIC | Facility: PHYSICIAN GROUP | Age: 80
End: 2025-05-06
Payer: MEDICARE

## 2025-05-06 VITALS
SYSTOLIC BLOOD PRESSURE: 118 MMHG | DIASTOLIC BLOOD PRESSURE: 64 MMHG | HEART RATE: 69 BPM | OXYGEN SATURATION: 95 % | WEIGHT: 182 LBS | TEMPERATURE: 97.9 F | BODY MASS INDEX: 27.58 KG/M2 | HEIGHT: 68 IN

## 2025-05-06 DIAGNOSIS — Z85.51 HISTORY OF BLADDER CANCER: ICD-10-CM

## 2025-05-06 DIAGNOSIS — E78.2 MIXED HYPERLIPIDEMIA: ICD-10-CM

## 2025-05-06 DIAGNOSIS — M10.9 GOUT OF RIGHT FOOT, UNSPECIFIED CAUSE, UNSPECIFIED CHRONICITY: ICD-10-CM

## 2025-05-06 DIAGNOSIS — F32.5 MAJOR DEPRESSION IN REMISSION (HCC): ICD-10-CM

## 2025-05-06 DIAGNOSIS — E66.3 OVERWEIGHT (BMI 25.0-29.9): ICD-10-CM

## 2025-05-06 DIAGNOSIS — G47.33 OSA ON CPAP: ICD-10-CM

## 2025-05-06 DIAGNOSIS — I10 PRIMARY HYPERTENSION: ICD-10-CM

## 2025-05-06 DIAGNOSIS — H35.3231 EXUDATIVE AGE-RELATED MACULAR DEGENERATION OF BOTH EYES WITH ACTIVE CHOROIDAL NEOVASCULARIZATION (HCC): ICD-10-CM

## 2025-05-06 DIAGNOSIS — F10.21 ALCOHOL DEPENDENCE IN REMISSION (HCC): ICD-10-CM

## 2025-05-06 DIAGNOSIS — G31.9 CEREBRAL ATROPHY (HCC): ICD-10-CM

## 2025-05-06 DIAGNOSIS — N40.1 BENIGN PROSTATIC HYPERPLASIA WITH LOWER URINARY TRACT SYMPTOMS, SYMPTOM DETAILS UNSPECIFIED: ICD-10-CM

## 2025-05-06 PROCEDURE — 3078F DIAST BP <80 MM HG: CPT | Performed by: NURSE PRACTITIONER

## 2025-05-06 PROCEDURE — G0439 PPPS, SUBSEQ VISIT: HCPCS | Performed by: NURSE PRACTITIONER

## 2025-05-06 PROCEDURE — 1126F AMNT PAIN NOTED NONE PRSNT: CPT | Performed by: NURSE PRACTITIONER

## 2025-05-06 PROCEDURE — 3074F SYST BP LT 130 MM HG: CPT | Performed by: NURSE PRACTITIONER

## 2025-05-06 SDOH — ECONOMIC STABILITY: HOUSING INSECURITY: IN THE LAST 12 MONTHS, WAS THERE A TIME WHEN YOU WERE NOT ABLE TO PAY THE MORTGAGE OR RENT ON TIME?: NO

## 2025-05-06 SDOH — ECONOMIC STABILITY: FOOD INSECURITY: WITHIN THE PAST 12 MONTHS, YOU WORRIED THAT YOUR FOOD WOULD RUN OUT BEFORE YOU GOT THE MONEY TO BUY MORE.: NEVER TRUE

## 2025-05-06 SDOH — ECONOMIC STABILITY: HOUSING INSECURITY: AT ANY TIME IN THE PAST 12 MONTHS, WERE YOU HOMELESS OR LIVING IN A SHELTER (INCLUDING NOW)?: NO

## 2025-05-06 SDOH — ECONOMIC STABILITY: FOOD INSECURITY: HOW HARD IS IT FOR YOU TO PAY FOR THE VERY BASICS LIKE FOOD, HOUSING, MEDICAL CARE, AND HEATING?: NOT HARD AT ALL

## 2025-05-06 SDOH — ECONOMIC STABILITY: FOOD INSECURITY: WITHIN THE PAST 12 MONTHS, THE FOOD YOU BOUGHT JUST DIDN'T LAST AND YOU DIDN'T HAVE MONEY TO GET MORE.: NEVER TRUE

## 2025-05-06 SDOH — ECONOMIC STABILITY: HOUSING INSECURITY: IN THE PAST 12 MONTHS, HOW MANY TIMES HAVE YOU MOVED WHERE YOU WERE LIVING?: 0

## 2025-05-06 SDOH — ECONOMIC STABILITY: TRANSPORTATION INSECURITY: IN THE PAST 12 MONTHS, HAS LACK OF TRANSPORTATION KEPT YOU FROM MEDICAL APPOINTMENTS OR FROM GETTING MEDICATIONS?: NO

## 2025-05-06 ASSESSMENT — PAIN SCALES - GENERAL: PAINLEVEL_OUTOF10: NO PAIN

## 2025-05-06 ASSESSMENT — PATIENT HEALTH QUESTIONNAIRE - PHQ9: CLINICAL INTERPRETATION OF PHQ2 SCORE: 0

## 2025-05-06 ASSESSMENT — ACTIVITIES OF DAILY LIVING (ADL): LACK_OF_TRANSPORTATION: NO

## 2025-05-06 ASSESSMENT — FIBROSIS 4 INDEX: FIB4 SCORE: 2.07

## 2025-05-06 NOTE — ASSESSMENT & PLAN NOTE
Stable.   Dx [facility].  Patient reports stopped drinking approx 10 years ago. Continue abstinence. Cont f/u with [facility]CP / [facility] for ongoing monitoring

## 2025-05-06 NOTE — ASSESSMENT & PLAN NOTE
Stable on Depakote/ Remeron. PHQ 2 - 0 today . No depressive symptoms in the last two months. Patient reports taking medication to calm anger issues. Cont f/u with [facility]CP/ [facility] for ongoing discussion and medication management

## 2025-05-06 NOTE — ASSESSMENT & PLAN NOTE
Stable. Patient continues to receive periodic [facility]. Cont f/u with [facility] for ongoing monitoring and medication management

## 2025-05-06 NOTE — ASSESSMENT & PLAN NOTE
Stable. Records review CT Head (11/10/2017) reports Cerebral atrophy.  Patient able to recall 3/3 words/ draw the clock correclty. Patient able to perform all ADLS/ IADLs independently. Cont f/u with PCP for ongoing monitoring.

## 2025-05-06 NOTE — ASSESSMENT & PLAN NOTE
Stable Treated for bladder CA 12/2024. Had bladder reconstruction. Has cystoscopy every 3 months. Cont f[facility] for [facility]

## 2025-05-06 NOTE — ASSESSMENT & PLAN NOTE
Stable. Patient tolerating CPAP. No issues at this time. No FRANK. Cont f/u with pulmonary [facility] for ongoing [facility] and management

## 2025-05-06 NOTE — ASSESSMENT & PLAN NOTE
Stable. Patient taking HCTZ/. K+ / amlodipine/ benazepril/ labetolol,  BP today  118/64.  HR RRR No BLE edema. Cont f/u with [facility]CP/ [facility] for ongoing monitoring and medication management

## 2025-05-06 NOTE — ASSESSMENT & PLAN NOTE
Stable on atorvastatin. Records review .   Latest Reference Range & Units 07/28/20 09:56   Cholesterol,Tot 100 - 199 mg/dL 129   Triglycerides 0 - 149 mg/dL 240 (H)   HDL >=40 mg/dL 30 !   LDL <100 mg/dL 51   (H): Data is abnormally high  !: Data is abnormal Cont heart healthy diet and regular exercise. Cont f/u with PCP for ongoing monitoring and medication management

## 2025-05-06 NOTE — ASSESSMENT & PLAN NOTE
Stable on Terazosin / Symptoms improved with rx. No dizziness. Cont f/u with PCP for ongoing monitoring and medication management

## 2025-05-06 NOTE — ASSESSMENT & PLAN NOTE
Stable. BMI 27.67 Cont heart healthy diet and regular exercise. Cont f/u with PCP for ongoing monitoring and discussion

## 2025-05-06 NOTE — ASSESSMENT & PLAN NOTE
Stable. Rx allopurinol. Gout occurs in R foot. Patient is asymptomatic. Cont f/u with [facility] for [facility] and medication management

## 2025-05-06 NOTE — PROGRESS NOTES
Comprehensive Health Assessment Program     Herber Hamilton is a 79 y.o. here for his comprehensive health assessment.    Patient Active Problem List    Diagnosis Date Noted    Alcohol dependence in remission (HCC) 05/06/2025    History of bladder cancer 01/23/2025    Gout 07/23/2024    Chronic low back pain 05/08/2024    Cerebral atrophy (HCC) 05/08/2024    Thrombocytopenia (HCC) 05/08/2024    Hypophosphatemia 12/13/2023    ACP (advance care planning) 12/11/2023    Overweight (BMI 25.0-29.9) 08/29/2023    Exudative age-related macular degeneration of both eyes with active choroidal neovascularization (HCC) 08/29/2023    Chronic pain syndrome 08/21/2023    Scoliosis 07/19/2023    Elevated PSA 05/07/2019    Major depression in remission ([facility]) 05/07/2018    PTSD (post-traumatic stress disorder) 05/07/2018    Benign prostatic hyperplasia with lower urinary tract symptoms 11/06/2017    ALFRED on CPAP 08/19/2016    RLS (restless legs syndrome) 06/01/2015    History of GI bleed 05/09/2013    Colon polyp 05/02/2011    Hyperlipidemia 05/02/2011    Hypertension 01/12/2010       Current Outpatient Medications   Medication Sig Dispense Refill    allopurinol (ZYLOPRIM) 300 MG Tab Take 450 mg by mouth every day.      hydroCHLOROthiazide 25 MG Tab Take 25 mg by mouth every day.      potassium chloride (KLOR-CON) 20 MEQ Pack Take 10 mEq by mouth every day.      amLODIPine (NORVASC) 10 MG Tab Take 10 mg by mouth every day.      benazepril (LOTENSIN) 40 MG tablet Take 40 mg by mouth every day.      labetalol (NORMODYNE) 100 MG Tab Take 100 mg by mouth every day.      traMADol (ULTRAM) 50 MG Tab Take 50 mg by mouth every 6 hours as needed for Mild Pain.      Multiple Vitamins-Minerals (PRESERVISION AREDS 2) Cap Take 1 Capsule by mouth 2 times a day.      divalproex ER (DEPAKOTE ER) 500 MG TABLET SR 24 HR Take 500 mg by mouth every day.      mirtazapine (REMERON) 45 MG tablet Take 45 mg by mouth every evening.      terazosin (HYTRIN)  2 MG Cap Take 2 mg by mouth every evening.      cyanocobalamin (VITAMIN B12) 1000 MCG Tab Take 1,000 mcg by mouth every day.      vitamin D (CHOLECALCIFEROL) 1000 UNIT Tab Take 1,000 Units by mouth every day.      ascorbic acid (VITAMIN C) 500 MG tablet Take 500 mg by mouth every day.      atorvastatin (LIPITOR) 10 MG Tab Take 1 Tab by mouth every day. 100 Tab 3     No current facility-administered medications for this visit.          Current supplements as per medication list.     Allergies:   Levofloxacin, Escitalopram, Metoprolol, and Pcn [penicillins]  Social History     Tobacco Use    Smoking status: Former     Types: Cigarettes     Passive exposure: Never    Smokeless tobacco: Former     Quit date: 1980   Vaping Use    Vaping status: Never Used   Substance Use Topics    Alcohol use: Never     Comment: rarely    Drug use: Never     Family History   Problem Relation Age of Onset    Non-contributory Mother         unknown    Heart Disease Father     Arthritis Neg Hx     Lung Disease Neg Hx     Genetic Disorder Neg Hx     Cancer Neg Hx     Psychiatric Illness Neg Hx     Diabetes Neg Hx     Hypertension Neg Hx     Hyperlipidemia Neg Hx     Stroke Neg Hx     Alcohol/Drug Neg Hx      Herber  has a past medical history of Depression (1/12/2010), Dyslipidemia, HTN (hypertension) (1/12/2010), Hypertension, Sleep apnea, and UTI (urinary tract infection).   Past Surgical History:   Procedure Laterality Date    GASTROSCOPY-ENDO  3/14/2012    Performed by MELANIA OROZCO at ENDOSCOPY [facility]    GASTROSCOPY-ENDO  3/13/2012    Performed by KARISHMA NEWMAN at ENDOSCOPY [facility]    TRANS URETHRAL RESECTION PROSTATE  3/19/2009    Performed by SAI AKERS at SUR[facility]ERY [facility]    OTHER      PROSTATE SURGERY    TONSILLECTOMY         Screening:  In the last six months have you experienced any leakage of urine? None     Depression Screening  Little interest or pleasure in doing things?  0 - not at  all  Feeling down, depressed , or hopeless? 0 - not at all  Patient Health Questionnaire Score: 0     If depressive symptoms identified deferred to follow up visit unless specifically addressed in assessment and plan.    Interpretation of PHQ-9 Total Score   Score Severity   1-4 No Depression   5-9 Mild Depression   10-14 Moderate Depression   15-19 Moderately Severe Depression   20-27 Severe Depression    Screening for Cognitive Impairment  Do you or any of your friends or family members have any concern about your memory? No  Three Minute Recall (Village, Kitchen, Baby) 3/3    Lan clock face with all 12 numbers and set the hands to show 10 minutes past 11.  Yes 5/5  Cognitive concerns identified deferred for follow up unless specifically addressed in assessment and plan.    Fall Risk Assessment  Has the patient had two or more falls in the last year or any fall with injury in the last year?  No    Safety Assessment  Do you always wear your seatbelt?  Yes  Any changes to home needed to function safely? No  Difficulty hearing.  Yes  Patient counseled about all safety risks that were identified.    Functional Assessment ADLs  Are there any barriers preventing you from cooking for yourself or meeting nutritional needs?  No.    Are there any barriers preventing you from driving safely or obtaining transportation?  No.    Are there any barriers preventing you from using a telephone or calling for help?  No    Are there any barriers preventing you from shopping?  No.    Are there any barriers preventing you from taking care of your own finances?  No    Are there any barriers preventing you from managing your medications?  No    Are there any barriers preventing you from showering, bathing or dressing yourself? No    Are there any barriers preventing you from doing housework or laundry? No  Are there any barriers preventing you from using the toilet?No  Are you currently engaging in any exercise or physical activity?   No.      Self-Assessment of Health  What is your perception of your health? Good    Do you sleep more than six hours a night? Yes    In the past 7 days, how much did pain keep you from doing your normal work? A lot    Do you spend quality time with family or friends (virtually or in person)? Yes    Do you usually eat a heart healthy diet that constists of a variety of fruits, vegetables, whole grains and fiber? Yes    Do you eat foods high in fat and/or Fast Food more than three times per week? No    How concerned are you that your medical conditions are not being well managed? Not at all    Are you worried that in the next 2 months, you may not have stable housing that you own, rent, or stay in as part of a household? No      Advance Care Planning  Do you have an Advance Directive, Living Will, Durable Power of , or POLST? Yes                 Health Maintenance Summary            Current Care Gaps       COVID-19 Vaccine (3 - 2024-25 season) Overdue since 9/1/2024 03/26/2021  Imm Admin: MODERNA SARS-COV-2 VACCINE (12+)    02/26/2021  Imm Admin: MODERNA SARS-COV-2 VACCINE (12+)                      Upcoming       Annual Wellness Visit (Yearly) Next due on 5/6/2026 05/06/2025  Level of Service: MD ANNUAL WELLNESS VISIT-INCLUDES PPPS SUBSEQUE*    07/23/2024  Level of Service: MD ANNUAL WELLNESS VISIT-INCLUDES PPPS SUBSEQUE*    07/23/2024  Visit Dx: Medicare annual wellness visit, subsequent    05/08/2024  Level of Service: MD ANNUAL WELLNESS VISIT-INCLUDES PPPS SUBSEQUE*    08/29/2023  Level of Service: ANNUAL WELLNESS VISIT-INCLUDES PPPS SUBSEQUE*     Only the first 5 history entries have been loaded, but more history exists.            IMM DTaP/Tdap/Td Vaccine (3 - Td or Tdap) Next due on 7/19/2033 07/19/2023  Imm Admin: Tdap Vaccine    05/09/2013  Imm Admin: Tdap Vaccine                      Completed or No Longer Recommended       Influenza Vaccine (Series Information) Completed       09/13/2024  Outside Immunization: Influenza, High Dose    09/15/2023  Imm Admin: Influenza Vaccine Adult HD    11/15/2022  Imm Admin: Influenza Vaccine Quad Inj (Pf)    10/06/2021  Imm Admin: Influenza Vaccine Quad Inj (Pf)    09/23/2020  Imm Admin: Influenza Vaccine Quad Inj (Pf)      Only the first 5 history entries have been loaded, but more history exists.              Zoster (Shingles) Vaccines (Series Information) Completed      04/11/2019  Imm Admin: Zoster Vaccine Recombinant (RZV) (SHINGRIX)    02/07/2019  Imm Admin: Zoster Vaccine Recombinant (RZV) (SHINGRIX)    12/05/2018  Imm Admin: Zoster Vaccine Recombinant (RZV) (SHINGRIX)    10/27/2016  Imm Admin: Zoster Vaccine Live (ZVL) (Zostavax) - HISTORICAL DATA              Pneumococcal Vaccine: 50+ Years (Series Information) Completed      01/11/2019  Imm Admin: Pneumococcal polysaccharide vaccine (PPSV-23)    06/03/2016  Imm Admin: Pneumococcal Conjugate Vaccine (Prevnar/PCV-13)    11/03/2014  Imm Admin: Pneumococcal Conjugate Vaccine (Prevnar/PCV-13)    06/07/2011  Imm Admin: Pneumococcal Vaccine (UF) - HISTORICAL DATA    06/07/2011  Imm Admin: Pneumococcal polysaccharide vaccine (PPSV-23)      Only the first 5 history entries have been loaded, but more history exists.              Hepatitis A Vaccine (Hep A) (Series Information) Aged Out      No completion history exists for this topic.              Hepatitis B Vaccine (Hep B) (Series Information) Aged Out     No completion history exists for this topic.              HPV Vaccines (Series Information) Aged Out     No completion history exists for this topic.              Polio Vaccine (Inactivated Polio) (Series Information) Aged Out     No completion history exists for this topic.              Meningococcal Immunization (Series Information) Aged Out     No completion history exists for this topic.              Colorectal Cancer Screening  Discontinued        Frequency changed to Never automatically (Topic  "No Longer Applies)    07/16/2018  REFERRAL TO GI FOR COLONOSCOPY    05/22/2015  AMB REFERRAL TO GI FOR COLONOSCOPY                            Patient Care Team:  Donald Pelletier M.D. as PCP - General (Internal Medicine)  Brayden Dobbins M.D. as PCP - [facility] Paneled  Alexandro Ortez M.D. as Consulting Physician (Gastroenterology)  JORDYN Tracey (Inactive) as Consulting Physician (Pulmonary Medicine)  Jake Nolasco M.D. as Consulting Physician (Internal Medicine)  Mookie Jack M.D. (Inactive) as Consulting Physician (Phys Med and Rehab)  Mary Craig, Stephane Ass't as        Financial Resource Strain: Low Risk  (5/6/2025)    Overall Financial Resource Strain (CARDIA)     Difficulty of Paying Living Expenses: Not hard at all      Transportation Needs: No Transportation Needs (5/6/2025)    PRAPARE - Transportation     Lack of Transportation (Medical): No     Lack of Transportation (Non-Medical): No      Food Insecurity: No Food Insecurity (5/6/2025)    Hunger Vital Sign     Worried About Running Out of Food in the Last Year: Never true     Ran Out of Food in the Last Year: Never true        Encounter Vitals  Temperature: 36.6 °C (97.9 °F)  Temp src: Temporal  Blood Pressure : 118/64  Pulse: 69  Pulse Oximetry: 95 %  O2 Delivery Device: CPAP, None - Room Air  Weight: 82.6 kg (182 lb)  Height: 172.7 cm (5' 8\")  BMI (Calculated): 27.67  Pain Score: No pain  DME  O2 Delivery Device: CPAP, None - Room Air     ROS:  No fever, chills, nausea, vomiting, diarrhea, chest pain or shortness of breath. See HPI.    Physical Exam:  Constitutional: NAD  HENMT: NC/AT, OP clear, TM's clear, no lymphadenopathy, no thyromegaly.  No JVD. (-) carotid bruit   Cardiovascular: RRR, No murmur   Lungs: CTAB, bilat   Extremities: 2+ DP, PT and Radial pulses bilaterally. No edema (-) monofilament   Skin: No legions notes  Neurologic: Alert & oriented    Assessment and Plan. The following " treatment and monitoring plan is recommended:  Overweight (BMI 25.0-29.9)  Stable. BMI 27.67 Cont heart healthy diet and regular exercise. Cont f/u with PCP for ongoing monitoring and discussion      Gout  Stable. Rx allopurinol. Gout occurs in R foot. Patient is asymptomatic. [facility] and medication management     Hypertension  Stable. Patient taking HCTZ/. K+ / amlodipine/ benazepril/ labetolol,  BP today  118/64.  HR RRR No BLE edema. [facility] f/[facility] with [facility]CP/ [facility] for ongoing monitoring and medication management     Major depression in remission (HCC)  Stable on Depakote/ Remeron. PHQ 2 - 0 today . No depressive symptoms in the last two months. Patient reports taking medication to calm anger issues. [facility] f/[facility] with [facility]/ [facility] for ongoing discussion and medication management     Benign prostatic hyperplasia with lower urinary tract symptoms  Stable on Terazosin / Symptoms improved with rx. No dizziness. Cont f/u with PCP for ongoing monitoring and medication management      Hyperlipidemia  Stable on atorvastatin. Records review .   Latest Reference Range & Units 07/28/20 09:56   Cholesterol,Tot 100 - 199 mg/dL 129   Triglycerides 0 - 149 mg/dL 240 (H)   HDL >=40 mg/dL 30 !   LDL <100 mg/dL 51   (H): Data is abnormally high  !: Data is abnormal Cont heart healthy diet and regular exercise. Cont f/u with PCP for ongoing monitoring and medication management      History of bladder cancer  Stable Treated for bladder CA 12/2024. Had bladder reconstruction. Has cystoscopy every 3 months. [facility]     Cerebral atrophy (HCC)  Stable. Records review CT Head (11/10/2017) reports Cerebral atrophy.  Patient able to recall 3/3 words/ draw the clock correclty. Patient able to perform all ADLS/ IADLs independently. Cont f/u with PCP for ongoing monitoring.     ALFRED on CPAP  Stable. Patient tolerating CPAP. No issues at this time. No FRANK. [facility] f[facility] with pulmonary [facility]  for ongoing [facility] and management     Alcohol dependence in remission (HCC)  Stable.   [facility]x [facility].  Patient reports stopped drinking approx 10 years ago. Continue abstinence. Cont f/u with [facility]CP [facility] for ongoing [facility]     Exudative age-related macular degeneration of both eyes with active choroidal neovascularization (HCC)  Stable. Patient continues to [facility]. [facility] f/[facility] with [facility] for ongoing monitoring and medication management       Services suggested: No services needed at this time  Health Care Screening: Age-appropriate preventive services recommended by USPTF and ACIP covered by Medicare were discussed today. Services ordered if indicated and agreed upon by the patient.  Referrals offered: Community-based lifestyle interventions to reduce health risks and promote self-management and wellness, fall prevention, nutrition, physical activity, tobacco-use cessation, weight loss, and mental health services as per orders if indicated.    Discussion today about general wellness and lifestyle habits:    Prevent falls and reduce trip hazards; Cautioned about securing or removing rugs.  Have a working fire alarm and carbon monoxide detector.  Engage in regular physical activity and social activities.    Follow-up: Return f/u with PCP as indicated.

## 2025-05-07 PROBLEM — F32.5 MAJOR DEPRESSION IN REMISSION (HCC): Status: ACTIVE | Noted: 2018-05-07

## 2025-07-24 ENCOUNTER — OFFICE VISIT (OUTPATIENT)
Dept: MEDICAL GROUP | Facility: MEDICAL CENTER | Age: 80
End: 2025-07-24
Payer: MEDICARE

## 2025-07-24 VITALS
WEIGHT: 183.8 LBS | OXYGEN SATURATION: 96 % | BODY MASS INDEX: 27.86 KG/M2 | HEIGHT: 68 IN | HEART RATE: 74 BPM | TEMPERATURE: 98 F | RESPIRATION RATE: 16 BRPM | DIASTOLIC BLOOD PRESSURE: 60 MMHG | SYSTOLIC BLOOD PRESSURE: 122 MMHG

## 2025-07-24 DIAGNOSIS — E78.2 MIXED HYPERLIPIDEMIA: ICD-10-CM

## 2025-07-24 DIAGNOSIS — D09.0 CARCINOMA IN SITU OF BLADDER: Primary | ICD-10-CM

## 2025-07-24 DIAGNOSIS — M41.9 SCOLIOSIS, UNSPECIFIED SCOLIOSIS TYPE, UNSPECIFIED SPINAL REGION: ICD-10-CM

## 2025-07-24 DIAGNOSIS — R93.2 ABNORMAL GALL BLADDER DIAGNOSTIC IMAGING: ICD-10-CM

## 2025-07-24 DIAGNOSIS — H35.30 MACULAR DEGENERATION, UNSPECIFIED LATERALITY, UNSPECIFIED TYPE: ICD-10-CM

## 2025-07-24 DIAGNOSIS — G47.33 OSA ON CPAP: ICD-10-CM

## 2025-07-24 DIAGNOSIS — I10 PRIMARY HYPERTENSION: ICD-10-CM

## 2025-07-24 DIAGNOSIS — G25.81 RLS (RESTLESS LEGS SYNDROME): ICD-10-CM

## 2025-07-24 DIAGNOSIS — F32.5 MAJOR DEPRESSION IN REMISSION (HCC): ICD-10-CM

## 2025-07-24 PROBLEM — E83.39 HYPOPHOSPHATEMIA: Status: RESOLVED | Noted: 2023-12-13 | Resolved: 2025-07-24

## 2025-07-24 PROBLEM — Z71.89 ACP (ADVANCE CARE PLANNING): Status: RESOLVED | Noted: 2023-12-11 | Resolved: 2025-07-24

## 2025-07-24 PROCEDURE — 3078F DIAST BP <80 MM HG: CPT | Performed by: STUDENT IN AN ORGANIZED HEALTH CARE EDUCATION/TRAINING PROGRAM

## 2025-07-24 PROCEDURE — 3074F SYST BP LT 130 MM HG: CPT | Performed by: STUDENT IN AN ORGANIZED HEALTH CARE EDUCATION/TRAINING PROGRAM

## 2025-07-24 PROCEDURE — 99214 OFFICE O/P EST MOD 30 MIN: CPT | Performed by: STUDENT IN AN ORGANIZED HEALTH CARE EDUCATION/TRAINING PROGRAM

## 2025-07-24 ASSESSMENT — ENCOUNTER SYMPTOMS
WHEEZING: 0
DIZZINESS: 0
SHORTNESS OF BREATH: 0
NAUSEA: 0
HEADACHES: 0
CHILLS: 0
VOMITING: 0
PALPITATIONS: 0
WEIGHT LOSS: 0
FEVER: 0

## 2025-07-24 ASSESSMENT — FIBROSIS 4 INDEX: FIB4 SCORE: 2.07

## 2025-07-24 NOTE — PROGRESS NOTES
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 [facility]. Follows with ophthalmology frequently for macular degeneration. Overall feels his mood is stable. He walks about 5000 steps with his dog daily.     Health Maintenance:     ROS:  Review of Systems   Constitutional:  Negative for chills, fever and weight loss.   HENT:  Negative for hearing loss.    Respiratory:  Negative for shortness of breath and wheezing.    Cardiovascular:  Negative for chest pain and palpitations.   Gastrointestinal:  Negative for nausea and vomiting.   Genitourinary:  Negative for frequency and urgency.   Skin:  Negative for rash.   Neurological:  Negative for dizziness and headaches.       Objective:     Exam:  /60 (BP Location: Left arm, Patient Position: Sitting, BP Cuff Size: Adult)   Pulse 74   Temp 36.7 °C (98 °F) (Temporal)   Resp 16   Ht 1.727 m (5' 8\") Comment: pt reported.  Wt 83.4 kg (183 lb 12.8 oz)   SpO2 96%   BMI 27.95 kg/m²  Body mass index is 27.95 kg/m².    Physical Exam  Constitutional:       Appearance: Normal appearance.   Cardiovascular:      Rate and Rhythm: Normal rate and regular rhythm.   Pulmonary:      Effort: Pulmonary effort is normal.      Breath sounds: Normal breath sounds.   Musculoskeletal:      " Cervical back: Normal range of motion and neck supple.   Lymphadenopathy:      Cervical: No cervical adenopathy.   Neurological:      Mental Status: He is alert.         Labs:     Assessment & Plan:     79 y.o. male with the following -     Assessment & Plan  1. Bladder cancer.  - [facility] for cystoscopy; all recent cystoscopies have been clear.  - Last cystoscopy noted papillary urothelial carcinoma, which was excised with good margins.  - [facility]; repeat cystoscopy planned.  - Monitoring through regular cystoscopies.    2. Hypertension.  - No changes in symptoms reported.  - Blood pressure management through current medication regimen.  - Advised to maintain a healthy lifestyle, including regular exercise and a balanced diet.  - Monitoring through regular follow-ups.  - tolerating hctz 25mg daily taking without any issue, on benazepril 40mg daily, amlodipine 10mg daily.     3. Hyperlipidemia.  - No changes in symptoms reported.  - Lipid management through current medication regimen.  - Advised to maintain a healthy lifestyle, including regular exercise and a balanced diet.  - Monitoring through regular follow-ups.    4. Sleep apnea.  - Using CPAP machine.  - [facility].  - No changes in symptoms reported.  - Monitoring through regular follow-ups.    5. Restless leg syndrome.  - No changes in symptoms or treatment reported.  - Current management plan remains unchanged.  - Monitoring through regular follow-ups.  - No new interventions required.    6. Gout.  - Following up with rheumatology for gout management.  - No changes in symptoms reported.  - Current management plan remains unchanged.  - Monitoring through regular follow-ups.    7. Scoliosis.  - Following up with pain management for scoliosis-related pain.  - No changes in symptoms reported.  - Current management plan remains unchanged.  - Monitoring through regular  follow-ups.    8. Psychiatric disorder.  - [facility].  - Currently on Depakote and mirtazapine.  - No changes in symptoms reported.  - denies si/ hi   - Monitoring through regular follow-ups.    9. Gallbladder issues.  - Gallstones and thickening of the gallbladder wall noted.  - EUS planned to further evaluate the condition with [facility]  - Appointment with digestive health next month.  - Monitoring through regular follow-ups.    10. Macular degeneration.  - Receiving [facility] in [facility]  - Last injection on 07/14/2025; follow-up scheduled in 6 weeks.  - If no improvement, another series of injections will be considered.  - Monitoring through regular follow-ups.        No follow-ups on file.    Please note that this dictation was created using voice recognition software. I have made every reasonable attempt to correct obvious errors, but I expect that there are errors of grammar and possibly content that I did not discover before finalizing the note.